# Patient Record
Sex: MALE | Race: WHITE | Employment: OTHER | URBAN - METROPOLITAN AREA
[De-identification: names, ages, dates, MRNs, and addresses within clinical notes are randomized per-mention and may not be internally consistent; named-entity substitution may affect disease eponyms.]

---

## 2019-12-06 ENCOUNTER — APPOINTMENT (OUTPATIENT)
Dept: CT IMAGING | Age: 69
DRG: 659 | End: 2019-12-06
Payer: MEDICARE

## 2019-12-06 ENCOUNTER — ANESTHESIA (OUTPATIENT)
Dept: OPERATING ROOM | Age: 69
DRG: 659 | End: 2019-12-06
Payer: MEDICARE

## 2019-12-06 ENCOUNTER — APPOINTMENT (OUTPATIENT)
Dept: GENERAL RADIOLOGY | Age: 69
DRG: 659 | End: 2019-12-06
Payer: MEDICARE

## 2019-12-06 ENCOUNTER — ANESTHESIA EVENT (OUTPATIENT)
Dept: OPERATING ROOM | Age: 69
DRG: 659 | End: 2019-12-06
Payer: MEDICARE

## 2019-12-06 ENCOUNTER — HOSPITAL ENCOUNTER (INPATIENT)
Age: 69
LOS: 9 days | Discharge: HOME | DRG: 659 | End: 2019-12-15
Attending: EMERGENCY MEDICINE | Admitting: FAMILY MEDICINE
Payer: MEDICARE

## 2019-12-06 VITALS
OXYGEN SATURATION: 100 % | TEMPERATURE: 96.1 F | RESPIRATION RATE: 9 BRPM | DIASTOLIC BLOOD PRESSURE: 54 MMHG | SYSTOLIC BLOOD PRESSURE: 85 MMHG

## 2019-12-06 DIAGNOSIS — N17.9 AKI (ACUTE KIDNEY INJURY) (HCC): Primary | ICD-10-CM

## 2019-12-06 DIAGNOSIS — N20.0 KIDNEY STONE: ICD-10-CM

## 2019-12-06 LAB
-: NORMAL
ABSOLUTE EOS #: <0.03 K/UL (ref 0–0.44)
ABSOLUTE IMMATURE GRANULOCYTE: 0.05 K/UL (ref 0–0.3)
ABSOLUTE LYMPH #: 1.07 K/UL (ref 1.1–3.7)
ABSOLUTE MONO #: 0.52 K/UL (ref 0.1–1.2)
ALBUMIN SERPL-MCNC: 4.4 G/DL (ref 3.5–5.2)
ALBUMIN/GLOBULIN RATIO: NORMAL (ref 1–2.5)
ALP BLD-CCNC: 47 U/L (ref 40–129)
ALT SERPL-CCNC: 9 U/L (ref 5–41)
AMORPHOUS: NORMAL
ANION GAP SERPL CALCULATED.3IONS-SCNC: 16 MMOL/L (ref 9–17)
AST SERPL-CCNC: 16 U/L
BACTERIA: NORMAL
BASOPHILS # BLD: 0 % (ref 0–2)
BASOPHILS ABSOLUTE: 0.05 K/UL (ref 0–0.2)
BILIRUB SERPL-MCNC: 0.31 MG/DL (ref 0.3–1.2)
BILIRUBIN DIRECT: <0.08 MG/DL
BILIRUBIN URINE: NEGATIVE
BILIRUBIN, INDIRECT: NORMAL MG/DL (ref 0–1)
BUN BLDV-MCNC: 39 MG/DL (ref 8–23)
BUN/CREAT BLD: 18 (ref 9–20)
CALCIUM SERPL-MCNC: 9.3 MG/DL (ref 8.6–10.4)
CASTS UA: NORMAL /LPF
CHLORIDE BLD-SCNC: 104 MMOL/L (ref 98–107)
CO2: 19 MMOL/L (ref 20–31)
COLOR: ABNORMAL
COMMENT UA: ABNORMAL
CREAT SERPL-MCNC: 2.16 MG/DL (ref 0.7–1.2)
CRYSTALS, UA: NORMAL /HPF
DIFFERENTIAL TYPE: ABNORMAL
EOSINOPHILS RELATIVE PERCENT: 0 % (ref 1–4)
EPITHELIAL CELLS UA: NORMAL /HPF (ref 0–5)
GFR AFRICAN AMERICAN: 37 ML/MIN
GFR NON-AFRICAN AMERICAN: 31 ML/MIN
GFR SERPL CREATININE-BSD FRML MDRD: ABNORMAL ML/MIN/{1.73_M2}
GFR SERPL CREATININE-BSD FRML MDRD: ABNORMAL ML/MIN/{1.73_M2}
GLOBULIN: NORMAL G/DL (ref 1.5–3.8)
GLUCOSE BLD-MCNC: 152 MG/DL (ref 70–99)
GLUCOSE URINE: NEGATIVE
HCT VFR BLD CALC: 34.5 % (ref 40.7–50.3)
HEMOGLOBIN: 10.9 G/DL (ref 13–17)
IMMATURE GRANULOCYTES: 0 %
KETONES, URINE: NEGATIVE
LEUKOCYTE ESTERASE, URINE: NEGATIVE
LYMPHOCYTES # BLD: 9 % (ref 24–43)
MCH RBC QN AUTO: 28.8 PG (ref 25.2–33.5)
MCHC RBC AUTO-ENTMCNC: 31.6 G/DL (ref 28.4–34.8)
MCV RBC AUTO: 91 FL (ref 82.6–102.9)
MONOCYTES # BLD: 5 % (ref 3–12)
MUCUS: NORMAL
NITRITE, URINE: NEGATIVE
NRBC AUTOMATED: 0 PER 100 WBC
OTHER OBSERVATIONS UA: NORMAL
PDW BLD-RTO: 13.7 % (ref 11.8–14.4)
PH UA: 5.5 (ref 5–8)
PLATELET # BLD: 215 K/UL (ref 138–453)
PLATELET ESTIMATE: ABNORMAL
PMV BLD AUTO: 10.9 FL (ref 8.1–13.5)
POTASSIUM SERPL-SCNC: 4.9 MMOL/L (ref 3.7–5.3)
PROTEIN UA: ABNORMAL
RBC # BLD: 3.79 M/UL (ref 4.21–5.77)
RBC # BLD: ABNORMAL 10*6/UL
RBC UA: NORMAL /HPF (ref 0–2)
RENAL EPITHELIAL, UA: NORMAL /HPF
SEG NEUTROPHILS: 86 % (ref 36–65)
SEGMENTED NEUTROPHILS ABSOLUTE COUNT: 9.73 K/UL (ref 1.5–8.1)
SODIUM BLD-SCNC: 139 MMOL/L (ref 135–144)
SPECIFIC GRAVITY UA: 1.01 (ref 1–1.03)
TOTAL PROTEIN: 7.2 G/DL (ref 6.4–8.3)
TRICHOMONAS: NORMAL
TROPONIN INTERP: ABNORMAL
TROPONIN T: ABNORMAL NG/ML
TROPONIN, HIGH SENSITIVITY: 54 NG/L (ref 0–22)
TURBIDITY: CLEAR
URINE HGB: ABNORMAL
UROBILINOGEN, URINE: NORMAL
WBC # BLD: 11.4 K/UL (ref 3.5–11.3)
WBC # BLD: ABNORMAL 10*3/UL
WBC UA: NORMAL /HPF (ref 0–5)
YEAST: NORMAL

## 2019-12-06 PROCEDURE — 2580000003 HC RX 258: Performed by: NURSE PRACTITIONER

## 2019-12-06 PROCEDURE — 2580000003 HC RX 258: Performed by: NURSE ANESTHETIST, CERTIFIED REGISTERED

## 2019-12-06 PROCEDURE — 82365 CALCULUS SPECTROSCOPY: CPT

## 2019-12-06 PROCEDURE — 80048 BASIC METABOLIC PNL TOTAL CA: CPT

## 2019-12-06 PROCEDURE — 0TC68ZZ EXTIRPATION OF MATTER FROM RIGHT URETER, VIA NATURAL OR ARTIFICIAL OPENING ENDOSCOPIC: ICD-10-PCS | Performed by: UROLOGY

## 2019-12-06 PROCEDURE — 81001 URINALYSIS AUTO W/SCOPE: CPT

## 2019-12-06 PROCEDURE — 36415 COLL VENOUS BLD VENIPUNCTURE: CPT

## 2019-12-06 PROCEDURE — 2580000003 HC RX 258: Performed by: FAMILY MEDICINE

## 2019-12-06 PROCEDURE — 2709999900 HC NON-CHARGEABLE SUPPLY: Performed by: UROLOGY

## 2019-12-06 PROCEDURE — 6360000004 HC RX CONTRAST MEDICATION: Performed by: UROLOGY

## 2019-12-06 PROCEDURE — 0T768DZ DILATION OF RIGHT URETER WITH INTRALUMINAL DEVICE, VIA NATURAL OR ARTIFICIAL OPENING ENDOSCOPIC: ICD-10-PCS | Performed by: UROLOGY

## 2019-12-06 PROCEDURE — 3700000000 HC ANESTHESIA ATTENDED CARE: Performed by: UROLOGY

## 2019-12-06 PROCEDURE — 99285 EMERGENCY DEPT VISIT HI MDM: CPT

## 2019-12-06 PROCEDURE — C2617 STENT, NON-COR, TEM W/O DEL: HCPCS | Performed by: UROLOGY

## 2019-12-06 PROCEDURE — 6370000000 HC RX 637 (ALT 250 FOR IP): Performed by: EMERGENCY MEDICINE

## 2019-12-06 PROCEDURE — 2500000003 HC RX 250 WO HCPCS: Performed by: NURSE ANESTHETIST, CERTIFIED REGISTERED

## 2019-12-06 PROCEDURE — 7100000001 HC PACU RECOVERY - ADDTL 15 MIN: Performed by: UROLOGY

## 2019-12-06 PROCEDURE — 2500000003 HC RX 250 WO HCPCS: Performed by: NURSE PRACTITIONER

## 2019-12-06 PROCEDURE — 96374 THER/PROPH/DIAG INJ IV PUSH: CPT

## 2019-12-06 PROCEDURE — 85025 COMPLETE CBC W/AUTO DIFF WBC: CPT

## 2019-12-06 PROCEDURE — 3700000001 HC ADD 15 MINUTES (ANESTHESIA): Performed by: UROLOGY

## 2019-12-06 PROCEDURE — 74176 CT ABD & PELVIS W/O CONTRAST: CPT

## 2019-12-06 PROCEDURE — 1200000000 HC SEMI PRIVATE

## 2019-12-06 PROCEDURE — 6360000002 HC RX W HCPCS: Performed by: NURSE ANESTHETIST, CERTIFIED REGISTERED

## 2019-12-06 PROCEDURE — 6360000002 HC RX W HCPCS: Performed by: ANESTHESIOLOGY

## 2019-12-06 PROCEDURE — 6360000002 HC RX W HCPCS: Performed by: NURSE PRACTITIONER

## 2019-12-06 PROCEDURE — 80076 HEPATIC FUNCTION PANEL: CPT

## 2019-12-06 PROCEDURE — 7100000000 HC PACU RECOVERY - FIRST 15 MIN: Performed by: UROLOGY

## 2019-12-06 PROCEDURE — 84484 ASSAY OF TROPONIN QUANT: CPT

## 2019-12-06 PROCEDURE — A9579 GAD-BASE MR CONTRAST NOS,1ML: HCPCS | Performed by: UROLOGY

## 2019-12-06 PROCEDURE — C1769 GUIDE WIRE: HCPCS | Performed by: UROLOGY

## 2019-12-06 PROCEDURE — 6360000002 HC RX W HCPCS: Performed by: FAMILY MEDICINE

## 2019-12-06 PROCEDURE — 3600000002 HC SURGERY LEVEL 2 BASE: Performed by: UROLOGY

## 2019-12-06 PROCEDURE — BT141ZZ FLUOROSCOPY OF KIDNEYS, URETERS AND BLADDER USING LOW OSMOLAR CONTRAST: ICD-10-PCS | Performed by: UROLOGY

## 2019-12-06 PROCEDURE — 6360000002 HC RX W HCPCS: Performed by: EMERGENCY MEDICINE

## 2019-12-06 PROCEDURE — 6370000000 HC RX 637 (ALT 250 FOR IP): Performed by: FAMILY MEDICINE

## 2019-12-06 PROCEDURE — 6370000000 HC RX 637 (ALT 250 FOR IP): Performed by: UROLOGY

## 2019-12-06 PROCEDURE — 93005 ELECTROCARDIOGRAM TRACING: CPT | Performed by: FAMILY MEDICINE

## 2019-12-06 PROCEDURE — 3209999900 FLUORO FOR SURGICAL PROCEDURES

## 2019-12-06 PROCEDURE — 2500000003 HC RX 250 WO HCPCS: Performed by: ANESTHESIOLOGY

## 2019-12-06 PROCEDURE — 74420 UROGRAPHY RTRGR +-KUB: CPT

## 2019-12-06 PROCEDURE — 96375 TX/PRO/DX INJ NEW DRUG ADDON: CPT

## 2019-12-06 PROCEDURE — 3600000012 HC SURGERY LEVEL 2 ADDTL 15MIN: Performed by: UROLOGY

## 2019-12-06 PROCEDURE — 6360000002 HC RX W HCPCS: Performed by: UROLOGY

## 2019-12-06 DEVICE — URETERAL STENT
Type: IMPLANTABLE DEVICE | Site: URETER | Status: FUNCTIONAL
Brand: POLARIS™ ULTRA

## 2019-12-06 RX ORDER — SODIUM CHLORIDE 0.9 % (FLUSH) 0.9 %
10 SYRINGE (ML) INJECTION EVERY 12 HOURS SCHEDULED
Status: DISCONTINUED | OUTPATIENT
Start: 2019-12-06 | End: 2019-12-06 | Stop reason: SDUPTHER

## 2019-12-06 RX ORDER — HYDROMORPHONE HYDROCHLORIDE 1 MG/ML
0.5 INJECTION, SOLUTION INTRAMUSCULAR; INTRAVENOUS; SUBCUTANEOUS EVERY 5 MIN PRN
Status: DISCONTINUED | OUTPATIENT
Start: 2019-12-06 | End: 2019-12-06 | Stop reason: HOSPADM

## 2019-12-06 RX ORDER — ACETAMINOPHEN 325 MG/1
650 TABLET ORAL EVERY 4 HOURS PRN
Status: DISCONTINUED | OUTPATIENT
Start: 2019-12-06 | End: 2019-12-06 | Stop reason: SDUPTHER

## 2019-12-06 RX ORDER — PROPOFOL 10 MG/ML
INJECTION, EMULSION INTRAVENOUS PRN
Status: DISCONTINUED | OUTPATIENT
Start: 2019-12-06 | End: 2019-12-06 | Stop reason: SDUPTHER

## 2019-12-06 RX ORDER — ONDANSETRON 2 MG/ML
4 INJECTION INTRAMUSCULAR; INTRAVENOUS ONCE
Status: COMPLETED | OUTPATIENT
Start: 2019-12-06 | End: 2019-12-06

## 2019-12-06 RX ORDER — HYDROMORPHONE HYDROCHLORIDE 1 MG/ML
0.5 INJECTION, SOLUTION INTRAMUSCULAR; INTRAVENOUS; SUBCUTANEOUS ONCE
Status: COMPLETED | OUTPATIENT
Start: 2019-12-06 | End: 2019-12-06

## 2019-12-06 RX ORDER — NICOTINE 21 MG/24HR
1 PATCH, TRANSDERMAL 24 HOURS TRANSDERMAL DAILY PRN
Status: DISCONTINUED | OUTPATIENT
Start: 2019-12-06 | End: 2019-12-15 | Stop reason: HOSPADM

## 2019-12-06 RX ORDER — TAMSULOSIN HYDROCHLORIDE 0.4 MG/1
0.4 CAPSULE ORAL ONCE
Status: COMPLETED | OUTPATIENT
Start: 2019-12-06 | End: 2019-12-06

## 2019-12-06 RX ORDER — MORPHINE SULFATE 4 MG/ML
4 INJECTION, SOLUTION INTRAMUSCULAR; INTRAVENOUS ONCE
Status: COMPLETED | OUTPATIENT
Start: 2019-12-06 | End: 2019-12-06

## 2019-12-06 RX ORDER — SODIUM CHLORIDE 9 MG/ML
INJECTION, SOLUTION INTRAVENOUS CONTINUOUS
Status: DISCONTINUED | OUTPATIENT
Start: 2019-12-06 | End: 2019-12-06

## 2019-12-06 RX ORDER — ONDANSETRON 2 MG/ML
INJECTION INTRAMUSCULAR; INTRAVENOUS PRN
Status: DISCONTINUED | OUTPATIENT
Start: 2019-12-06 | End: 2019-12-06 | Stop reason: SDUPTHER

## 2019-12-06 RX ORDER — SODIUM CHLORIDE 0.9 % (FLUSH) 0.9 %
10 SYRINGE (ML) INJECTION PRN
Status: DISCONTINUED | OUTPATIENT
Start: 2019-12-06 | End: 2019-12-15 | Stop reason: HOSPADM

## 2019-12-06 RX ORDER — GLYCOPYRROLATE 1 MG/5 ML
SYRINGE (ML) INTRAVENOUS PRN
Status: DISCONTINUED | OUTPATIENT
Start: 2019-12-06 | End: 2019-12-06 | Stop reason: SDUPTHER

## 2019-12-06 RX ORDER — ONDANSETRON 2 MG/ML
4 INJECTION INTRAMUSCULAR; INTRAVENOUS EVERY 6 HOURS PRN
Status: DISCONTINUED | OUTPATIENT
Start: 2019-12-06 | End: 2019-12-06

## 2019-12-06 RX ORDER — LIDOCAINE HYDROCHLORIDE 20 MG/ML
INJECTION, SOLUTION EPIDURAL; INFILTRATION; INTRACAUDAL; PERINEURAL PRN
Status: DISCONTINUED | OUTPATIENT
Start: 2019-12-06 | End: 2019-12-06 | Stop reason: SDUPTHER

## 2019-12-06 RX ORDER — MAGNESIUM SULFATE 1 G/100ML
1 INJECTION INTRAVENOUS PRN
Status: DISCONTINUED | OUTPATIENT
Start: 2019-12-06 | End: 2019-12-15 | Stop reason: HOSPADM

## 2019-12-06 RX ORDER — SODIUM CHLORIDE 9 MG/ML
INJECTION, SOLUTION INTRAVENOUS CONTINUOUS
Status: DISCONTINUED | OUTPATIENT
Start: 2019-12-06 | End: 2019-12-08

## 2019-12-06 RX ORDER — POTASSIUM CHLORIDE 7.45 MG/ML
10 INJECTION INTRAVENOUS PRN
Status: DISCONTINUED | OUTPATIENT
Start: 2019-12-06 | End: 2019-12-09

## 2019-12-06 RX ORDER — ONDANSETRON 2 MG/ML
4 INJECTION INTRAMUSCULAR; INTRAVENOUS EVERY 6 HOURS PRN
Status: DISCONTINUED | OUTPATIENT
Start: 2019-12-06 | End: 2019-12-15 | Stop reason: HOSPADM

## 2019-12-06 RX ORDER — FENTANYL CITRATE 50 UG/ML
INJECTION, SOLUTION INTRAMUSCULAR; INTRAVENOUS PRN
Status: DISCONTINUED | OUTPATIENT
Start: 2019-12-06 | End: 2019-12-06 | Stop reason: SDUPTHER

## 2019-12-06 RX ORDER — POTASSIUM CHLORIDE 20 MEQ/1
40 TABLET, EXTENDED RELEASE ORAL PRN
Status: DISCONTINUED | OUTPATIENT
Start: 2019-12-06 | End: 2019-12-09

## 2019-12-06 RX ORDER — PHENYLEPHRINE HCL IN 0.9% NACL 1 MG/10 ML
SYRINGE (ML) INTRAVENOUS PRN
Status: DISCONTINUED | OUTPATIENT
Start: 2019-12-06 | End: 2019-12-06 | Stop reason: SDUPTHER

## 2019-12-06 RX ORDER — CEFAZOLIN SODIUM 1 G/3ML
INJECTION, POWDER, FOR SOLUTION INTRAMUSCULAR; INTRAVENOUS PRN
Status: DISCONTINUED | OUTPATIENT
Start: 2019-12-06 | End: 2019-12-06 | Stop reason: SDUPTHER

## 2019-12-06 RX ORDER — METOPROLOL TARTRATE 5 MG/5ML
5 INJECTION INTRAVENOUS EVERY 6 HOURS
Status: DISCONTINUED | OUTPATIENT
Start: 2019-12-06 | End: 2019-12-07

## 2019-12-06 RX ORDER — MORPHINE SULFATE 15 MG/1
15 TABLET, FILM COATED, EXTENDED RELEASE ORAL 2 TIMES DAILY PRN
Refills: 0 | COMMUNITY
Start: 2019-11-07 | End: 2020-02-17

## 2019-12-06 RX ORDER — SODIUM CHLORIDE, SODIUM LACTATE, POTASSIUM CHLORIDE, CALCIUM CHLORIDE 600; 310; 30; 20 MG/100ML; MG/100ML; MG/100ML; MG/100ML
INJECTION, SOLUTION INTRAVENOUS CONTINUOUS PRN
Status: DISCONTINUED | OUTPATIENT
Start: 2019-12-06 | End: 2019-12-06 | Stop reason: SDUPTHER

## 2019-12-06 RX ORDER — LIDOCAINE HYDROCHLORIDE 20 MG/ML
JELLY TOPICAL PRN
Status: DISCONTINUED | OUTPATIENT
Start: 2019-12-06 | End: 2019-12-06 | Stop reason: ALTCHOICE

## 2019-12-06 RX ORDER — TOBRAMYCIN SULFATE 40 MG/ML
INJECTION, SOLUTION INTRAMUSCULAR; INTRAVENOUS PRN
Status: DISCONTINUED | OUTPATIENT
Start: 2019-12-06 | End: 2019-12-06 | Stop reason: ALTCHOICE

## 2019-12-06 RX ORDER — ACETAMINOPHEN 325 MG/1
650 TABLET ORAL EVERY 4 HOURS PRN
Status: DISCONTINUED | OUTPATIENT
Start: 2019-12-06 | End: 2019-12-15 | Stop reason: HOSPADM

## 2019-12-06 RX ORDER — MORPHINE SULFATE 15 MG/1
15 TABLET ORAL EVERY 4 HOURS PRN
COMMUNITY
End: 2019-12-06 | Stop reason: CLARIF

## 2019-12-06 RX ORDER — SODIUM CHLORIDE 0.9 % (FLUSH) 0.9 %
10 SYRINGE (ML) INJECTION EVERY 12 HOURS SCHEDULED
Status: DISCONTINUED | OUTPATIENT
Start: 2019-12-06 | End: 2019-12-15 | Stop reason: HOSPADM

## 2019-12-06 RX ORDER — MORPHINE SULFATE 15 MG/1
15 TABLET, FILM COATED, EXTENDED RELEASE ORAL 2 TIMES DAILY
Status: DISCONTINUED | OUTPATIENT
Start: 2019-12-06 | End: 2019-12-15 | Stop reason: HOSPADM

## 2019-12-06 RX ORDER — SODIUM CHLORIDE 0.9 % (FLUSH) 0.9 %
10 SYRINGE (ML) INJECTION PRN
Status: DISCONTINUED | OUTPATIENT
Start: 2019-12-06 | End: 2019-12-06 | Stop reason: SDUPTHER

## 2019-12-06 RX ORDER — ONDANSETRON 4 MG/1
4 TABLET, ORALLY DISINTEGRATING ORAL EVERY 6 HOURS PRN
Status: DISCONTINUED | OUTPATIENT
Start: 2019-12-06 | End: 2019-12-15 | Stop reason: HOSPADM

## 2019-12-06 RX ORDER — MORPHINE SULFATE 2 MG/ML
1 INJECTION, SOLUTION INTRAMUSCULAR; INTRAVENOUS EVERY 4 HOURS PRN
Status: DISCONTINUED | OUTPATIENT
Start: 2019-12-06 | End: 2019-12-08

## 2019-12-06 RX ORDER — FAMOTIDINE 20 MG/1
20 TABLET, FILM COATED ORAL 2 TIMES DAILY
Status: DISCONTINUED | OUTPATIENT
Start: 2019-12-06 | End: 2019-12-06

## 2019-12-06 RX ORDER — FENTANYL CITRATE 50 UG/ML
25 INJECTION, SOLUTION INTRAMUSCULAR; INTRAVENOUS EVERY 5 MIN PRN
Status: DISCONTINUED | OUTPATIENT
Start: 2019-12-06 | End: 2019-12-06 | Stop reason: HOSPADM

## 2019-12-06 RX ORDER — ONDANSETRON 2 MG/ML
4 INJECTION INTRAMUSCULAR; INTRAVENOUS
Status: DISCONTINUED | OUTPATIENT
Start: 2019-12-06 | End: 2019-12-06 | Stop reason: HOSPADM

## 2019-12-06 RX ORDER — FAMOTIDINE 20 MG/1
20 TABLET, FILM COATED ORAL NIGHTLY
Status: DISCONTINUED | OUTPATIENT
Start: 2019-12-06 | End: 2019-12-15 | Stop reason: HOSPADM

## 2019-12-06 RX ORDER — FENTANYL CITRATE 50 UG/ML
50 INJECTION, SOLUTION INTRAMUSCULAR; INTRAVENOUS EVERY 5 MIN PRN
Status: DISCONTINUED | OUTPATIENT
Start: 2019-12-06 | End: 2019-12-06 | Stop reason: HOSPADM

## 2019-12-06 RX ORDER — HYDROMORPHONE HYDROCHLORIDE 1 MG/ML
0.25 INJECTION, SOLUTION INTRAMUSCULAR; INTRAVENOUS; SUBCUTANEOUS EVERY 5 MIN PRN
Status: DISCONTINUED | OUTPATIENT
Start: 2019-12-06 | End: 2019-12-06 | Stop reason: HOSPADM

## 2019-12-06 RX ADMIN — HYDROMORPHONE HYDROCHLORIDE 0.5 MG: 1 INJECTION, SOLUTION INTRAMUSCULAR; INTRAVENOUS; SUBCUTANEOUS at 14:35

## 2019-12-06 RX ADMIN — CEFAZOLIN 2000 MG: 1 INJECTION, POWDER, FOR SOLUTION INTRAMUSCULAR; INTRAVENOUS at 18:42

## 2019-12-06 RX ADMIN — PROPOFOL 200 MG: 10 INJECTION, EMULSION INTRAVENOUS at 18:23

## 2019-12-06 RX ADMIN — FENTANYL CITRATE 50 MCG: 50 INJECTION, SOLUTION INTRAMUSCULAR; INTRAVENOUS at 19:49

## 2019-12-06 RX ADMIN — ONDANSETRON 4 MG: 2 INJECTION, SOLUTION INTRAMUSCULAR; INTRAVENOUS at 18:45

## 2019-12-06 RX ADMIN — SODIUM CHLORIDE: 9 INJECTION, SOLUTION INTRAVENOUS at 14:05

## 2019-12-06 RX ADMIN — MORPHINE SULFATE 15 MG: 15 TABLET, FILM COATED, EXTENDED RELEASE ORAL at 22:42

## 2019-12-06 RX ADMIN — Medication 10 ML: at 22:43

## 2019-12-06 RX ADMIN — LIDOCAINE HYDROCHLORIDE 60 MG: 20 INJECTION, SOLUTION EPIDURAL; INFILTRATION; INTRACAUDAL; PERINEURAL at 18:23

## 2019-12-06 RX ADMIN — Medication 100 MCG: at 18:23

## 2019-12-06 RX ADMIN — Medication 200 MCG: at 18:41

## 2019-12-06 RX ADMIN — FAMOTIDINE 20 MG: 20 TABLET ORAL at 22:42

## 2019-12-06 RX ADMIN — FENTANYL CITRATE 50 MCG: 50 INJECTION, SOLUTION INTRAMUSCULAR; INTRAVENOUS at 19:54

## 2019-12-06 RX ADMIN — METOPROLOL TARTRATE 5 MG: 5 INJECTION INTRAVENOUS at 22:42

## 2019-12-06 RX ADMIN — TAMSULOSIN HYDROCHLORIDE 0.4 MG: 0.4 CAPSULE ORAL at 15:24

## 2019-12-06 RX ADMIN — ONDANSETRON 4 MG: 2 INJECTION INTRAMUSCULAR; INTRAVENOUS at 14:06

## 2019-12-06 RX ADMIN — HYDROMORPHONE HYDROCHLORIDE 0.5 MG: 1 INJECTION, SOLUTION INTRAMUSCULAR; INTRAVENOUS; SUBCUTANEOUS at 14:06

## 2019-12-06 RX ADMIN — Medication 0.2 MG: at 18:41

## 2019-12-06 RX ADMIN — MORPHINE SULFATE 1 MG: 2 INJECTION, SOLUTION INTRAMUSCULAR; INTRAVENOUS at 20:52

## 2019-12-06 RX ADMIN — MORPHINE SULFATE 4 MG: 4 INJECTION INTRAVENOUS at 15:32

## 2019-12-06 RX ADMIN — SODIUM CHLORIDE, POTASSIUM CHLORIDE, SODIUM LACTATE AND CALCIUM CHLORIDE: 600; 310; 30; 20 INJECTION, SOLUTION INTRAVENOUS at 18:18

## 2019-12-06 RX ADMIN — Medication 100 MCG: at 18:46

## 2019-12-06 ASSESSMENT — PAIN DESCRIPTION - ONSET: ONSET: PROGRESSIVE

## 2019-12-06 ASSESSMENT — PAIN - FUNCTIONAL ASSESSMENT: PAIN_FUNCTIONAL_ASSESSMENT: ACTIVITIES ARE NOT PREVENTED

## 2019-12-06 ASSESSMENT — PULMONARY FUNCTION TESTS
PIF_VALUE: 0
PIF_VALUE: 14
PIF_VALUE: 0
PIF_VALUE: 3
PIF_VALUE: 17
PIF_VALUE: 13
PIF_VALUE: 6
PIF_VALUE: 16
PIF_VALUE: 13
PIF_VALUE: 14
PIF_VALUE: 15
PIF_VALUE: 1
PIF_VALUE: 14
PIF_VALUE: 13
PIF_VALUE: 6
PIF_VALUE: 12
PIF_VALUE: 14
PIF_VALUE: 15
PIF_VALUE: 1
PIF_VALUE: 0
PIF_VALUE: 13
PIF_VALUE: 14
PIF_VALUE: 15
PIF_VALUE: 14
PIF_VALUE: 1
PIF_VALUE: 12
PIF_VALUE: 14
PIF_VALUE: 12
PIF_VALUE: 14
PIF_VALUE: 13
PIF_VALUE: 12
PIF_VALUE: 15
PIF_VALUE: 13
PIF_VALUE: 14
PIF_VALUE: 6
PIF_VALUE: 14

## 2019-12-06 ASSESSMENT — ENCOUNTER SYMPTOMS
DIARRHEA: 0
SHORTNESS OF BREATH: 0
VOMITING: 1
COUGH: 0
NAUSEA: 1
BACK PAIN: 1
COLOR CHANGE: 0
ABDOMINAL PAIN: 1

## 2019-12-06 ASSESSMENT — PAIN DESCRIPTION - ORIENTATION
ORIENTATION: MID
ORIENTATION: RIGHT

## 2019-12-06 ASSESSMENT — PAIN DESCRIPTION - PAIN TYPE
TYPE: ACUTE PAIN
TYPE: ACUTE PAIN

## 2019-12-06 ASSESSMENT — PAIN DESCRIPTION - DESCRIPTORS
DESCRIPTORS: SHARP
DESCRIPTORS: SHARP;SHOOTING;STABBING
DESCRIPTORS: PRESSURE;SHARP
DESCRIPTORS: SHARP

## 2019-12-06 ASSESSMENT — PAIN DESCRIPTION - LOCATION
LOCATION: STERNUM
LOCATION: PENIS
LOCATION: FLANK

## 2019-12-06 ASSESSMENT — PAIN DESCRIPTION - FREQUENCY
FREQUENCY: INTERMITTENT
FREQUENCY: INTERMITTENT

## 2019-12-06 ASSESSMENT — PAIN SCALES - GENERAL
PAINLEVEL_OUTOF10: 3
PAINLEVEL_OUTOF10: 8
PAINLEVEL_OUTOF10: 10
PAINLEVEL_OUTOF10: 0
PAINLEVEL_OUTOF10: 10
PAINLEVEL_OUTOF10: 10
PAINLEVEL_OUTOF10: 8
PAINLEVEL_OUTOF10: 9
PAINLEVEL_OUTOF10: 7
PAINLEVEL_OUTOF10: 9
PAINLEVEL_OUTOF10: 0

## 2019-12-06 ASSESSMENT — PAIN DESCRIPTION - PROGRESSION: CLINICAL_PROGRESSION: NOT CHANGED

## 2019-12-07 LAB
ABSOLUTE EOS #: <0.03 K/UL (ref 0–0.44)
ABSOLUTE IMMATURE GRANULOCYTE: 0.02 K/UL (ref 0–0.3)
ABSOLUTE LYMPH #: 1.63 K/UL (ref 1.1–3.7)
ABSOLUTE MONO #: 0.81 K/UL (ref 0.1–1.2)
ANION GAP SERPL CALCULATED.3IONS-SCNC: 12 MMOL/L (ref 9–17)
ANION GAP SERPL CALCULATED.3IONS-SCNC: 12 MMOL/L (ref 9–17)
BASOPHILS # BLD: 1 % (ref 0–2)
BASOPHILS ABSOLUTE: 0.04 K/UL (ref 0–0.2)
BUN BLDV-MCNC: 34 MG/DL (ref 8–23)
BUN BLDV-MCNC: 39 MG/DL (ref 8–23)
BUN/CREAT BLD: 16 (ref 9–20)
BUN/CREAT BLD: 18 (ref 9–20)
CALCIUM SERPL-MCNC: 8.5 MG/DL (ref 8.6–10.4)
CALCIUM SERPL-MCNC: 8.9 MG/DL (ref 8.6–10.4)
CHLORIDE BLD-SCNC: 106 MMOL/L (ref 98–107)
CHLORIDE BLD-SCNC: 110 MMOL/L (ref 98–107)
CO2: 19 MMOL/L (ref 20–31)
CO2: 19 MMOL/L (ref 20–31)
CREAT SERPL-MCNC: 2.17 MG/DL (ref 0.7–1.2)
CREAT SERPL-MCNC: 2.22 MG/DL (ref 0.7–1.2)
DIFFERENTIAL TYPE: ABNORMAL
EKG ATRIAL RATE: 99 BPM
EKG P AXIS: 68 DEGREES
EKG P-R INTERVAL: 170 MS
EKG Q-T INTERVAL: 348 MS
EKG QRS DURATION: 84 MS
EKG QTC CALCULATION (BAZETT): 446 MS
EKG R AXIS: 52 DEGREES
EKG T AXIS: 101 DEGREES
EKG VENTRICULAR RATE: 99 BPM
EOSINOPHILS RELATIVE PERCENT: 0 % (ref 1–4)
GFR AFRICAN AMERICAN: 36 ML/MIN
GFR AFRICAN AMERICAN: 37 ML/MIN
GFR NON-AFRICAN AMERICAN: 30 ML/MIN
GFR NON-AFRICAN AMERICAN: 30 ML/MIN
GFR SERPL CREATININE-BSD FRML MDRD: ABNORMAL ML/MIN/{1.73_M2}
GLUCOSE BLD-MCNC: 129 MG/DL (ref 70–99)
GLUCOSE BLD-MCNC: 145 MG/DL (ref 70–99)
HCT VFR BLD CALC: 29.7 % (ref 40.7–50.3)
HCT VFR BLD CALC: 32.1 % (ref 40.7–50.3)
HEMOGLOBIN: 10.1 G/DL (ref 13–17)
HEMOGLOBIN: 9.3 G/DL (ref 13–17)
IMMATURE GRANULOCYTES: 0 %
INR BLD: 1
LYMPHOCYTES # BLD: 19 % (ref 24–43)
MCH RBC QN AUTO: 28.4 PG (ref 25.2–33.5)
MCH RBC QN AUTO: 28.9 PG (ref 25.2–33.5)
MCHC RBC AUTO-ENTMCNC: 31.3 G/DL (ref 28.4–34.8)
MCHC RBC AUTO-ENTMCNC: 31.5 G/DL (ref 28.4–34.8)
MCV RBC AUTO: 90.8 FL (ref 82.6–102.9)
MCV RBC AUTO: 91.7 FL (ref 82.6–102.9)
MONOCYTES # BLD: 9 % (ref 3–12)
NRBC AUTOMATED: 0 PER 100 WBC
NRBC AUTOMATED: 0 PER 100 WBC
PARTIAL THROMBOPLASTIN TIME: 25.5 SEC (ref 23–31)
PARTIAL THROMBOPLASTIN TIME: 33.8 SEC (ref 23–31)
PDW BLD-RTO: 14 % (ref 11.8–14.4)
PDW BLD-RTO: 14 % (ref 11.8–14.4)
PLATELET # BLD: 182 K/UL (ref 138–453)
PLATELET # BLD: 188 K/UL (ref 138–453)
PLATELET ESTIMATE: ABNORMAL
PMV BLD AUTO: 10.2 FL (ref 8.1–13.5)
PMV BLD AUTO: 10.3 FL (ref 8.1–13.5)
POTASSIUM SERPL-SCNC: 5.1 MMOL/L (ref 3.7–5.3)
POTASSIUM SERPL-SCNC: 5.4 MMOL/L (ref 3.7–5.3)
PROTHROMBIN TIME: 10.3 SEC (ref 9.7–11.6)
RBC # BLD: 3.27 M/UL (ref 4.21–5.77)
RBC # BLD: 3.5 M/UL (ref 4.21–5.77)
RBC # BLD: ABNORMAL 10*6/UL
SEG NEUTROPHILS: 71 % (ref 36–65)
SEGMENTED NEUTROPHILS ABSOLUTE COUNT: 6.23 K/UL (ref 1.5–8.1)
SODIUM BLD-SCNC: 137 MMOL/L (ref 135–144)
SODIUM BLD-SCNC: 141 MMOL/L (ref 135–144)
TROPONIN INTERP: ABNORMAL
TROPONIN T: ABNORMAL NG/ML
TROPONIN, HIGH SENSITIVITY: 100 NG/L (ref 0–22)
TROPONIN, HIGH SENSITIVITY: 125 NG/L (ref 0–22)
TROPONIN, HIGH SENSITIVITY: 88 NG/L (ref 0–22)
TROPONIN, HIGH SENSITIVITY: 97 NG/L (ref 0–22)
WBC # BLD: 8.8 K/UL (ref 3.5–11.3)
WBC # BLD: 9 K/UL (ref 3.5–11.3)
WBC # BLD: ABNORMAL 10*3/UL

## 2019-12-07 PROCEDURE — 85610 PROTHROMBIN TIME: CPT

## 2019-12-07 PROCEDURE — 97535 SELF CARE MNGMENT TRAINING: CPT

## 2019-12-07 PROCEDURE — 97530 THERAPEUTIC ACTIVITIES: CPT

## 2019-12-07 PROCEDURE — 6360000002 HC RX W HCPCS: Performed by: INTERNAL MEDICINE

## 2019-12-07 PROCEDURE — 6360000002 HC RX W HCPCS: Performed by: UROLOGY

## 2019-12-07 PROCEDURE — 97116 GAIT TRAINING THERAPY: CPT

## 2019-12-07 PROCEDURE — 6360000002 HC RX W HCPCS: Performed by: FAMILY MEDICINE

## 2019-12-07 PROCEDURE — 2500000003 HC RX 250 WO HCPCS: Performed by: NURSE PRACTITIONER

## 2019-12-07 PROCEDURE — 6370000000 HC RX 637 (ALT 250 FOR IP): Performed by: INTERNAL MEDICINE

## 2019-12-07 PROCEDURE — 6370000000 HC RX 637 (ALT 250 FOR IP): Performed by: FAMILY MEDICINE

## 2019-12-07 PROCEDURE — 85025 COMPLETE CBC W/AUTO DIFF WBC: CPT

## 2019-12-07 PROCEDURE — 2580000003 HC RX 258: Performed by: UROLOGY

## 2019-12-07 PROCEDURE — 6370000000 HC RX 637 (ALT 250 FOR IP): Performed by: NURSE PRACTITIONER

## 2019-12-07 PROCEDURE — 6370000000 HC RX 637 (ALT 250 FOR IP): Performed by: UROLOGY

## 2019-12-07 PROCEDURE — 80048 BASIC METABOLIC PNL TOTAL CA: CPT

## 2019-12-07 PROCEDURE — 85027 COMPLETE CBC AUTOMATED: CPT

## 2019-12-07 PROCEDURE — 93005 ELECTROCARDIOGRAM TRACING: CPT | Performed by: INTERNAL MEDICINE

## 2019-12-07 PROCEDURE — 2060000000 HC ICU INTERMEDIATE R&B

## 2019-12-07 PROCEDURE — 93306 TTE W/DOPPLER COMPLETE: CPT

## 2019-12-07 PROCEDURE — 51798 US URINE CAPACITY MEASURE: CPT

## 2019-12-07 PROCEDURE — 97162 PT EVAL MOD COMPLEX 30 MIN: CPT

## 2019-12-07 PROCEDURE — 84484 ASSAY OF TROPONIN QUANT: CPT

## 2019-12-07 PROCEDURE — 2580000003 HC RX 258: Performed by: FAMILY MEDICINE

## 2019-12-07 PROCEDURE — 6360000002 HC RX W HCPCS: Performed by: NURSE PRACTITIONER

## 2019-12-07 PROCEDURE — 85730 THROMBOPLASTIN TIME PARTIAL: CPT

## 2019-12-07 PROCEDURE — 97166 OT EVAL MOD COMPLEX 45 MIN: CPT

## 2019-12-07 PROCEDURE — 36415 COLL VENOUS BLD VENIPUNCTURE: CPT

## 2019-12-07 RX ORDER — OXYBUTYNIN CHLORIDE 5 MG/1
5 TABLET ORAL 3 TIMES DAILY
Status: DISCONTINUED | OUTPATIENT
Start: 2019-12-07 | End: 2019-12-15 | Stop reason: HOSPADM

## 2019-12-07 RX ORDER — ASPIRIN 81 MG/1
81 TABLET, CHEWABLE ORAL DAILY
Status: DISCONTINUED | OUTPATIENT
Start: 2019-12-08 | End: 2019-12-15 | Stop reason: HOSPADM

## 2019-12-07 RX ORDER — HEPARIN SODIUM 1000 [USP'U]/ML
4000 INJECTION, SOLUTION INTRAVENOUS; SUBCUTANEOUS ONCE
Status: COMPLETED | OUTPATIENT
Start: 2019-12-07 | End: 2019-12-07

## 2019-12-07 RX ORDER — ATORVASTATIN CALCIUM 20 MG/1
20 TABLET, FILM COATED ORAL NIGHTLY
Status: DISCONTINUED | OUTPATIENT
Start: 2019-12-07 | End: 2019-12-15 | Stop reason: HOSPADM

## 2019-12-07 RX ORDER — SODIUM POLYSTYRENE SULFONATE 15 G/60ML
15 SUSPENSION ORAL; RECTAL ONCE
Status: COMPLETED | OUTPATIENT
Start: 2019-12-07 | End: 2019-12-07

## 2019-12-07 RX ORDER — TAMSULOSIN HYDROCHLORIDE 0.4 MG/1
0.4 CAPSULE ORAL DAILY
Status: DISCONTINUED | OUTPATIENT
Start: 2019-12-07 | End: 2019-12-15 | Stop reason: HOSPADM

## 2019-12-07 RX ORDER — HYDRALAZINE HYDROCHLORIDE 20 MG/ML
5 INJECTION INTRAMUSCULAR; INTRAVENOUS EVERY 6 HOURS PRN
Status: DISCONTINUED | OUTPATIENT
Start: 2019-12-07 | End: 2019-12-15 | Stop reason: HOSPADM

## 2019-12-07 RX ORDER — BISACODYL 10 MG
10 SUPPOSITORY, RECTAL RECTAL ONCE
Status: COMPLETED | OUTPATIENT
Start: 2019-12-07 | End: 2019-12-07

## 2019-12-07 RX ORDER — LIDOCAINE HYDROCHLORIDE 20 MG/ML
JELLY TOPICAL PRN
Status: DISCONTINUED | OUTPATIENT
Start: 2019-12-07 | End: 2019-12-15 | Stop reason: HOSPADM

## 2019-12-07 RX ORDER — HEPARIN SODIUM 1000 [USP'U]/ML
4000 INJECTION, SOLUTION INTRAVENOUS; SUBCUTANEOUS PRN
Status: DISCONTINUED | OUTPATIENT
Start: 2019-12-07 | End: 2019-12-09

## 2019-12-07 RX ORDER — HEPARIN SODIUM 10000 [USP'U]/100ML
12 INJECTION, SOLUTION INTRAVENOUS CONTINUOUS
Status: DISCONTINUED | OUTPATIENT
Start: 2019-12-07 | End: 2019-12-09

## 2019-12-07 RX ORDER — HEPARIN SODIUM 1000 [USP'U]/ML
2000 INJECTION, SOLUTION INTRAVENOUS; SUBCUTANEOUS PRN
Status: DISCONTINUED | OUTPATIENT
Start: 2019-12-07 | End: 2019-12-09

## 2019-12-07 RX ADMIN — Medication 10 ML: at 09:12

## 2019-12-07 RX ADMIN — BISACODYL 10 MG: 10 SUPPOSITORY RECTAL at 12:16

## 2019-12-07 RX ADMIN — ONDANSETRON 4 MG: 2 INJECTION INTRAMUSCULAR; INTRAVENOUS at 19:44

## 2019-12-07 RX ADMIN — HEPARIN SODIUM 4000 UNITS: 1000 INJECTION INTRAVENOUS; SUBCUTANEOUS at 16:00

## 2019-12-07 RX ADMIN — CEFAZOLIN 500 MG: 1 INJECTION, POWDER, FOR SOLUTION INTRAMUSCULAR; INTRAVENOUS at 18:52

## 2019-12-07 RX ADMIN — HYDRALAZINE HYDROCHLORIDE 5 MG: 20 INJECTION INTRAMUSCULAR; INTRAVENOUS at 21:22

## 2019-12-07 RX ADMIN — OXYBUTYNIN CHLORIDE 5 MG: 5 TABLET ORAL at 13:24

## 2019-12-07 RX ADMIN — ACETAMINOPHEN 650 MG: 325 TABLET ORAL at 16:53

## 2019-12-07 RX ADMIN — ATORVASTATIN CALCIUM 20 MG: 20 TABLET, FILM COATED ORAL at 19:54

## 2019-12-07 RX ADMIN — OXYBUTYNIN CHLORIDE 5 MG: 5 TABLET ORAL at 19:54

## 2019-12-07 RX ADMIN — HEPARIN SODIUM AND DEXTROSE 14 UNITS/KG/HR: 10000; 5 INJECTION INTRAVENOUS at 22:06

## 2019-12-07 RX ADMIN — MORPHINE SULFATE 1 MG: 2 INJECTION, SOLUTION INTRAMUSCULAR; INTRAVENOUS at 19:43

## 2019-12-07 RX ADMIN — CEFAZOLIN 500 MG: 1 INJECTION, POWDER, FOR SOLUTION INTRAMUSCULAR; INTRAVENOUS at 11:32

## 2019-12-07 RX ADMIN — SODIUM CHLORIDE: 9 INJECTION, SOLUTION INTRAVENOUS at 03:32

## 2019-12-07 RX ADMIN — CEFAZOLIN 500 MG: 1 INJECTION, POWDER, FOR SOLUTION INTRAMUSCULAR; INTRAVENOUS at 03:32

## 2019-12-07 RX ADMIN — METOPROLOL TARTRATE 5 MG: 5 INJECTION INTRAVENOUS at 03:51

## 2019-12-07 RX ADMIN — FAMOTIDINE 20 MG: 20 TABLET ORAL at 19:54

## 2019-12-07 RX ADMIN — SODIUM POLYSTYRENE SULFONATE 15 G: 15 SUSPENSION ORAL; RECTAL at 09:11

## 2019-12-07 RX ADMIN — MORPHINE SULFATE 1 MG: 2 INJECTION, SOLUTION INTRAMUSCULAR; INTRAVENOUS at 05:16

## 2019-12-07 RX ADMIN — ASPIRIN 325 MG: 325 TABLET, DELAYED RELEASE ORAL at 16:00

## 2019-12-07 RX ADMIN — HEPARIN SODIUM 2000 UNITS: 1000 INJECTION INTRAVENOUS; SUBCUTANEOUS at 22:06

## 2019-12-07 RX ADMIN — Medication 10 ML: at 03:51

## 2019-12-07 RX ADMIN — TAMSULOSIN HYDROCHLORIDE 0.4 MG: 0.4 CAPSULE ORAL at 16:00

## 2019-12-07 RX ADMIN — MORPHINE SULFATE 15 MG: 15 TABLET, FILM COATED, EXTENDED RELEASE ORAL at 22:27

## 2019-12-07 RX ADMIN — HEPARIN SODIUM AND DEXTROSE 12 UNITS/KG/HR: 10000; 5 INJECTION INTRAVENOUS at 16:09

## 2019-12-07 RX ADMIN — LIDOCAINE HYDROCHLORIDE: 20 JELLY TOPICAL at 13:33

## 2019-12-07 RX ADMIN — Medication 10 ML: at 05:16

## 2019-12-07 RX ADMIN — METOPROLOL TARTRATE 5 MG: 5 INJECTION INTRAVENOUS at 11:32

## 2019-12-07 RX ADMIN — METOPROLOL TARTRATE 25 MG: 25 TABLET ORAL at 19:54

## 2019-12-07 ASSESSMENT — PAIN DESCRIPTION - PAIN TYPE
TYPE: SURGICAL PAIN;ACUTE PAIN
TYPE: ACUTE PAIN

## 2019-12-07 ASSESSMENT — PAIN DESCRIPTION - ONSET
ONSET: ON-GOING
ONSET: ON-GOING

## 2019-12-07 ASSESSMENT — PAIN SCALES - GENERAL
PAINLEVEL_OUTOF10: 7
PAINLEVEL_OUTOF10: 8
PAINLEVEL_OUTOF10: 2
PAINLEVEL_OUTOF10: 6
PAINLEVEL_OUTOF10: 8
PAINLEVEL_OUTOF10: 3
PAINLEVEL_OUTOF10: 8
PAINLEVEL_OUTOF10: 6
PAINLEVEL_OUTOF10: 8

## 2019-12-07 ASSESSMENT — PAIN DESCRIPTION - PROGRESSION: CLINICAL_PROGRESSION: GRADUALLY WORSENING

## 2019-12-07 ASSESSMENT — PAIN DESCRIPTION - FREQUENCY
FREQUENCY: INTERMITTENT
FREQUENCY: INTERMITTENT

## 2019-12-07 ASSESSMENT — PAIN DESCRIPTION - ORIENTATION
ORIENTATION: LOWER
ORIENTATION: RIGHT

## 2019-12-07 ASSESSMENT — PAIN - FUNCTIONAL ASSESSMENT
PAIN_FUNCTIONAL_ASSESSMENT: ACTIVITIES ARE NOT PREVENTED
PAIN_FUNCTIONAL_ASSESSMENT: PREVENTS OR INTERFERES SOME ACTIVE ACTIVITIES AND ADLS

## 2019-12-07 ASSESSMENT — PAIN DESCRIPTION - LOCATION
LOCATION: ABDOMEN;FLANK
LOCATION: ABDOMEN;FLANK
LOCATION: ABDOMEN
LOCATION: ABDOMEN;FLANK
LOCATION: ABDOMEN;FLANK

## 2019-12-07 ASSESSMENT — PAIN DESCRIPTION - DESCRIPTORS
DESCRIPTORS: ACHING;PRESSURE
DESCRIPTORS: PRESSURE;SHARP

## 2019-12-08 ENCOUNTER — APPOINTMENT (OUTPATIENT)
Dept: CT IMAGING | Age: 69
DRG: 659 | End: 2019-12-08
Payer: MEDICARE

## 2019-12-08 LAB
ABSOLUTE EOS #: <0.03 K/UL (ref 0–0.44)
ABSOLUTE IMMATURE GRANULOCYTE: 0.03 K/UL (ref 0–0.3)
ABSOLUTE LYMPH #: 1.47 K/UL (ref 1.1–3.7)
ABSOLUTE MONO #: 0.71 K/UL (ref 0.1–1.2)
ANION GAP SERPL CALCULATED.3IONS-SCNC: 13 MMOL/L (ref 9–17)
BASOPHILS # BLD: 0 % (ref 0–2)
BASOPHILS ABSOLUTE: 0.03 K/UL (ref 0–0.2)
BUN BLDV-MCNC: 33 MG/DL (ref 8–23)
BUN/CREAT BLD: 15 (ref 9–20)
CALCIUM SERPL-MCNC: 8.6 MG/DL (ref 8.6–10.4)
CHLORIDE BLD-SCNC: 109 MMOL/L (ref 98–107)
CO2: 18 MMOL/L (ref 20–31)
CREAT SERPL-MCNC: 2.19 MG/DL (ref 0.7–1.2)
DIFFERENTIAL TYPE: ABNORMAL
EOSINOPHILS RELATIVE PERCENT: 0 % (ref 1–4)
GFR AFRICAN AMERICAN: 36 ML/MIN
GFR NON-AFRICAN AMERICAN: 30 ML/MIN
GFR SERPL CREATININE-BSD FRML MDRD: ABNORMAL ML/MIN/{1.73_M2}
GFR SERPL CREATININE-BSD FRML MDRD: ABNORMAL ML/MIN/{1.73_M2}
GLUCOSE BLD-MCNC: 152 MG/DL (ref 70–99)
HCT VFR BLD CALC: 30.9 % (ref 40.7–50.3)
HEMOGLOBIN: 9.7 G/DL (ref 13–17)
IMMATURE GRANULOCYTES: 0 %
LYMPHOCYTES # BLD: 15 % (ref 24–43)
MCH RBC QN AUTO: 28.6 PG (ref 25.2–33.5)
MCHC RBC AUTO-ENTMCNC: 31.4 G/DL (ref 28.4–34.8)
MCV RBC AUTO: 91.2 FL (ref 82.6–102.9)
MONOCYTES # BLD: 7 % (ref 3–12)
NRBC AUTOMATED: 0 PER 100 WBC
PARTIAL THROMBOPLASTIN TIME: 25.9 SEC (ref 23–31)
PARTIAL THROMBOPLASTIN TIME: 67.1 SEC (ref 23–31)
PARTIAL THROMBOPLASTIN TIME: 67.6 SEC (ref 23–31)
PARTIAL THROMBOPLASTIN TIME: 74.8 SEC (ref 23–31)
PARTIAL THROMBOPLASTIN TIME: 86.5 SEC (ref 23–31)
PDW BLD-RTO: 13.9 % (ref 11.8–14.4)
PLATELET # BLD: 188 K/UL (ref 138–453)
PLATELET ESTIMATE: ABNORMAL
PMV BLD AUTO: 11.1 FL (ref 8.1–13.5)
POTASSIUM SERPL-SCNC: 4.6 MMOL/L (ref 3.7–5.3)
RBC # BLD: 3.39 M/UL (ref 4.21–5.77)
RBC # BLD: ABNORMAL 10*6/UL
SEG NEUTROPHILS: 78 % (ref 36–65)
SEGMENTED NEUTROPHILS ABSOLUTE COUNT: 7.48 K/UL (ref 1.5–8.1)
SODIUM BLD-SCNC: 140 MMOL/L (ref 135–144)
TROPONIN INTERP: ABNORMAL
TROPONIN INTERP: ABNORMAL
TROPONIN T: ABNORMAL NG/ML
TROPONIN T: ABNORMAL NG/ML
TROPONIN, HIGH SENSITIVITY: 118 NG/L (ref 0–22)
TROPONIN, HIGH SENSITIVITY: 144 NG/L (ref 0–22)
WBC # BLD: 9.7 K/UL (ref 3.5–11.3)
WBC # BLD: ABNORMAL 10*3/UL

## 2019-12-08 PROCEDURE — 6360000002 HC RX W HCPCS: Performed by: FAMILY MEDICINE

## 2019-12-08 PROCEDURE — 6370000000 HC RX 637 (ALT 250 FOR IP): Performed by: NURSE PRACTITIONER

## 2019-12-08 PROCEDURE — 6360000002 HC RX W HCPCS: Performed by: INTERNAL MEDICINE

## 2019-12-08 PROCEDURE — 6370000000 HC RX 637 (ALT 250 FOR IP): Performed by: FAMILY MEDICINE

## 2019-12-08 PROCEDURE — 6370000000 HC RX 637 (ALT 250 FOR IP): Performed by: UROLOGY

## 2019-12-08 PROCEDURE — 74176 CT ABD & PELVIS W/O CONTRAST: CPT

## 2019-12-08 PROCEDURE — 2060000000 HC ICU INTERMEDIATE R&B

## 2019-12-08 PROCEDURE — 85730 THROMBOPLASTIN TIME PARTIAL: CPT

## 2019-12-08 PROCEDURE — 6360000002 HC RX W HCPCS: Performed by: UROLOGY

## 2019-12-08 PROCEDURE — 85025 COMPLETE CBC W/AUTO DIFF WBC: CPT

## 2019-12-08 PROCEDURE — 84484 ASSAY OF TROPONIN QUANT: CPT

## 2019-12-08 PROCEDURE — 2580000003 HC RX 258: Performed by: UROLOGY

## 2019-12-08 PROCEDURE — 6370000000 HC RX 637 (ALT 250 FOR IP): Performed by: INTERNAL MEDICINE

## 2019-12-08 PROCEDURE — 80048 BASIC METABOLIC PNL TOTAL CA: CPT

## 2019-12-08 PROCEDURE — 36415 COLL VENOUS BLD VENIPUNCTURE: CPT

## 2019-12-08 RX ORDER — HYDROMORPHONE HYDROCHLORIDE 1 MG/ML
1 INJECTION, SOLUTION INTRAMUSCULAR; INTRAVENOUS; SUBCUTANEOUS EVERY 4 HOURS PRN
Status: DISCONTINUED | OUTPATIENT
Start: 2019-12-08 | End: 2019-12-15 | Stop reason: HOSPADM

## 2019-12-08 RX ORDER — SODIUM BICARBONATE 650 MG/1
650 TABLET ORAL 2 TIMES DAILY
Status: DISCONTINUED | OUTPATIENT
Start: 2019-12-08 | End: 2019-12-15 | Stop reason: HOSPADM

## 2019-12-08 RX ORDER — AMLODIPINE BESYLATE 10 MG/1
10 TABLET ORAL DAILY
Status: DISCONTINUED | OUTPATIENT
Start: 2019-12-08 | End: 2019-12-15 | Stop reason: HOSPADM

## 2019-12-08 RX ORDER — METOPROLOL TARTRATE 50 MG/1
50 TABLET, FILM COATED ORAL 2 TIMES DAILY
Status: DISCONTINUED | OUTPATIENT
Start: 2019-12-08 | End: 2019-12-15 | Stop reason: HOSPADM

## 2019-12-08 RX ADMIN — OXYBUTYNIN CHLORIDE 5 MG: 5 TABLET ORAL at 21:42

## 2019-12-08 RX ADMIN — HEPARIN SODIUM 4000 UNITS: 1000 INJECTION INTRAVENOUS; SUBCUTANEOUS at 05:46

## 2019-12-08 RX ADMIN — FAMOTIDINE 20 MG: 20 TABLET ORAL at 21:41

## 2019-12-08 RX ADMIN — ONDANSETRON 4 MG: 2 INJECTION INTRAMUSCULAR; INTRAVENOUS at 07:19

## 2019-12-08 RX ADMIN — ATORVASTATIN CALCIUM 20 MG: 20 TABLET, FILM COATED ORAL at 21:42

## 2019-12-08 RX ADMIN — OXYBUTYNIN CHLORIDE 5 MG: 5 TABLET ORAL at 09:29

## 2019-12-08 RX ADMIN — HYDROMORPHONE HYDROCHLORIDE 1 MG: 1 INJECTION, SOLUTION INTRAMUSCULAR; INTRAVENOUS; SUBCUTANEOUS at 11:29

## 2019-12-08 RX ADMIN — ASPIRIN 81 MG 81 MG: 81 TABLET ORAL at 09:29

## 2019-12-08 RX ADMIN — OXYBUTYNIN CHLORIDE 5 MG: 5 TABLET ORAL at 15:06

## 2019-12-08 RX ADMIN — CEFAZOLIN 500 MG: 1 INJECTION, POWDER, FOR SOLUTION INTRAMUSCULAR; INTRAVENOUS at 21:44

## 2019-12-08 RX ADMIN — MORPHINE SULFATE 15 MG: 15 TABLET, FILM COATED, EXTENDED RELEASE ORAL at 21:42

## 2019-12-08 RX ADMIN — HEPARIN SODIUM AND DEXTROSE 18 UNITS/KG/HR: 10000; 5 INJECTION INTRAVENOUS at 05:46

## 2019-12-08 RX ADMIN — METOPROLOL TARTRATE 50 MG: 50 TABLET, FILM COATED ORAL at 21:42

## 2019-12-08 RX ADMIN — MORPHINE SULFATE 1 MG: 2 INJECTION, SOLUTION INTRAMUSCULAR; INTRAVENOUS at 02:18

## 2019-12-08 RX ADMIN — CEFAZOLIN 500 MG: 1 INJECTION, POWDER, FOR SOLUTION INTRAMUSCULAR; INTRAVENOUS at 09:29

## 2019-12-08 RX ADMIN — METOPROLOL TARTRATE 25 MG: 25 TABLET ORAL at 09:29

## 2019-12-08 RX ADMIN — SODIUM BICARBONATE 650 MG: 650 TABLET ORAL at 21:41

## 2019-12-08 RX ADMIN — MORPHINE SULFATE 1 MG: 2 INJECTION, SOLUTION INTRAMUSCULAR; INTRAVENOUS at 07:16

## 2019-12-08 RX ADMIN — CEFAZOLIN 500 MG: 1 INJECTION, POWDER, FOR SOLUTION INTRAMUSCULAR; INTRAVENOUS at 02:18

## 2019-12-08 RX ADMIN — MORPHINE SULFATE 15 MG: 15 TABLET, FILM COATED, EXTENDED RELEASE ORAL at 09:29

## 2019-12-08 RX ADMIN — AMLODIPINE BESYLATE 10 MG: 10 TABLET ORAL at 12:21

## 2019-12-08 RX ADMIN — TAMSULOSIN HYDROCHLORIDE 0.4 MG: 0.4 CAPSULE ORAL at 09:28

## 2019-12-08 ASSESSMENT — PAIN SCALES - GENERAL
PAINLEVEL_OUTOF10: 10
PAINLEVEL_OUTOF10: 6
PAINLEVEL_OUTOF10: 0
PAINLEVEL_OUTOF10: 10
PAINLEVEL_OUTOF10: 10
PAINLEVEL_OUTOF10: 0
PAINLEVEL_OUTOF10: 3
PAINLEVEL_OUTOF10: 0
PAINLEVEL_OUTOF10: 0
PAINLEVEL_OUTOF10: 10
PAINLEVEL_OUTOF10: 9
PAINLEVEL_OUTOF10: 10
PAINLEVEL_OUTOF10: 4
PAINLEVEL_OUTOF10: 4

## 2019-12-08 ASSESSMENT — PAIN DESCRIPTION - PAIN TYPE
TYPE: ACUTE PAIN;SURGICAL PAIN
TYPE: SURGICAL PAIN
TYPE: ACUTE PAIN;SURGICAL PAIN

## 2019-12-08 ASSESSMENT — PAIN DESCRIPTION - LOCATION
LOCATION: BACK
LOCATION: FLANK

## 2019-12-08 ASSESSMENT — PAIN DESCRIPTION - ORIENTATION
ORIENTATION: LOWER;RIGHT
ORIENTATION: LOWER;RIGHT

## 2019-12-09 LAB
ABSOLUTE EOS #: <0.03 K/UL (ref 0–0.44)
ABSOLUTE IMMATURE GRANULOCYTE: 0.05 K/UL (ref 0–0.3)
ABSOLUTE LYMPH #: 1.48 K/UL (ref 1.1–3.7)
ABSOLUTE MONO #: 1.09 K/UL (ref 0.1–1.2)
ANION GAP SERPL CALCULATED.3IONS-SCNC: 12 MMOL/L (ref 9–17)
BASOPHILS # BLD: 0 % (ref 0–2)
BASOPHILS ABSOLUTE: 0.03 K/UL (ref 0–0.2)
BUN BLDV-MCNC: 36 MG/DL (ref 8–23)
BUN/CREAT BLD: 16 (ref 9–20)
CALCIUM SERPL-MCNC: 8.8 MG/DL (ref 8.6–10.4)
CHLORIDE BLD-SCNC: 110 MMOL/L (ref 98–107)
CO2: 18 MMOL/L (ref 20–31)
CREAT SERPL-MCNC: 2.32 MG/DL (ref 0.7–1.2)
DIFFERENTIAL TYPE: ABNORMAL
EKG ATRIAL RATE: 90 BPM
EKG P-R INTERVAL: 166 MS
EKG Q-T INTERVAL: 360 MS
EKG QRS DURATION: 98 MS
EKG QTC CALCULATION (BAZETT): 440 MS
EKG R AXIS: 138 DEGREES
EKG T AXIS: 155 DEGREES
EKG VENTRICULAR RATE: 90 BPM
EOSINOPHILS RELATIVE PERCENT: 0 % (ref 1–4)
GFR AFRICAN AMERICAN: 34 ML/MIN
GFR NON-AFRICAN AMERICAN: 28 ML/MIN
GFR SERPL CREATININE-BSD FRML MDRD: ABNORMAL ML/MIN/{1.73_M2}
GFR SERPL CREATININE-BSD FRML MDRD: ABNORMAL ML/MIN/{1.73_M2}
GLUCOSE BLD-MCNC: 149 MG/DL (ref 70–99)
HCT VFR BLD CALC: 28.8 % (ref 40.7–50.3)
HEMOGLOBIN: 9.1 G/DL (ref 13–17)
IMMATURE GRANULOCYTES: 1 %
LYMPHOCYTES # BLD: 15 % (ref 24–43)
MCH RBC QN AUTO: 28.8 PG (ref 25.2–33.5)
MCHC RBC AUTO-ENTMCNC: 31.6 G/DL (ref 28.4–34.8)
MCV RBC AUTO: 91.1 FL (ref 82.6–102.9)
MONOCYTES # BLD: 11 % (ref 3–12)
NRBC AUTOMATED: 0 PER 100 WBC
PDW BLD-RTO: 13.8 % (ref 11.8–14.4)
PLATELET # BLD: 154 K/UL (ref 138–453)
PLATELET ESTIMATE: ABNORMAL
PMV BLD AUTO: 11.4 FL (ref 8.1–13.5)
POTASSIUM SERPL-SCNC: 4.4 MMOL/L (ref 3.7–5.3)
RBC # BLD: 3.16 M/UL (ref 4.21–5.77)
RBC # BLD: ABNORMAL 10*6/UL
SEG NEUTROPHILS: 73 % (ref 36–65)
SEGMENTED NEUTROPHILS ABSOLUTE COUNT: 7.44 K/UL (ref 1.5–8.1)
SODIUM BLD-SCNC: 140 MMOL/L (ref 135–144)
TROPONIN INTERP: ABNORMAL
TROPONIN INTERP: ABNORMAL
TROPONIN T: ABNORMAL NG/ML
TROPONIN T: ABNORMAL NG/ML
TROPONIN, HIGH SENSITIVITY: 135 NG/L (ref 0–22)
TROPONIN, HIGH SENSITIVITY: 139 NG/L (ref 0–22)
WBC # BLD: 10.1 K/UL (ref 3.5–11.3)
WBC # BLD: ABNORMAL 10*3/UL

## 2019-12-09 PROCEDURE — 2060000000 HC ICU INTERMEDIATE R&B

## 2019-12-09 PROCEDURE — 81001 URINALYSIS AUTO W/SCOPE: CPT

## 2019-12-09 PROCEDURE — 84300 ASSAY OF URINE SODIUM: CPT

## 2019-12-09 PROCEDURE — 2580000003 HC RX 258: Performed by: UROLOGY

## 2019-12-09 PROCEDURE — 84484 ASSAY OF TROPONIN QUANT: CPT

## 2019-12-09 PROCEDURE — 82570 ASSAY OF URINE CREATININE: CPT

## 2019-12-09 PROCEDURE — 87205 SMEAR GRAM STAIN: CPT

## 2019-12-09 PROCEDURE — 93010 ELECTROCARDIOGRAM REPORT: CPT | Performed by: INTERNAL MEDICINE

## 2019-12-09 PROCEDURE — 6370000000 HC RX 637 (ALT 250 FOR IP): Performed by: NURSE PRACTITIONER

## 2019-12-09 PROCEDURE — 6370000000 HC RX 637 (ALT 250 FOR IP): Performed by: INTERNAL MEDICINE

## 2019-12-09 PROCEDURE — 6360000002 HC RX W HCPCS: Performed by: INTERNAL MEDICINE

## 2019-12-09 PROCEDURE — 82043 UR ALBUMIN QUANTITATIVE: CPT

## 2019-12-09 PROCEDURE — 80048 BASIC METABOLIC PNL TOTAL CA: CPT

## 2019-12-09 PROCEDURE — 97535 SELF CARE MNGMENT TRAINING: CPT

## 2019-12-09 PROCEDURE — 6360000002 HC RX W HCPCS: Performed by: UROLOGY

## 2019-12-09 PROCEDURE — 36415 COLL VENOUS BLD VENIPUNCTURE: CPT

## 2019-12-09 PROCEDURE — 6370000000 HC RX 637 (ALT 250 FOR IP): Performed by: UROLOGY

## 2019-12-09 PROCEDURE — 6370000000 HC RX 637 (ALT 250 FOR IP): Performed by: FAMILY MEDICINE

## 2019-12-09 PROCEDURE — 2580000003 HC RX 258: Performed by: FAMILY MEDICINE

## 2019-12-09 PROCEDURE — 85025 COMPLETE CBC W/AUTO DIFF WBC: CPT

## 2019-12-09 RX ORDER — HEPARIN SODIUM 5000 [USP'U]/ML
5000 INJECTION, SOLUTION INTRAVENOUS; SUBCUTANEOUS EVERY 8 HOURS SCHEDULED
Status: DISCONTINUED | OUTPATIENT
Start: 2019-12-09 | End: 2019-12-15 | Stop reason: HOSPADM

## 2019-12-09 RX ADMIN — Medication 10 ML: at 19:41

## 2019-12-09 RX ADMIN — CEFAZOLIN 500 MG: 1 INJECTION, POWDER, FOR SOLUTION INTRAMUSCULAR; INTRAVENOUS at 04:05

## 2019-12-09 RX ADMIN — FAMOTIDINE 20 MG: 20 TABLET ORAL at 21:07

## 2019-12-09 RX ADMIN — ATORVASTATIN CALCIUM 20 MG: 20 TABLET, FILM COATED ORAL at 21:07

## 2019-12-09 RX ADMIN — OXYBUTYNIN CHLORIDE 5 MG: 5 TABLET ORAL at 21:07

## 2019-12-09 RX ADMIN — SODIUM BICARBONATE 650 MG: 650 TABLET ORAL at 08:05

## 2019-12-09 RX ADMIN — ASPIRIN 81 MG 81 MG: 81 TABLET ORAL at 08:05

## 2019-12-09 RX ADMIN — ACETAMINOPHEN 650 MG: 325 TABLET ORAL at 04:05

## 2019-12-09 RX ADMIN — AMLODIPINE BESYLATE 10 MG: 10 TABLET ORAL at 08:05

## 2019-12-09 RX ADMIN — METOPROLOL TARTRATE 50 MG: 50 TABLET, FILM COATED ORAL at 21:07

## 2019-12-09 RX ADMIN — METOPROLOL TARTRATE 50 MG: 50 TABLET, FILM COATED ORAL at 08:05

## 2019-12-09 RX ADMIN — HEPARIN SODIUM 5000 UNITS: 5000 INJECTION INTRAVENOUS; SUBCUTANEOUS at 21:07

## 2019-12-09 RX ADMIN — MORPHINE SULFATE 15 MG: 15 TABLET, FILM COATED, EXTENDED RELEASE ORAL at 08:05

## 2019-12-09 RX ADMIN — TAMSULOSIN HYDROCHLORIDE 0.4 MG: 0.4 CAPSULE ORAL at 08:05

## 2019-12-09 RX ADMIN — HEPARIN SODIUM 5000 UNITS: 5000 INJECTION INTRAVENOUS; SUBCUTANEOUS at 14:39

## 2019-12-09 RX ADMIN — OXYBUTYNIN CHLORIDE 5 MG: 5 TABLET ORAL at 14:39

## 2019-12-09 RX ADMIN — SODIUM BICARBONATE 650 MG: 650 TABLET ORAL at 21:07

## 2019-12-09 RX ADMIN — CEFAZOLIN 500 MG: 1 INJECTION, POWDER, FOR SOLUTION INTRAMUSCULAR; INTRAVENOUS at 10:10

## 2019-12-09 RX ADMIN — OXYBUTYNIN CHLORIDE 5 MG: 5 TABLET ORAL at 08:05

## 2019-12-09 RX ADMIN — CEFAZOLIN 500 MG: 1 INJECTION, POWDER, FOR SOLUTION INTRAMUSCULAR; INTRAVENOUS at 19:41

## 2019-12-09 ASSESSMENT — PAIN DESCRIPTION - ONSET: ONSET: ON-GOING

## 2019-12-09 ASSESSMENT — PAIN SCALES - GENERAL
PAINLEVEL_OUTOF10: 0
PAINLEVEL_OUTOF10: 0
PAINLEVEL_OUTOF10: 3
PAINLEVEL_OUTOF10: 2
PAINLEVEL_OUTOF10: 0
PAINLEVEL_OUTOF10: 3
PAINLEVEL_OUTOF10: 5

## 2019-12-09 ASSESSMENT — PAIN DESCRIPTION - FREQUENCY: FREQUENCY: INTERMITTENT

## 2019-12-09 ASSESSMENT — PAIN DESCRIPTION - PAIN TYPE: TYPE: ACUTE PAIN

## 2019-12-09 ASSESSMENT — PAIN DESCRIPTION - LOCATION: LOCATION: HEAD

## 2019-12-09 ASSESSMENT — PAIN - FUNCTIONAL ASSESSMENT: PAIN_FUNCTIONAL_ASSESSMENT: ACTIVITIES ARE NOT PREVENTED

## 2019-12-09 ASSESSMENT — PAIN DESCRIPTION - PROGRESSION: CLINICAL_PROGRESSION: GRADUALLY WORSENING

## 2019-12-09 ASSESSMENT — PAIN DESCRIPTION - DESCRIPTORS: DESCRIPTORS: HEADACHE

## 2019-12-10 LAB
-: ABNORMAL
ABSOLUTE EOS #: 0.03 K/UL (ref 0–0.44)
ABSOLUTE IMMATURE GRANULOCYTE: 0.03 K/UL (ref 0–0.3)
ABSOLUTE LYMPH #: 1.68 K/UL (ref 1.1–3.7)
ABSOLUTE MONO #: 0.96 K/UL (ref 0.1–1.2)
AMORPHOUS: ABNORMAL
ANION GAP SERPL CALCULATED.3IONS-SCNC: 12 MMOL/L (ref 9–17)
BACTERIA: ABNORMAL
BASOPHILS # BLD: 1 % (ref 0–2)
BASOPHILS ABSOLUTE: 0.05 K/UL (ref 0–0.2)
BILIRUBIN URINE: NEGATIVE
BUN BLDV-MCNC: 38 MG/DL (ref 8–23)
BUN/CREAT BLD: 17 (ref 9–20)
CALCIUM SERPL-MCNC: 9 MG/DL (ref 8.6–10.4)
CASTS UA: ABNORMAL /LPF
CASTS UA: ABNORMAL /LPF
CHLORIDE BLD-SCNC: 107 MMOL/L (ref 98–107)
CO2: 20 MMOL/L (ref 20–31)
COLOR: YELLOW
COMMENT UA: ABNORMAL
CREAT SERPL-MCNC: 2.24 MG/DL (ref 0.7–1.2)
CREATININE URINE: 152.6 MG/DL (ref 39–259)
CRYSTALS, UA: ABNORMAL /HPF
DIFFERENTIAL TYPE: ABNORMAL
EOSINOPHIL,URINE: NORMAL
EOSINOPHILS RELATIVE PERCENT: 0 % (ref 1–4)
EPITHELIAL CELLS UA: ABNORMAL /HPF (ref 0–5)
GFR AFRICAN AMERICAN: 36 ML/MIN
GFR NON-AFRICAN AMERICAN: 29 ML/MIN
GFR SERPL CREATININE-BSD FRML MDRD: ABNORMAL ML/MIN/{1.73_M2}
GFR SERPL CREATININE-BSD FRML MDRD: ABNORMAL ML/MIN/{1.73_M2}
GLUCOSE BLD-MCNC: 119 MG/DL (ref 70–99)
GLUCOSE URINE: NEGATIVE
HCT VFR BLD CALC: 28.9 % (ref 40.7–50.3)
HEMOGLOBIN: 8.9 G/DL (ref 13–17)
IMMATURE GRANULOCYTES: 0 %
KETONES, URINE: NEGATIVE
LEUKOCYTE ESTERASE, URINE: NEGATIVE
LYMPHOCYTES # BLD: 19 % (ref 24–43)
MAGNESIUM: 1.7 MG/DL (ref 1.6–2.6)
MCH RBC QN AUTO: 28.3 PG (ref 25.2–33.5)
MCHC RBC AUTO-ENTMCNC: 30.8 G/DL (ref 28.4–34.8)
MCV RBC AUTO: 92 FL (ref 82.6–102.9)
MICROALBUMIN/CREAT 24H UR: 1361 MG/L
MICROALBUMIN/CREAT UR-RTO: 892 MCG/MG CREAT
MONOCYTES # BLD: 11 % (ref 3–12)
MUCUS: ABNORMAL
NITRITE, URINE: NEGATIVE
NRBC AUTOMATED: 0 PER 100 WBC
OTHER OBSERVATIONS UA: ABNORMAL
PDW BLD-RTO: 13.6 % (ref 11.8–14.4)
PH UA: 5 (ref 5–8)
PHOSPHORUS: 3.4 MG/DL (ref 2.5–4.5)
PLATELET # BLD: 155 K/UL (ref 138–453)
PLATELET ESTIMATE: ABNORMAL
PMV BLD AUTO: 11.7 FL (ref 8.1–13.5)
POTASSIUM SERPL-SCNC: 4.2 MMOL/L (ref 3.7–5.3)
PROTEIN UA: ABNORMAL
RBC # BLD: 3.14 M/UL (ref 4.21–5.77)
RBC # BLD: ABNORMAL 10*6/UL
RBC UA: ABNORMAL /HPF (ref 0–2)
RENAL EPITHELIAL, UA: ABNORMAL /HPF
SEG NEUTROPHILS: 69 % (ref 36–65)
SEGMENTED NEUTROPHILS ABSOLUTE COUNT: 6.16 K/UL (ref 1.5–8.1)
SODIUM BLD-SCNC: 139 MMOL/L (ref 135–144)
SODIUM,UR: 49 MMOL/L
SPECIFIC GRAVITY UA: 1.02 (ref 1–1.03)
TRICHOMONAS: ABNORMAL
TROPONIN INTERP: ABNORMAL
TROPONIN INTERP: ABNORMAL
TROPONIN T: ABNORMAL NG/ML
TROPONIN T: ABNORMAL NG/ML
TROPONIN, HIGH SENSITIVITY: 128 NG/L (ref 0–22)
TROPONIN, HIGH SENSITIVITY: 142 NG/L (ref 0–22)
TURBIDITY: ABNORMAL
URINE HGB: ABNORMAL
UROBILINOGEN, URINE: NORMAL
WBC # BLD: 8.9 K/UL (ref 3.5–11.3)
WBC # BLD: ABNORMAL 10*3/UL
WBC UA: ABNORMAL /HPF (ref 0–5)
YEAST: ABNORMAL

## 2019-12-10 PROCEDURE — 6370000000 HC RX 637 (ALT 250 FOR IP): Performed by: FAMILY MEDICINE

## 2019-12-10 PROCEDURE — 6360000002 HC RX W HCPCS: Performed by: INTERNAL MEDICINE

## 2019-12-10 PROCEDURE — 2580000003 HC RX 258: Performed by: FAMILY MEDICINE

## 2019-12-10 PROCEDURE — 6370000000 HC RX 637 (ALT 250 FOR IP): Performed by: NURSE PRACTITIONER

## 2019-12-10 PROCEDURE — 2580000003 HC RX 258: Performed by: UROLOGY

## 2019-12-10 PROCEDURE — 6370000000 HC RX 637 (ALT 250 FOR IP): Performed by: UROLOGY

## 2019-12-10 PROCEDURE — 6370000000 HC RX 637 (ALT 250 FOR IP): Performed by: INTERNAL MEDICINE

## 2019-12-10 PROCEDURE — 6360000002 HC RX W HCPCS: Performed by: UROLOGY

## 2019-12-10 PROCEDURE — 84100 ASSAY OF PHOSPHORUS: CPT

## 2019-12-10 PROCEDURE — 2060000000 HC ICU INTERMEDIATE R&B

## 2019-12-10 PROCEDURE — 80048 BASIC METABOLIC PNL TOTAL CA: CPT

## 2019-12-10 PROCEDURE — 84484 ASSAY OF TROPONIN QUANT: CPT

## 2019-12-10 PROCEDURE — 85025 COMPLETE CBC W/AUTO DIFF WBC: CPT

## 2019-12-10 PROCEDURE — 36415 COLL VENOUS BLD VENIPUNCTURE: CPT

## 2019-12-10 PROCEDURE — 83735 ASSAY OF MAGNESIUM: CPT

## 2019-12-10 RX ADMIN — CEFAZOLIN 500 MG: 1 INJECTION, POWDER, FOR SOLUTION INTRAMUSCULAR; INTRAVENOUS at 11:20

## 2019-12-10 RX ADMIN — ATORVASTATIN CALCIUM 20 MG: 20 TABLET, FILM COATED ORAL at 21:26

## 2019-12-10 RX ADMIN — FAMOTIDINE 20 MG: 20 TABLET ORAL at 21:26

## 2019-12-10 RX ADMIN — Medication 10 ML: at 11:20

## 2019-12-10 RX ADMIN — CEFAZOLIN SODIUM 500 MG: 500 INJECTION, POWDER, FOR SOLUTION INTRAMUSCULAR; INTRAVENOUS at 21:26

## 2019-12-10 RX ADMIN — HEPARIN SODIUM 5000 UNITS: 5000 INJECTION INTRAVENOUS; SUBCUTANEOUS at 15:04

## 2019-12-10 RX ADMIN — HEPARIN SODIUM 5000 UNITS: 5000 INJECTION INTRAVENOUS; SUBCUTANEOUS at 06:32

## 2019-12-10 RX ADMIN — METOPROLOL TARTRATE 50 MG: 50 TABLET, FILM COATED ORAL at 09:05

## 2019-12-10 RX ADMIN — CEFAZOLIN 500 MG: 1 INJECTION, POWDER, FOR SOLUTION INTRAMUSCULAR; INTRAVENOUS at 02:38

## 2019-12-10 RX ADMIN — SODIUM BICARBONATE 650 MG: 650 TABLET ORAL at 21:26

## 2019-12-10 RX ADMIN — METOPROLOL TARTRATE 50 MG: 50 TABLET, FILM COATED ORAL at 21:26

## 2019-12-10 RX ADMIN — OXYBUTYNIN CHLORIDE 5 MG: 5 TABLET ORAL at 15:04

## 2019-12-10 RX ADMIN — SODIUM BICARBONATE 650 MG: 650 TABLET ORAL at 09:05

## 2019-12-10 RX ADMIN — AMLODIPINE BESYLATE 10 MG: 10 TABLET ORAL at 09:05

## 2019-12-10 RX ADMIN — ACETAMINOPHEN 650 MG: 325 TABLET ORAL at 02:38

## 2019-12-10 RX ADMIN — OXYBUTYNIN CHLORIDE 5 MG: 5 TABLET ORAL at 21:26

## 2019-12-10 RX ADMIN — MORPHINE SULFATE 15 MG: 15 TABLET, FILM COATED, EXTENDED RELEASE ORAL at 09:05

## 2019-12-10 RX ADMIN — TAMSULOSIN HYDROCHLORIDE 0.4 MG: 0.4 CAPSULE ORAL at 09:05

## 2019-12-10 RX ADMIN — HEPARIN SODIUM 5000 UNITS: 5000 INJECTION INTRAVENOUS; SUBCUTANEOUS at 21:26

## 2019-12-10 RX ADMIN — ASPIRIN 81 MG 81 MG: 81 TABLET ORAL at 09:05

## 2019-12-10 RX ADMIN — OXYBUTYNIN CHLORIDE 5 MG: 5 TABLET ORAL at 09:05

## 2019-12-10 ASSESSMENT — PAIN DESCRIPTION - LOCATION: LOCATION: HEAD

## 2019-12-10 ASSESSMENT — PAIN DESCRIPTION - FREQUENCY: FREQUENCY: INTERMITTENT

## 2019-12-10 ASSESSMENT — PAIN SCALES - GENERAL
PAINLEVEL_OUTOF10: 5
PAINLEVEL_OUTOF10: 0
PAINLEVEL_OUTOF10: 3
PAINLEVEL_OUTOF10: 3
PAINLEVEL_OUTOF10: 4

## 2019-12-10 ASSESSMENT — PAIN DESCRIPTION - ONSET: ONSET: ON-GOING

## 2019-12-10 ASSESSMENT — PAIN DESCRIPTION - DESCRIPTORS: DESCRIPTORS: HEADACHE

## 2019-12-10 ASSESSMENT — PAIN DESCRIPTION - PROGRESSION: CLINICAL_PROGRESSION: NOT CHANGED

## 2019-12-10 ASSESSMENT — PAIN - FUNCTIONAL ASSESSMENT: PAIN_FUNCTIONAL_ASSESSMENT: ACTIVITIES ARE NOT PREVENTED

## 2019-12-10 ASSESSMENT — PAIN DESCRIPTION - PAIN TYPE: TYPE: ACUTE PAIN

## 2019-12-11 LAB
ABSOLUTE EOS #: 0.17 K/UL (ref 0–0.44)
ABSOLUTE IMMATURE GRANULOCYTE: 0.03 K/UL (ref 0–0.3)
ABSOLUTE LYMPH #: 1.37 K/UL (ref 1.1–3.7)
ABSOLUTE MONO #: 0.86 K/UL (ref 0.1–1.2)
ANION GAP SERPL CALCULATED.3IONS-SCNC: 12 MMOL/L (ref 9–17)
BASOPHILS # BLD: 1 % (ref 0–2)
BASOPHILS ABSOLUTE: 0.04 K/UL (ref 0–0.2)
BUN BLDV-MCNC: 41 MG/DL (ref 8–23)
BUN/CREAT BLD: 19 (ref 9–20)
CALCIUM SERPL-MCNC: 9.1 MG/DL (ref 8.6–10.4)
CHLORIDE BLD-SCNC: 107 MMOL/L (ref 98–107)
CO2: 21 MMOL/L (ref 20–31)
CREAT SERPL-MCNC: 2.2 MG/DL (ref 0.7–1.2)
DIFFERENTIAL TYPE: ABNORMAL
EOSINOPHILS RELATIVE PERCENT: 2 % (ref 1–4)
GFR AFRICAN AMERICAN: 36 ML/MIN
GFR NON-AFRICAN AMERICAN: 30 ML/MIN
GFR SERPL CREATININE-BSD FRML MDRD: ABNORMAL ML/MIN/{1.73_M2}
GFR SERPL CREATININE-BSD FRML MDRD: ABNORMAL ML/MIN/{1.73_M2}
GLUCOSE BLD-MCNC: 128 MG/DL (ref 70–99)
HCT VFR BLD CALC: 29.7 % (ref 40.7–50.3)
HEMOGLOBIN: 9.5 G/DL (ref 13–17)
IMMATURE GRANULOCYTES: 0 %
LYMPHOCYTES # BLD: 17 % (ref 24–43)
MAGNESIUM: 1.8 MG/DL (ref 1.6–2.6)
MCH RBC QN AUTO: 29.1 PG (ref 25.2–33.5)
MCHC RBC AUTO-ENTMCNC: 32 G/DL (ref 28.4–34.8)
MCV RBC AUTO: 91.1 FL (ref 82.6–102.9)
MONOCYTES # BLD: 11 % (ref 3–12)
NRBC AUTOMATED: 0 PER 100 WBC
PDW BLD-RTO: 13.5 % (ref 11.8–14.4)
PHOSPHORUS: 3.3 MG/DL (ref 2.5–4.5)
PLATELET # BLD: 161 K/UL (ref 138–453)
PLATELET ESTIMATE: ABNORMAL
PMV BLD AUTO: 11.1 FL (ref 8.1–13.5)
POTASSIUM SERPL-SCNC: 4.1 MMOL/L (ref 3.7–5.3)
RBC # BLD: 3.26 M/UL (ref 4.21–5.77)
RBC # BLD: ABNORMAL 10*6/UL
SEG NEUTROPHILS: 69 % (ref 36–65)
SEGMENTED NEUTROPHILS ABSOLUTE COUNT: 5.41 K/UL (ref 1.5–8.1)
SODIUM BLD-SCNC: 140 MMOL/L (ref 135–144)
STONE COMPOSITION: NORMAL
STONE DESCRIPTION: NORMAL
STONE MASS: 13 MG
STONE NUMBER: 1
STONE SIZE: NORMAL MM
TROPONIN INTERP: ABNORMAL
TROPONIN INTERP: ABNORMAL
TROPONIN T: ABNORMAL NG/ML
TROPONIN T: ABNORMAL NG/ML
TROPONIN, HIGH SENSITIVITY: 119 NG/L (ref 0–22)
TROPONIN, HIGH SENSITIVITY: 124 NG/L (ref 0–22)
WBC # BLD: 7.9 K/UL (ref 3.5–11.3)
WBC # BLD: ABNORMAL 10*3/UL

## 2019-12-11 PROCEDURE — 2060000000 HC ICU INTERMEDIATE R&B

## 2019-12-11 PROCEDURE — 6360000002 HC RX W HCPCS: Performed by: INTERNAL MEDICINE

## 2019-12-11 PROCEDURE — 6370000000 HC RX 637 (ALT 250 FOR IP): Performed by: FAMILY MEDICINE

## 2019-12-11 PROCEDURE — 6360000002 HC RX W HCPCS: Performed by: FAMILY MEDICINE

## 2019-12-11 PROCEDURE — 36415 COLL VENOUS BLD VENIPUNCTURE: CPT

## 2019-12-11 PROCEDURE — 2580000003 HC RX 258: Performed by: FAMILY MEDICINE

## 2019-12-11 PROCEDURE — 6370000000 HC RX 637 (ALT 250 FOR IP): Performed by: INTERNAL MEDICINE

## 2019-12-11 PROCEDURE — 80048 BASIC METABOLIC PNL TOTAL CA: CPT

## 2019-12-11 PROCEDURE — 2580000003 HC RX 258: Performed by: UROLOGY

## 2019-12-11 PROCEDURE — 94640 AIRWAY INHALATION TREATMENT: CPT

## 2019-12-11 PROCEDURE — 85025 COMPLETE CBC W/AUTO DIFF WBC: CPT

## 2019-12-11 PROCEDURE — 84484 ASSAY OF TROPONIN QUANT: CPT

## 2019-12-11 PROCEDURE — 6360000002 HC RX W HCPCS: Performed by: UROLOGY

## 2019-12-11 PROCEDURE — 6370000000 HC RX 637 (ALT 250 FOR IP): Performed by: UROLOGY

## 2019-12-11 PROCEDURE — 83735 ASSAY OF MAGNESIUM: CPT

## 2019-12-11 PROCEDURE — 84100 ASSAY OF PHOSPHORUS: CPT

## 2019-12-11 RX ORDER — METHYLPREDNISOLONE SODIUM SUCCINATE 125 MG/2ML
80 INJECTION, POWDER, LYOPHILIZED, FOR SOLUTION INTRAMUSCULAR; INTRAVENOUS EVERY 8 HOURS
Status: DISCONTINUED | OUTPATIENT
Start: 2019-12-11 | End: 2019-12-14

## 2019-12-11 RX ORDER — IPRATROPIUM BROMIDE AND ALBUTEROL SULFATE 2.5; .5 MG/3ML; MG/3ML
1 SOLUTION RESPIRATORY (INHALATION) EVERY 4 HOURS PRN
Status: DISCONTINUED | OUTPATIENT
Start: 2019-12-11 | End: 2019-12-15 | Stop reason: HOSPADM

## 2019-12-11 RX ORDER — BENZONATATE 100 MG/1
100 CAPSULE ORAL 3 TIMES DAILY PRN
Status: DISCONTINUED | OUTPATIENT
Start: 2019-12-11 | End: 2019-12-15 | Stop reason: HOSPADM

## 2019-12-11 RX ORDER — IPRATROPIUM BROMIDE AND ALBUTEROL SULFATE 2.5; .5 MG/3ML; MG/3ML
1 SOLUTION RESPIRATORY (INHALATION)
Status: DISCONTINUED | OUTPATIENT
Start: 2019-12-11 | End: 2019-12-15 | Stop reason: HOSPADM

## 2019-12-11 RX ADMIN — CEFAZOLIN SODIUM 500 MG: 500 INJECTION, POWDER, FOR SOLUTION INTRAMUSCULAR; INTRAVENOUS at 03:38

## 2019-12-11 RX ADMIN — Medication 10 ML: at 09:20

## 2019-12-11 RX ADMIN — ASPIRIN 81 MG 81 MG: 81 TABLET ORAL at 09:20

## 2019-12-11 RX ADMIN — TAMSULOSIN HYDROCHLORIDE 0.4 MG: 0.4 CAPSULE ORAL at 09:30

## 2019-12-11 RX ADMIN — CEFTRIAXONE SODIUM 1 G: 1 INJECTION, POWDER, FOR SOLUTION INTRAMUSCULAR; INTRAVENOUS at 21:20

## 2019-12-11 RX ADMIN — HEPARIN SODIUM 5000 UNITS: 5000 INJECTION INTRAVENOUS; SUBCUTANEOUS at 21:21

## 2019-12-11 RX ADMIN — BENZONATATE 100 MG: 100 CAPSULE ORAL at 14:16

## 2019-12-11 RX ADMIN — Medication 10 ML: at 21:21

## 2019-12-11 RX ADMIN — CEFAZOLIN SODIUM 500 MG: 500 INJECTION, POWDER, FOR SOLUTION INTRAMUSCULAR; INTRAVENOUS at 18:18

## 2019-12-11 RX ADMIN — SODIUM BICARBONATE 650 MG: 650 TABLET ORAL at 21:20

## 2019-12-11 RX ADMIN — BENZONATATE 100 MG: 100 CAPSULE ORAL at 22:23

## 2019-12-11 RX ADMIN — CEFAZOLIN SODIUM 500 MG: 500 INJECTION, POWDER, FOR SOLUTION INTRAMUSCULAR; INTRAVENOUS at 09:23

## 2019-12-11 RX ADMIN — METOPROLOL TARTRATE 50 MG: 50 TABLET, FILM COATED ORAL at 21:20

## 2019-12-11 RX ADMIN — MORPHINE SULFATE 15 MG: 15 TABLET, FILM COATED, EXTENDED RELEASE ORAL at 09:20

## 2019-12-11 RX ADMIN — IPRATROPIUM BROMIDE AND ALBUTEROL SULFATE 1 AMPULE: .5; 3 SOLUTION RESPIRATORY (INHALATION) at 22:03

## 2019-12-11 RX ADMIN — SODIUM BICARBONATE 650 MG: 650 TABLET ORAL at 09:20

## 2019-12-11 RX ADMIN — METHYLPREDNISOLONE SODIUM SUCCINATE 80 MG: 125 INJECTION, POWDER, FOR SOLUTION INTRAMUSCULAR; INTRAVENOUS at 21:20

## 2019-12-11 ASSESSMENT — PAIN SCALES - GENERAL
PAINLEVEL_OUTOF10: 3
PAINLEVEL_OUTOF10: 0

## 2019-12-12 ENCOUNTER — APPOINTMENT (OUTPATIENT)
Dept: GENERAL RADIOLOGY | Age: 69
DRG: 659 | End: 2019-12-12
Payer: MEDICARE

## 2019-12-12 LAB
ABSOLUTE EOS #: 0 K/UL (ref 0–0.44)
ABSOLUTE IMMATURE GRANULOCYTE: 0.06 K/UL (ref 0–0.3)
ABSOLUTE LYMPH #: 0.38 K/UL (ref 1.1–3.7)
ABSOLUTE MONO #: 0.06 K/UL (ref 0.1–1.2)
ANION GAP SERPL CALCULATED.3IONS-SCNC: 12 MMOL/L (ref 9–17)
BASOPHILS # BLD: 0 % (ref 0–2)
BASOPHILS ABSOLUTE: 0 K/UL (ref 0–0.2)
BUN BLDV-MCNC: 41 MG/DL (ref 8–23)
BUN/CREAT BLD: 20 (ref 9–20)
CALCIUM SERPL-MCNC: 9 MG/DL (ref 8.6–10.4)
CHLORIDE BLD-SCNC: 105 MMOL/L (ref 98–107)
CO2: 21 MMOL/L (ref 20–31)
CREAT SERPL-MCNC: 2.02 MG/DL (ref 0.7–1.2)
DIFFERENTIAL TYPE: ABNORMAL
EOSINOPHILS RELATIVE PERCENT: 0 % (ref 1–4)
GFR AFRICAN AMERICAN: 40 ML/MIN
GFR NON-AFRICAN AMERICAN: 33 ML/MIN
GFR SERPL CREATININE-BSD FRML MDRD: ABNORMAL ML/MIN/{1.73_M2}
GFR SERPL CREATININE-BSD FRML MDRD: ABNORMAL ML/MIN/{1.73_M2}
GLUCOSE BLD-MCNC: 267 MG/DL (ref 70–99)
HCT VFR BLD CALC: 31.8 % (ref 40.7–50.3)
HEMOGLOBIN: 10 G/DL (ref 13–17)
IMMATURE GRANULOCYTES: 1 %
LYMPHOCYTES # BLD: 6 % (ref 24–43)
MAGNESIUM: 1.9 MG/DL (ref 1.6–2.6)
MCH RBC QN AUTO: 28.6 PG (ref 25.2–33.5)
MCHC RBC AUTO-ENTMCNC: 31.4 G/DL (ref 28.4–34.8)
MCV RBC AUTO: 90.9 FL (ref 82.6–102.9)
MONOCYTES # BLD: 1 % (ref 3–12)
NRBC AUTOMATED: 0 PER 100 WBC
PDW BLD-RTO: 13.6 % (ref 11.8–14.4)
PHOSPHORUS: 3.6 MG/DL (ref 2.5–4.5)
PLATELET # BLD: 194 K/UL (ref 138–453)
PLATELET ESTIMATE: ABNORMAL
PMV BLD AUTO: 11.7 FL (ref 8.1–13.5)
POTASSIUM SERPL-SCNC: 4.5 MMOL/L (ref 3.7–5.3)
RBC # BLD: 3.5 M/UL (ref 4.21–5.77)
RBC # BLD: ABNORMAL 10*6/UL
SEG NEUTROPHILS: 92 % (ref 36–65)
SEGMENTED NEUTROPHILS ABSOLUTE COUNT: 5.8 K/UL (ref 1.5–8.1)
SODIUM BLD-SCNC: 138 MMOL/L (ref 135–144)
TROPONIN INTERP: ABNORMAL
TROPONIN INTERP: ABNORMAL
TROPONIN T: ABNORMAL NG/ML
TROPONIN T: ABNORMAL NG/ML
TROPONIN, HIGH SENSITIVITY: 111 NG/L (ref 0–22)
TROPONIN, HIGH SENSITIVITY: 99 NG/L (ref 0–22)
WBC # BLD: 6.3 K/UL (ref 3.5–11.3)
WBC # BLD: ABNORMAL 10*3/UL

## 2019-12-12 PROCEDURE — 2580000003 HC RX 258: Performed by: FAMILY MEDICINE

## 2019-12-12 PROCEDURE — 6360000002 HC RX W HCPCS: Performed by: FAMILY MEDICINE

## 2019-12-12 PROCEDURE — 84100 ASSAY OF PHOSPHORUS: CPT

## 2019-12-12 PROCEDURE — 6370000000 HC RX 637 (ALT 250 FOR IP): Performed by: INTERNAL MEDICINE

## 2019-12-12 PROCEDURE — 6370000000 HC RX 637 (ALT 250 FOR IP): Performed by: FAMILY MEDICINE

## 2019-12-12 PROCEDURE — 6360000002 HC RX W HCPCS: Performed by: INTERNAL MEDICINE

## 2019-12-12 PROCEDURE — 94760 N-INVAS EAR/PLS OXIMETRY 1: CPT

## 2019-12-12 PROCEDURE — 6370000000 HC RX 637 (ALT 250 FOR IP): Performed by: NURSE PRACTITIONER

## 2019-12-12 PROCEDURE — 80048 BASIC METABOLIC PNL TOTAL CA: CPT

## 2019-12-12 PROCEDURE — 97530 THERAPEUTIC ACTIVITIES: CPT

## 2019-12-12 PROCEDURE — 71046 X-RAY EXAM CHEST 2 VIEWS: CPT

## 2019-12-12 PROCEDURE — 6370000000 HC RX 637 (ALT 250 FOR IP): Performed by: UROLOGY

## 2019-12-12 PROCEDURE — 83735 ASSAY OF MAGNESIUM: CPT

## 2019-12-12 PROCEDURE — 84484 ASSAY OF TROPONIN QUANT: CPT

## 2019-12-12 PROCEDURE — 36415 COLL VENOUS BLD VENIPUNCTURE: CPT

## 2019-12-12 PROCEDURE — 2060000000 HC ICU INTERMEDIATE R&B

## 2019-12-12 PROCEDURE — 85025 COMPLETE CBC W/AUTO DIFF WBC: CPT

## 2019-12-12 PROCEDURE — 94640 AIRWAY INHALATION TREATMENT: CPT

## 2019-12-12 RX ORDER — GUAIFENESIN/DEXTROMETHORPHAN 100-10MG/5
10 SYRUP ORAL EVERY 6 HOURS PRN
Status: DISCONTINUED | OUTPATIENT
Start: 2019-12-12 | End: 2019-12-15 | Stop reason: HOSPADM

## 2019-12-12 RX ORDER — FUROSEMIDE 10 MG/ML
20 INJECTION INTRAMUSCULAR; INTRAVENOUS ONCE
Status: DISCONTINUED | OUTPATIENT
Start: 2019-12-12 | End: 2019-12-15 | Stop reason: HOSPADM

## 2019-12-12 RX ADMIN — OXYBUTYNIN CHLORIDE 5 MG: 5 TABLET ORAL at 09:29

## 2019-12-12 RX ADMIN — IPRATROPIUM BROMIDE AND ALBUTEROL SULFATE 1 AMPULE: .5; 3 SOLUTION RESPIRATORY (INHALATION) at 15:05

## 2019-12-12 RX ADMIN — BENZONATATE 100 MG: 100 CAPSULE ORAL at 20:36

## 2019-12-12 RX ADMIN — METHYLPREDNISOLONE SODIUM SUCCINATE 80 MG: 125 INJECTION, POWDER, FOR SOLUTION INTRAMUSCULAR; INTRAVENOUS at 20:38

## 2019-12-12 RX ADMIN — ASPIRIN 81 MG 81 MG: 81 TABLET ORAL at 09:29

## 2019-12-12 RX ADMIN — METOPROLOL TARTRATE 50 MG: 50 TABLET, FILM COATED ORAL at 09:29

## 2019-12-12 RX ADMIN — Medication 10 ML: at 09:30

## 2019-12-12 RX ADMIN — Medication 10 ML: at 20:38

## 2019-12-12 RX ADMIN — TAMSULOSIN HYDROCHLORIDE 0.4 MG: 0.4 CAPSULE ORAL at 09:29

## 2019-12-12 RX ADMIN — IPRATROPIUM BROMIDE AND ALBUTEROL SULFATE 1 AMPULE: .5; 3 SOLUTION RESPIRATORY (INHALATION) at 11:26

## 2019-12-12 RX ADMIN — BENZONATATE 100 MG: 100 CAPSULE ORAL at 12:16

## 2019-12-12 RX ADMIN — HEPARIN SODIUM 5000 UNITS: 5000 INJECTION INTRAVENOUS; SUBCUTANEOUS at 20:39

## 2019-12-12 RX ADMIN — CEFTRIAXONE SODIUM 1 G: 1 INJECTION, POWDER, FOR SOLUTION INTRAMUSCULAR; INTRAVENOUS at 20:38

## 2019-12-12 RX ADMIN — OXYBUTYNIN CHLORIDE 5 MG: 5 TABLET ORAL at 20:37

## 2019-12-12 RX ADMIN — ACETAMINOPHEN 650 MG: 325 TABLET ORAL at 06:50

## 2019-12-12 RX ADMIN — MORPHINE SULFATE 15 MG: 15 TABLET, FILM COATED, EXTENDED RELEASE ORAL at 09:29

## 2019-12-12 RX ADMIN — ACETAMINOPHEN 650 MG: 325 TABLET ORAL at 20:36

## 2019-12-12 RX ADMIN — GUAIFENESIN AND DEXTROMETHORPHAN 10 ML: 100; 10 SYRUP ORAL at 16:59

## 2019-12-12 RX ADMIN — METHYLPREDNISOLONE SODIUM SUCCINATE 80 MG: 125 INJECTION, POWDER, FOR SOLUTION INTRAMUSCULAR; INTRAVENOUS at 12:16

## 2019-12-12 RX ADMIN — METOPROLOL TARTRATE 50 MG: 50 TABLET, FILM COATED ORAL at 20:37

## 2019-12-12 RX ADMIN — ATORVASTATIN CALCIUM 20 MG: 20 TABLET, FILM COATED ORAL at 20:37

## 2019-12-12 RX ADMIN — METHYLPREDNISOLONE SODIUM SUCCINATE 80 MG: 125 INJECTION, POWDER, FOR SOLUTION INTRAMUSCULAR; INTRAVENOUS at 04:51

## 2019-12-12 RX ADMIN — SODIUM BICARBONATE 650 MG: 650 TABLET ORAL at 09:29

## 2019-12-12 RX ADMIN — HYDROMORPHONE HYDROCHLORIDE 1 MG: 1 INJECTION, SOLUTION INTRAMUSCULAR; INTRAVENOUS; SUBCUTANEOUS at 04:52

## 2019-12-12 RX ADMIN — SODIUM BICARBONATE 650 MG: 650 TABLET ORAL at 20:37

## 2019-12-12 RX ADMIN — AMLODIPINE BESYLATE 10 MG: 10 TABLET ORAL at 09:29

## 2019-12-12 RX ADMIN — IPRATROPIUM BROMIDE AND ALBUTEROL SULFATE 1 AMPULE: .5; 3 SOLUTION RESPIRATORY (INHALATION) at 09:17

## 2019-12-12 RX ADMIN — FAMOTIDINE 20 MG: 20 TABLET ORAL at 20:37

## 2019-12-12 ASSESSMENT — PAIN SCALES - GENERAL
PAINLEVEL_OUTOF10: 0
PAINLEVEL_OUTOF10: 7
PAINLEVEL_OUTOF10: 0
PAINLEVEL_OUTOF10: 3
PAINLEVEL_OUTOF10: 6

## 2019-12-13 LAB
ABSOLUTE EOS #: 0 K/UL (ref 0–0.4)
ABSOLUTE IMMATURE GRANULOCYTE: 0.14 K/UL (ref 0–0.3)
ABSOLUTE LYMPH #: 0.41 K/UL (ref 1–4.8)
ABSOLUTE MONO #: 0.28 K/UL (ref 0.2–0.8)
ANION GAP SERPL CALCULATED.3IONS-SCNC: 11 MMOL/L (ref 9–17)
BASOPHILS # BLD: 0 %
BASOPHILS ABSOLUTE: 0 K/UL (ref 0–0.2)
BUN BLDV-MCNC: 51 MG/DL (ref 8–23)
BUN/CREAT BLD: 24 (ref 9–20)
CALCIUM SERPL-MCNC: 9.4 MG/DL (ref 8.6–10.4)
CHLORIDE BLD-SCNC: 106 MMOL/L (ref 98–107)
CO2: 23 MMOL/L (ref 20–31)
CREAT SERPL-MCNC: 2.16 MG/DL (ref 0.7–1.2)
DIFFERENTIAL TYPE: ABNORMAL
EOSINOPHILS RELATIVE PERCENT: 0 % (ref 1–4)
GFR AFRICAN AMERICAN: 37 ML/MIN
GFR NON-AFRICAN AMERICAN: 31 ML/MIN
GFR SERPL CREATININE-BSD FRML MDRD: ABNORMAL ML/MIN/{1.73_M2}
GFR SERPL CREATININE-BSD FRML MDRD: ABNORMAL ML/MIN/{1.73_M2}
GLUCOSE BLD-MCNC: 234 MG/DL (ref 70–99)
HCT VFR BLD CALC: 29.4 % (ref 40.7–50.3)
HEMOGLOBIN: 9.3 G/DL (ref 13–17)
IMMATURE GRANULOCYTES: 1 %
LYMPHOCYTES # BLD: 3 % (ref 24–44)
MAGNESIUM: 1.9 MG/DL (ref 1.6–2.6)
MCH RBC QN AUTO: 28.4 PG (ref 25.2–33.5)
MCHC RBC AUTO-ENTMCNC: 31.6 G/DL (ref 28.4–34.8)
MCV RBC AUTO: 89.9 FL (ref 82.6–102.9)
MONOCYTES # BLD: 2 % (ref 1–7)
NRBC AUTOMATED: 0 PER 100 WBC
PDW BLD-RTO: 13.7 % (ref 11.8–14.4)
PHOSPHORUS: 3.6 MG/DL (ref 2.5–4.5)
PLATELET # BLD: 205 K/UL (ref 138–453)
PLATELET ESTIMATE: ABNORMAL
PMV BLD AUTO: 11.3 FL (ref 8.1–13.5)
POTASSIUM SERPL-SCNC: 4.5 MMOL/L (ref 3.7–5.3)
RBC # BLD: 3.27 M/UL (ref 4.21–5.77)
RBC # BLD: ABNORMAL 10*6/UL
SEG NEUTROPHILS: 94 % (ref 36–66)
SEGMENTED NEUTROPHILS ABSOLUTE COUNT: 12.97 K/UL (ref 1.8–7.7)
SODIUM BLD-SCNC: 140 MMOL/L (ref 135–144)
TROPONIN INTERP: ABNORMAL
TROPONIN T: ABNORMAL NG/ML
TROPONIN, HIGH SENSITIVITY: 194 NG/L (ref 0–22)
TROPONIN, HIGH SENSITIVITY: 210 NG/L (ref 0–22)
TROPONIN, HIGH SENSITIVITY: 215 NG/L (ref 0–22)
WBC # BLD: 13.8 K/UL (ref 3.5–11.3)
WBC # BLD: ABNORMAL 10*3/UL

## 2019-12-13 PROCEDURE — 6370000000 HC RX 637 (ALT 250 FOR IP): Performed by: INTERNAL MEDICINE

## 2019-12-13 PROCEDURE — 83735 ASSAY OF MAGNESIUM: CPT

## 2019-12-13 PROCEDURE — 93005 ELECTROCARDIOGRAM TRACING: CPT | Performed by: NURSE PRACTITIONER

## 2019-12-13 PROCEDURE — 85025 COMPLETE CBC W/AUTO DIFF WBC: CPT

## 2019-12-13 PROCEDURE — 2060000000 HC ICU INTERMEDIATE R&B

## 2019-12-13 PROCEDURE — 6370000000 HC RX 637 (ALT 250 FOR IP): Performed by: UROLOGY

## 2019-12-13 PROCEDURE — 6360000002 HC RX W HCPCS: Performed by: FAMILY MEDICINE

## 2019-12-13 PROCEDURE — 36415 COLL VENOUS BLD VENIPUNCTURE: CPT

## 2019-12-13 PROCEDURE — 2580000003 HC RX 258: Performed by: FAMILY MEDICINE

## 2019-12-13 PROCEDURE — 6370000000 HC RX 637 (ALT 250 FOR IP): Performed by: FAMILY MEDICINE

## 2019-12-13 PROCEDURE — 80048 BASIC METABOLIC PNL TOTAL CA: CPT

## 2019-12-13 PROCEDURE — 84100 ASSAY OF PHOSPHORUS: CPT

## 2019-12-13 PROCEDURE — 84484 ASSAY OF TROPONIN QUANT: CPT

## 2019-12-13 PROCEDURE — 6370000000 HC RX 637 (ALT 250 FOR IP): Performed by: NURSE PRACTITIONER

## 2019-12-13 PROCEDURE — 6360000002 HC RX W HCPCS: Performed by: INTERNAL MEDICINE

## 2019-12-13 RX ORDER — ATORVASTATIN CALCIUM 20 MG/1
20 TABLET, FILM COATED ORAL NIGHTLY
Qty: 30 TABLET | Refills: 0 | Status: SHIPPED | OUTPATIENT
Start: 2019-12-13 | End: 2020-02-17

## 2019-12-13 RX ORDER — TAMSULOSIN HYDROCHLORIDE 0.4 MG/1
0.4 CAPSULE ORAL DAILY
Qty: 30 CAPSULE | Refills: 0 | Status: SHIPPED | OUTPATIENT
Start: 2019-12-14

## 2019-12-13 RX ORDER — GUAIFENESIN/DEXTROMETHORPHAN 100-10MG/5
10 SYRUP ORAL EVERY 6 HOURS PRN
Qty: 120 ML | Refills: 0 | Status: SHIPPED | OUTPATIENT
Start: 2019-12-13 | End: 2019-12-23

## 2019-12-13 RX ORDER — CEFUROXIME AXETIL 250 MG/1
250 TABLET ORAL 2 TIMES DAILY
Qty: 14 TABLET | Refills: 0 | Status: SHIPPED | OUTPATIENT
Start: 2019-12-13 | End: 2019-12-20

## 2019-12-13 RX ORDER — METOPROLOL TARTRATE 50 MG/1
50 TABLET, FILM COATED ORAL 2 TIMES DAILY
Qty: 60 TABLET | Refills: 0 | Status: ON HOLD | OUTPATIENT
Start: 2019-12-13 | End: 2020-01-30 | Stop reason: HOSPADM

## 2019-12-13 RX ORDER — AMLODIPINE BESYLATE 10 MG/1
10 TABLET ORAL DAILY
Qty: 30 TABLET | Refills: 0 | Status: ON HOLD | OUTPATIENT
Start: 2019-12-14 | End: 2020-01-30 | Stop reason: HOSPADM

## 2019-12-13 RX ADMIN — BENZONATATE 100 MG: 100 CAPSULE ORAL at 10:38

## 2019-12-13 RX ADMIN — HEPARIN SODIUM 5000 UNITS: 5000 INJECTION INTRAVENOUS; SUBCUTANEOUS at 12:48

## 2019-12-13 RX ADMIN — METHYLPREDNISOLONE SODIUM SUCCINATE 80 MG: 125 INJECTION, POWDER, FOR SOLUTION INTRAMUSCULAR; INTRAVENOUS at 12:48

## 2019-12-13 RX ADMIN — TAMSULOSIN HYDROCHLORIDE 0.4 MG: 0.4 CAPSULE ORAL at 08:14

## 2019-12-13 RX ADMIN — ASPIRIN 81 MG 81 MG: 81 TABLET ORAL at 08:14

## 2019-12-13 RX ADMIN — METHYLPREDNISOLONE SODIUM SUCCINATE 80 MG: 125 INJECTION, POWDER, FOR SOLUTION INTRAMUSCULAR; INTRAVENOUS at 04:34

## 2019-12-13 RX ADMIN — GUAIFENESIN AND DEXTROMETHORPHAN 10 ML: 100; 10 SYRUP ORAL at 02:36

## 2019-12-13 RX ADMIN — OXYBUTYNIN CHLORIDE 5 MG: 5 TABLET ORAL at 08:14

## 2019-12-13 RX ADMIN — GUAIFENESIN AND DEXTROMETHORPHAN 10 ML: 100; 10 SYRUP ORAL at 08:17

## 2019-12-13 RX ADMIN — MORPHINE SULFATE 15 MG: 15 TABLET, FILM COATED, EXTENDED RELEASE ORAL at 08:14

## 2019-12-13 RX ADMIN — BENZONATATE 100 MG: 100 CAPSULE ORAL at 02:36

## 2019-12-13 RX ADMIN — METOPROLOL TARTRATE 50 MG: 50 TABLET, FILM COATED ORAL at 08:14

## 2019-12-13 RX ADMIN — OXYBUTYNIN CHLORIDE 5 MG: 5 TABLET ORAL at 12:48

## 2019-12-13 RX ADMIN — GUAIFENESIN AND DEXTROMETHORPHAN 10 ML: 100; 10 SYRUP ORAL at 14:59

## 2019-12-13 RX ADMIN — SODIUM BICARBONATE 650 MG: 650 TABLET ORAL at 08:14

## 2019-12-13 RX ADMIN — Medication 10 ML: at 08:14

## 2019-12-13 RX ADMIN — AMLODIPINE BESYLATE 10 MG: 10 TABLET ORAL at 08:14

## 2019-12-13 ASSESSMENT — PAIN SCALES - GENERAL
PAINLEVEL_OUTOF10: 0
PAINLEVEL_OUTOF10: 0

## 2019-12-14 ENCOUNTER — APPOINTMENT (OUTPATIENT)
Dept: NUCLEAR MEDICINE | Age: 69
DRG: 659 | End: 2019-12-14
Payer: MEDICARE

## 2019-12-14 LAB
ABSOLUTE EOS #: 0 K/UL (ref 0–0.44)
ABSOLUTE IMMATURE GRANULOCYTE: 0.15 K/UL (ref 0–0.3)
ABSOLUTE LYMPH #: 0.62 K/UL (ref 1.1–3.7)
ABSOLUTE MONO #: 0.92 K/UL (ref 0.1–1.2)
ANION GAP SERPL CALCULATED.3IONS-SCNC: 11 MMOL/L (ref 9–17)
BASOPHILS # BLD: 0 % (ref 0–2)
BASOPHILS ABSOLUTE: 0 K/UL (ref 0–0.2)
BUN BLDV-MCNC: 63 MG/DL (ref 8–23)
BUN/CREAT BLD: 26 (ref 9–20)
CALCIUM SERPL-MCNC: 9.2 MG/DL (ref 8.6–10.4)
CHLORIDE BLD-SCNC: 108 MMOL/L (ref 98–107)
CO2: 23 MMOL/L (ref 20–31)
CREAT SERPL-MCNC: 2.39 MG/DL (ref 0.7–1.2)
DIFFERENTIAL TYPE: ABNORMAL
EKG ATRIAL RATE: 78 BPM
EKG P AXIS: 60 DEGREES
EKG P-R INTERVAL: 160 MS
EKG Q-T INTERVAL: 394 MS
EKG QRS DURATION: 92 MS
EKG QTC CALCULATION (BAZETT): 449 MS
EKG R AXIS: 68 DEGREES
EKG T AXIS: -34 DEGREES
EKG VENTRICULAR RATE: 78 BPM
EOSINOPHILS RELATIVE PERCENT: 0 % (ref 1–4)
GFR AFRICAN AMERICAN: 33 ML/MIN
GFR NON-AFRICAN AMERICAN: 27 ML/MIN
GFR SERPL CREATININE-BSD FRML MDRD: ABNORMAL ML/MIN/{1.73_M2}
GFR SERPL CREATININE-BSD FRML MDRD: ABNORMAL ML/MIN/{1.73_M2}
GLUCOSE BLD-MCNC: 216 MG/DL (ref 70–99)
HCT VFR BLD CALC: 29.8 % (ref 40.7–50.3)
HEMOGLOBIN: 9.4 G/DL (ref 13–17)
IMMATURE GRANULOCYTES: 1 %
LV EF: 36 %
LVEF MODALITY: NORMAL
LYMPHOCYTES # BLD: 4 % (ref 24–43)
MAGNESIUM: 2.1 MG/DL (ref 1.6–2.6)
MCH RBC QN AUTO: 28.9 PG (ref 25.2–33.5)
MCHC RBC AUTO-ENTMCNC: 31.5 G/DL (ref 28.4–34.8)
MCV RBC AUTO: 91.7 FL (ref 82.6–102.9)
MONOCYTES # BLD: 6 % (ref 3–12)
NRBC AUTOMATED: 0 PER 100 WBC
PDW BLD-RTO: 13.9 % (ref 11.8–14.4)
PHOSPHORUS: 4.1 MG/DL (ref 2.5–4.5)
PLATELET # BLD: 224 K/UL (ref 138–453)
PLATELET ESTIMATE: ABNORMAL
PMV BLD AUTO: 11.7 FL (ref 8.1–13.5)
POTASSIUM SERPL-SCNC: 4.2 MMOL/L (ref 3.7–5.3)
RBC # BLD: 3.25 M/UL (ref 4.21–5.77)
RBC # BLD: ABNORMAL 10*6/UL
SEG NEUTROPHILS: 89 % (ref 36–65)
SEGMENTED NEUTROPHILS ABSOLUTE COUNT: 13.71 K/UL (ref 1.5–8.1)
SODIUM BLD-SCNC: 142 MMOL/L (ref 135–144)
TROPONIN INTERP: ABNORMAL
TROPONIN INTERP: ABNORMAL
TROPONIN T: ABNORMAL NG/ML
TROPONIN T: ABNORMAL NG/ML
TROPONIN, HIGH SENSITIVITY: 181 NG/L (ref 0–22)
TROPONIN, HIGH SENSITIVITY: 204 NG/L (ref 0–22)
WBC # BLD: 15.4 K/UL (ref 3.5–11.3)
WBC # BLD: ABNORMAL 10*3/UL

## 2019-12-14 PROCEDURE — 6370000000 HC RX 637 (ALT 250 FOR IP): Performed by: INTERNAL MEDICINE

## 2019-12-14 PROCEDURE — 36415 COLL VENOUS BLD VENIPUNCTURE: CPT

## 2019-12-14 PROCEDURE — 94760 N-INVAS EAR/PLS OXIMETRY 1: CPT

## 2019-12-14 PROCEDURE — 6360000002 HC RX W HCPCS: Performed by: INTERNAL MEDICINE

## 2019-12-14 PROCEDURE — 6360000002 HC RX W HCPCS: Performed by: FAMILY MEDICINE

## 2019-12-14 PROCEDURE — 2580000003 HC RX 258: Performed by: INTERNAL MEDICINE

## 2019-12-14 PROCEDURE — 93010 ELECTROCARDIOGRAM REPORT: CPT | Performed by: INTERNAL MEDICINE

## 2019-12-14 PROCEDURE — 80048 BASIC METABOLIC PNL TOTAL CA: CPT

## 2019-12-14 PROCEDURE — 6370000000 HC RX 637 (ALT 250 FOR IP): Performed by: FAMILY MEDICINE

## 2019-12-14 PROCEDURE — 83735 ASSAY OF MAGNESIUM: CPT

## 2019-12-14 PROCEDURE — 2700000000 HC OXYGEN THERAPY PER DAY

## 2019-12-14 PROCEDURE — 84484 ASSAY OF TROPONIN QUANT: CPT

## 2019-12-14 PROCEDURE — A9500 TC99M SESTAMIBI: HCPCS | Performed by: INTERNAL MEDICINE

## 2019-12-14 PROCEDURE — 93017 CV STRESS TEST TRACING ONLY: CPT

## 2019-12-14 PROCEDURE — 78452 HT MUSCLE IMAGE SPECT MULT: CPT

## 2019-12-14 PROCEDURE — 3430000000 HC RX DIAGNOSTIC RADIOPHARMACEUTICAL: Performed by: INTERNAL MEDICINE

## 2019-12-14 PROCEDURE — 85025 COMPLETE CBC W/AUTO DIFF WBC: CPT

## 2019-12-14 PROCEDURE — 2060000000 HC ICU INTERMEDIATE R&B

## 2019-12-14 PROCEDURE — 2580000003 HC RX 258: Performed by: FAMILY MEDICINE

## 2019-12-14 PROCEDURE — 84100 ASSAY OF PHOSPHORUS: CPT

## 2019-12-14 RX ORDER — SODIUM CHLORIDE 9 MG/ML
500 INJECTION, SOLUTION INTRAVENOUS CONTINUOUS PRN
Status: ACTIVE | OUTPATIENT
Start: 2019-12-14 | End: 2019-12-14

## 2019-12-14 RX ORDER — NITROGLYCERIN 0.4 MG/1
0.4 TABLET SUBLINGUAL EVERY 5 MIN PRN
Status: ACTIVE | OUTPATIENT
Start: 2019-12-14 | End: 2019-12-14

## 2019-12-14 RX ORDER — ATROPINE SULFATE 0.1 MG/ML
0.5 INJECTION INTRAVENOUS EVERY 5 MIN PRN
Status: ACTIVE | OUTPATIENT
Start: 2019-12-14 | End: 2019-12-14

## 2019-12-14 RX ORDER — AMINOPHYLLINE DIHYDRATE 25 MG/ML
50 INJECTION, SOLUTION INTRAVENOUS PRN
Status: ACTIVE | OUTPATIENT
Start: 2019-12-14 | End: 2019-12-14

## 2019-12-14 RX ORDER — SODIUM CHLORIDE 0.9 % (FLUSH) 0.9 %
10 SYRINGE (ML) INJECTION PRN
Status: ACTIVE | OUTPATIENT
Start: 2019-12-14 | End: 2019-12-14

## 2019-12-14 RX ORDER — ALBUTEROL SULFATE 90 UG/1
2 AEROSOL, METERED RESPIRATORY (INHALATION) PRN
Status: ACTIVE | OUTPATIENT
Start: 2019-12-14 | End: 2019-12-14

## 2019-12-14 RX ORDER — METHYLPREDNISOLONE SODIUM SUCCINATE 40 MG/ML
40 INJECTION, POWDER, LYOPHILIZED, FOR SOLUTION INTRAMUSCULAR; INTRAVENOUS EVERY 8 HOURS
Status: DISCONTINUED | OUTPATIENT
Start: 2019-12-14 | End: 2019-12-15 | Stop reason: HOSPADM

## 2019-12-14 RX ORDER — SODIUM CHLORIDE 9 MG/ML
INJECTION, SOLUTION INTRAVENOUS CONTINUOUS
Status: DISCONTINUED | OUTPATIENT
Start: 2019-12-14 | End: 2019-12-15 | Stop reason: HOSPADM

## 2019-12-14 RX ORDER — METOPROLOL TARTRATE 5 MG/5ML
5 INJECTION INTRAVENOUS EVERY 5 MIN PRN
Status: ACTIVE | OUTPATIENT
Start: 2019-12-14 | End: 2019-12-14

## 2019-12-14 RX ADMIN — CEFTRIAXONE SODIUM 1 G: 1 INJECTION, POWDER, FOR SOLUTION INTRAMUSCULAR; INTRAVENOUS at 21:22

## 2019-12-14 RX ADMIN — HEPARIN SODIUM 5000 UNITS: 5000 INJECTION INTRAVENOUS; SUBCUTANEOUS at 21:22

## 2019-12-14 RX ADMIN — REGADENOSON 0.4 MG: 0.08 INJECTION, SOLUTION INTRAVENOUS at 08:28

## 2019-12-14 RX ADMIN — TETRAKIS(2-METHOXYISOBUTYLISOCYANIDE)COPPER(I) TETRAFLUOROBORATE 18.3 MILLICURIE: 1 INJECTION, POWDER, LYOPHILIZED, FOR SOLUTION INTRAVENOUS at 08:30

## 2019-12-14 RX ADMIN — ACETAMINOPHEN 650 MG: 325 TABLET ORAL at 09:49

## 2019-12-14 RX ADMIN — SODIUM BICARBONATE 650 MG: 650 TABLET ORAL at 21:21

## 2019-12-14 RX ADMIN — FAMOTIDINE 20 MG: 20 TABLET ORAL at 21:21

## 2019-12-14 RX ADMIN — ACETAMINOPHEN 650 MG: 325 TABLET ORAL at 05:14

## 2019-12-14 RX ADMIN — SODIUM BICARBONATE 650 MG: 650 TABLET ORAL at 09:49

## 2019-12-14 RX ADMIN — ONDANSETRON 4 MG: 2 INJECTION INTRAMUSCULAR; INTRAVENOUS at 13:24

## 2019-12-14 RX ADMIN — ASPIRIN 81 MG 81 MG: 81 TABLET ORAL at 09:49

## 2019-12-14 RX ADMIN — GUAIFENESIN AND DEXTROMETHORPHAN 10 ML: 100; 10 SYRUP ORAL at 05:13

## 2019-12-14 RX ADMIN — Medication 10 ML: at 08:28

## 2019-12-14 RX ADMIN — SODIUM CHLORIDE: 9 INJECTION, SOLUTION INTRAVENOUS at 14:21

## 2019-12-14 RX ADMIN — TETRAKIS(2-METHOXYISOBUTYLISOCYANIDE)COPPER(I) TETRAFLUOROBORATE 38.5 MILLICURIE: 1 INJECTION, POWDER, LYOPHILIZED, FOR SOLUTION INTRAVENOUS at 11:55

## 2019-12-14 RX ADMIN — METHYLPREDNISOLONE SODIUM SUCCINATE 40 MG: 40 INJECTION, POWDER, FOR SOLUTION INTRAMUSCULAR; INTRAVENOUS at 21:21

## 2019-12-14 ASSESSMENT — PAIN SCALES - GENERAL
PAINLEVEL_OUTOF10: 9
PAINLEVEL_OUTOF10: 5

## 2019-12-15 VITALS
HEART RATE: 92 BPM | RESPIRATION RATE: 18 BRPM | DIASTOLIC BLOOD PRESSURE: 93 MMHG | SYSTOLIC BLOOD PRESSURE: 161 MMHG | TEMPERATURE: 98.1 F | WEIGHT: 179 LBS | HEIGHT: 66 IN | OXYGEN SATURATION: 92 % | BODY MASS INDEX: 28.77 KG/M2

## 2019-12-15 LAB
ABSOLUTE EOS #: 0 K/UL (ref 0–0.4)
ABSOLUTE IMMATURE GRANULOCYTE: 0 K/UL (ref 0–0.3)
ABSOLUTE LYMPH #: 0.4 K/UL (ref 1–4.8)
ABSOLUTE MONO #: 0.2 K/UL (ref 0.2–0.8)
ANION GAP SERPL CALCULATED.3IONS-SCNC: 11 MMOL/L (ref 9–17)
BASOPHILS # BLD: 1 %
BASOPHILS ABSOLUTE: 0.1 K/UL (ref 0–0.2)
BUN BLDV-MCNC: 60 MG/DL (ref 8–23)
BUN/CREAT BLD: 30 (ref 9–20)
CALCIUM SERPL-MCNC: 9.2 MG/DL (ref 8.6–10.4)
CHLORIDE BLD-SCNC: 107 MMOL/L (ref 98–107)
CO2: 22 MMOL/L (ref 20–31)
CREAT SERPL-MCNC: 2 MG/DL (ref 0.7–1.2)
DIFFERENTIAL TYPE: ABNORMAL
EOSINOPHILS RELATIVE PERCENT: 0 % (ref 1–4)
GFR AFRICAN AMERICAN: 40 ML/MIN
GFR NON-AFRICAN AMERICAN: 33 ML/MIN
GFR SERPL CREATININE-BSD FRML MDRD: ABNORMAL ML/MIN/{1.73_M2}
GFR SERPL CREATININE-BSD FRML MDRD: ABNORMAL ML/MIN/{1.73_M2}
GLUCOSE BLD-MCNC: 310 MG/DL (ref 70–99)
HCT VFR BLD CALC: 32.8 % (ref 41–53)
HEMOGLOBIN: 10.3 G/DL (ref 13.5–17.5)
IMMATURE GRANULOCYTES: 0 %
LYMPHOCYTES # BLD: 4 % (ref 24–44)
MAGNESIUM: 2.1 MG/DL (ref 1.6–2.6)
MCH RBC QN AUTO: 28.5 PG (ref 26–34)
MCHC RBC AUTO-ENTMCNC: 31.4 G/DL (ref 31–37)
MCV RBC AUTO: 90.6 FL (ref 80–100)
MONOCYTES # BLD: 2 % (ref 1–7)
NRBC AUTOMATED: ABNORMAL PER 100 WBC
PDW BLD-RTO: 13.6 % (ref 11.5–14.5)
PHOSPHORUS: 3.6 MG/DL (ref 2.5–4.5)
PLATELET # BLD: 233 K/UL (ref 130–400)
PLATELET ESTIMATE: ABNORMAL
PMV BLD AUTO: ABNORMAL FL (ref 6–12)
POTASSIUM SERPL-SCNC: 5.1 MMOL/L (ref 3.7–5.3)
RBC # BLD: 3.62 M/UL (ref 4.5–5.9)
RBC # BLD: ABNORMAL 10*6/UL
SEG NEUTROPHILS: 93 % (ref 36–66)
SEGMENTED NEUTROPHILS ABSOLUTE COUNT: 9.3 K/UL (ref 1.8–7.7)
SODIUM BLD-SCNC: 140 MMOL/L (ref 135–144)
TROPONIN INTERP: ABNORMAL
TROPONIN T: ABNORMAL NG/ML
TROPONIN, HIGH SENSITIVITY: 232 NG/L (ref 0–22)
WBC # BLD: 10 K/UL (ref 3.5–11)
WBC # BLD: ABNORMAL 10*3/UL

## 2019-12-15 PROCEDURE — 84100 ASSAY OF PHOSPHORUS: CPT

## 2019-12-15 PROCEDURE — 85025 COMPLETE CBC W/AUTO DIFF WBC: CPT

## 2019-12-15 PROCEDURE — 6370000000 HC RX 637 (ALT 250 FOR IP): Performed by: INTERNAL MEDICINE

## 2019-12-15 PROCEDURE — 83735 ASSAY OF MAGNESIUM: CPT

## 2019-12-15 PROCEDURE — 6360000002 HC RX W HCPCS: Performed by: FAMILY MEDICINE

## 2019-12-15 PROCEDURE — 6370000000 HC RX 637 (ALT 250 FOR IP): Performed by: UROLOGY

## 2019-12-15 PROCEDURE — 6360000002 HC RX W HCPCS: Performed by: INTERNAL MEDICINE

## 2019-12-15 PROCEDURE — 6370000000 HC RX 637 (ALT 250 FOR IP): Performed by: FAMILY MEDICINE

## 2019-12-15 PROCEDURE — 36415 COLL VENOUS BLD VENIPUNCTURE: CPT

## 2019-12-15 PROCEDURE — 6370000000 HC RX 637 (ALT 250 FOR IP): Performed by: NURSE PRACTITIONER

## 2019-12-15 PROCEDURE — 80048 BASIC METABOLIC PNL TOTAL CA: CPT

## 2019-12-15 PROCEDURE — 84484 ASSAY OF TROPONIN QUANT: CPT

## 2019-12-15 RX ORDER — SODIUM BICARBONATE 650 MG/1
650 TABLET ORAL 2 TIMES DAILY
Qty: 60 TABLET | Refills: 1 | Status: SHIPPED | OUTPATIENT
Start: 2019-12-15

## 2019-12-15 RX ORDER — PSEUDOEPHEDRINE HCL 30 MG
100 TABLET ORAL 2 TIMES DAILY
Qty: 60 CAPSULE | Refills: 0 | Status: SHIPPED | OUTPATIENT
Start: 2019-12-15 | End: 2020-01-22 | Stop reason: ALTCHOICE

## 2019-12-15 RX ORDER — ASPIRIN 81 MG/1
81 TABLET, CHEWABLE ORAL DAILY
Qty: 30 TABLET | Refills: 0 | Status: ON HOLD | OUTPATIENT
Start: 2019-12-16 | End: 2020-01-30 | Stop reason: HOSPADM

## 2019-12-15 RX ORDER — SENNA PLUS 8.6 MG/1
1 TABLET ORAL 2 TIMES DAILY PRN
Qty: 60 TABLET | Refills: 0 | Status: SHIPPED | OUTPATIENT
Start: 2019-12-15 | End: 2020-01-14

## 2019-12-15 RX ORDER — OXYBUTYNIN CHLORIDE 5 MG/1
5 TABLET ORAL 3 TIMES DAILY
Qty: 90 TABLET | Refills: 0 | Status: SHIPPED | OUTPATIENT
Start: 2019-12-15

## 2019-12-15 RX ORDER — SENNA PLUS 8.6 MG/1
1 TABLET ORAL 2 TIMES DAILY PRN
Status: DISCONTINUED | OUTPATIENT
Start: 2019-12-15 | End: 2019-12-15 | Stop reason: HOSPADM

## 2019-12-15 RX ORDER — DOCUSATE SODIUM 100 MG/1
100 CAPSULE, LIQUID FILLED ORAL 2 TIMES DAILY
Status: DISCONTINUED | OUTPATIENT
Start: 2019-12-15 | End: 2019-12-15 | Stop reason: HOSPADM

## 2019-12-15 RX ADMIN — ACETAMINOPHEN 650 MG: 325 TABLET ORAL at 08:33

## 2019-12-15 RX ADMIN — SENNA 8.6 MG: 8.6 TABLET, COATED ORAL at 13:55

## 2019-12-15 RX ADMIN — ASPIRIN 81 MG 81 MG: 81 TABLET ORAL at 08:23

## 2019-12-15 RX ADMIN — METHYLPREDNISOLONE SODIUM SUCCINATE 40 MG: 40 INJECTION, POWDER, FOR SOLUTION INTRAMUSCULAR; INTRAVENOUS at 13:55

## 2019-12-15 RX ADMIN — OXYBUTYNIN CHLORIDE 5 MG: 5 TABLET ORAL at 13:55

## 2019-12-15 RX ADMIN — OXYBUTYNIN CHLORIDE 5 MG: 5 TABLET ORAL at 08:23

## 2019-12-15 RX ADMIN — METOPROLOL TARTRATE 50 MG: 50 TABLET, FILM COATED ORAL at 08:23

## 2019-12-15 RX ADMIN — TAMSULOSIN HYDROCHLORIDE 0.4 MG: 0.4 CAPSULE ORAL at 08:23

## 2019-12-15 RX ADMIN — SODIUM BICARBONATE 650 MG: 650 TABLET ORAL at 08:23

## 2019-12-15 RX ADMIN — DOCUSATE SODIUM 100 MG: 100 CAPSULE, LIQUID FILLED ORAL at 13:55

## 2019-12-15 RX ADMIN — AMLODIPINE BESYLATE 10 MG: 10 TABLET ORAL at 08:23

## 2019-12-15 RX ADMIN — HEPARIN SODIUM 5000 UNITS: 5000 INJECTION INTRAVENOUS; SUBCUTANEOUS at 06:07

## 2019-12-15 RX ADMIN — HYDROMORPHONE HYDROCHLORIDE 1 MG: 1 INJECTION, SOLUTION INTRAMUSCULAR; INTRAVENOUS; SUBCUTANEOUS at 17:24

## 2019-12-15 RX ADMIN — METHYLPREDNISOLONE SODIUM SUCCINATE 40 MG: 40 INJECTION, POWDER, FOR SOLUTION INTRAMUSCULAR; INTRAVENOUS at 06:07

## 2019-12-15 RX ADMIN — HEPARIN SODIUM 5000 UNITS: 5000 INJECTION INTRAVENOUS; SUBCUTANEOUS at 13:55

## 2019-12-15 ASSESSMENT — PAIN DESCRIPTION - LOCATION
LOCATION: HEAD
LOCATION: HEAD
LOCATION: ABDOMEN

## 2019-12-15 ASSESSMENT — PAIN DESCRIPTION - FREQUENCY
FREQUENCY: INTERMITTENT
FREQUENCY: INTERMITTENT

## 2019-12-15 ASSESSMENT — PAIN DESCRIPTION - DESCRIPTORS
DESCRIPTORS: ACHING
DESCRIPTORS: HEADACHE

## 2019-12-15 ASSESSMENT — PAIN DESCRIPTION - PROGRESSION
CLINICAL_PROGRESSION: NOT CHANGED
CLINICAL_PROGRESSION: NOT CHANGED

## 2019-12-15 ASSESSMENT — PAIN SCALES - GENERAL
PAINLEVEL_OUTOF10: 8
PAINLEVEL_OUTOF10: 8
PAINLEVEL_OUTOF10: 0
PAINLEVEL_OUTOF10: 0
PAINLEVEL_OUTOF10: 9

## 2019-12-15 ASSESSMENT — PAIN - FUNCTIONAL ASSESSMENT
PAIN_FUNCTIONAL_ASSESSMENT: PREVENTS OR INTERFERES SOME ACTIVE ACTIVITIES AND ADLS
PAIN_FUNCTIONAL_ASSESSMENT: PREVENTS OR INTERFERES SOME ACTIVE ACTIVITIES AND ADLS

## 2019-12-15 ASSESSMENT — PAIN DESCRIPTION - PAIN TYPE
TYPE: ACUTE PAIN

## 2019-12-15 ASSESSMENT — PAIN DESCRIPTION - ONSET
ONSET: GRADUAL
ONSET: ON-GOING

## 2020-01-04 PROBLEM — K59.00 CONSTIPATION: Status: ACTIVE | Noted: 2020-01-04

## 2020-01-04 PROBLEM — R94.39 ABNORMAL CARDIOVASCULAR STRESS TEST: Status: ACTIVE | Noted: 2020-01-04

## 2020-01-04 PROBLEM — N18.30 STAGE 3 CHRONIC KIDNEY DISEASE (HCC): Status: ACTIVE | Noted: 2020-01-04

## 2020-01-04 PROBLEM — J44.1 COPD WITH ACUTE EXACERBATION (HCC): Status: ACTIVE | Noted: 2020-01-04

## 2020-01-22 ENCOUNTER — APPOINTMENT (OUTPATIENT)
Dept: GENERAL RADIOLOGY | Age: 70
DRG: 234 | End: 2020-01-22
Payer: MEDICARE

## 2020-01-22 ENCOUNTER — HOSPITAL ENCOUNTER (INPATIENT)
Age: 70
LOS: 6 days | Discharge: NURSING, REHAB OR LONG-TERM CARE | DRG: 234 | End: 2020-01-30
Attending: EMERGENCY MEDICINE | Admitting: FAMILY MEDICINE
Payer: MEDICARE

## 2020-01-22 PROBLEM — R07.9 CHEST PAIN: Status: ACTIVE | Noted: 2020-01-22

## 2020-01-22 LAB
ABSOLUTE EOS #: 0.05 K/UL (ref 0–0.44)
ABSOLUTE IMMATURE GRANULOCYTE: 0.02 K/UL (ref 0–0.3)
ABSOLUTE LYMPH #: 1.58 K/UL (ref 1.1–3.7)
ABSOLUTE MONO #: 0.62 K/UL (ref 0.1–1.2)
ANION GAP SERPL CALCULATED.3IONS-SCNC: 11 MMOL/L (ref 9–17)
BASOPHILS # BLD: 1 % (ref 0–2)
BASOPHILS ABSOLUTE: 0.04 K/UL (ref 0–0.2)
BNP INTERPRETATION: ABNORMAL
BUN BLDV-MCNC: 31 MG/DL (ref 8–23)
BUN/CREAT BLD: 14 (ref 9–20)
CALCIUM SERPL-MCNC: 9.2 MG/DL (ref 8.6–10.4)
CHLORIDE BLD-SCNC: 104 MMOL/L (ref 98–107)
CO2: 23 MMOL/L (ref 20–31)
CREAT SERPL-MCNC: 2.25 MG/DL (ref 0.7–1.2)
DIFFERENTIAL TYPE: ABNORMAL
EOSINOPHILS RELATIVE PERCENT: 1 % (ref 1–4)
GFR AFRICAN AMERICAN: 35 ML/MIN
GFR NON-AFRICAN AMERICAN: 29 ML/MIN
GFR SERPL CREATININE-BSD FRML MDRD: ABNORMAL ML/MIN/{1.73_M2}
GFR SERPL CREATININE-BSD FRML MDRD: ABNORMAL ML/MIN/{1.73_M2}
GLUCOSE BLD-MCNC: 212 MG/DL (ref 70–99)
HCT VFR BLD CALC: 33 % (ref 40.7–50.3)
HEMOGLOBIN: 10.2 G/DL (ref 13–17)
IMMATURE GRANULOCYTES: 0 %
LYMPHOCYTES # BLD: 21 % (ref 24–43)
MCH RBC QN AUTO: 28.7 PG (ref 25.2–33.5)
MCHC RBC AUTO-ENTMCNC: 30.9 G/DL (ref 28–38)
MCV RBC AUTO: 93 FL (ref 82.6–102.9)
MONOCYTES # BLD: 8 % (ref 3–12)
MYOGLOBIN: 128 NG/ML (ref 28–72)
NRBC AUTOMATED: ABNORMAL PER 100 WBC
PDW BLD-RTO: 13.9 % (ref 11.8–14.4)
PLATELET # BLD: 236 K/UL (ref 138–453)
PLATELET ESTIMATE: ABNORMAL
PMV BLD AUTO: 10.2 FL (ref 8.1–13.5)
POTASSIUM SERPL-SCNC: 4.5 MMOL/L (ref 3.7–5.3)
PRO-BNP: ABNORMAL PG/ML
RBC # BLD: 3.55 M/UL (ref 4.21–5.77)
RBC # BLD: ABNORMAL 10*6/UL
SEG NEUTROPHILS: 69 % (ref 36–65)
SEGMENTED NEUTROPHILS ABSOLUTE COUNT: 5.11 K/UL (ref 1.5–8.1)
SODIUM BLD-SCNC: 138 MMOL/L (ref 135–144)
TROPONIN INTERP: ABNORMAL
TROPONIN T: ABNORMAL NG/ML
TROPONIN, HIGH SENSITIVITY: 101 NG/L (ref 0–22)
TROPONIN, HIGH SENSITIVITY: 104 NG/L (ref 0–22)
TROPONIN, HIGH SENSITIVITY: 108 NG/L (ref 0–22)
TROPONIN, HIGH SENSITIVITY: 112 NG/L (ref 0–22)
WBC # BLD: 7.4 K/UL (ref 3.5–11.3)
WBC # BLD: ABNORMAL 10*3/UL

## 2020-01-22 PROCEDURE — G0378 HOSPITAL OBSERVATION PER HR: HCPCS

## 2020-01-22 PROCEDURE — 99285 EMERGENCY DEPT VISIT HI MDM: CPT

## 2020-01-22 PROCEDURE — 83874 ASSAY OF MYOGLOBIN: CPT

## 2020-01-22 PROCEDURE — 6370000000 HC RX 637 (ALT 250 FOR IP): Performed by: NURSE PRACTITIONER

## 2020-01-22 PROCEDURE — 93005 ELECTROCARDIOGRAM TRACING: CPT | Performed by: EMERGENCY MEDICINE

## 2020-01-22 PROCEDURE — 96374 THER/PROPH/DIAG INJ IV PUSH: CPT

## 2020-01-22 PROCEDURE — 71045 X-RAY EXAM CHEST 1 VIEW: CPT

## 2020-01-22 PROCEDURE — 80048 BASIC METABOLIC PNL TOTAL CA: CPT

## 2020-01-22 PROCEDURE — 85025 COMPLETE CBC W/AUTO DIFF WBC: CPT

## 2020-01-22 PROCEDURE — 84484 ASSAY OF TROPONIN QUANT: CPT

## 2020-01-22 PROCEDURE — 6360000002 HC RX W HCPCS: Performed by: NURSE PRACTITIONER

## 2020-01-22 PROCEDURE — 2580000003 HC RX 258: Performed by: FAMILY MEDICINE

## 2020-01-22 PROCEDURE — 36415 COLL VENOUS BLD VENIPUNCTURE: CPT

## 2020-01-22 PROCEDURE — 6370000000 HC RX 637 (ALT 250 FOR IP): Performed by: FAMILY MEDICINE

## 2020-01-22 PROCEDURE — 83880 ASSAY OF NATRIURETIC PEPTIDE: CPT

## 2020-01-22 RX ORDER — MORPHINE SULFATE 15 MG/1
30 TABLET, FILM COATED, EXTENDED RELEASE ORAL DAILY
Status: DISCONTINUED | OUTPATIENT
Start: 2020-01-22 | End: 2020-01-26

## 2020-01-22 RX ORDER — NITROGLYCERIN 0.4 MG/1
0.4 TABLET SUBLINGUAL EVERY 5 MIN PRN
Status: DISCONTINUED | OUTPATIENT
Start: 2020-01-22 | End: 2020-01-25

## 2020-01-22 RX ORDER — METOPROLOL TARTRATE 50 MG/1
50 TABLET, FILM COATED ORAL 2 TIMES DAILY
Status: DISCONTINUED | OUTPATIENT
Start: 2020-01-22 | End: 2020-01-25

## 2020-01-22 RX ORDER — SODIUM CHLORIDE 0.9 % (FLUSH) 0.9 %
10 SYRINGE (ML) INJECTION PRN
Status: DISCONTINUED | OUTPATIENT
Start: 2020-01-22 | End: 2020-01-22 | Stop reason: SDUPTHER

## 2020-01-22 RX ORDER — FUROSEMIDE 10 MG/ML
40 INJECTION INTRAMUSCULAR; INTRAVENOUS ONCE
Status: COMPLETED | OUTPATIENT
Start: 2020-01-22 | End: 2020-01-22

## 2020-01-22 RX ORDER — ASPIRIN 81 MG/1
324 TABLET, CHEWABLE ORAL ONCE
Status: COMPLETED | OUTPATIENT
Start: 2020-01-22 | End: 2020-01-22

## 2020-01-22 RX ORDER — OXYBUTYNIN CHLORIDE 5 MG/1
5 TABLET ORAL 3 TIMES DAILY
Status: DISCONTINUED | OUTPATIENT
Start: 2020-01-22 | End: 2020-01-30 | Stop reason: HOSPADM

## 2020-01-22 RX ORDER — ATORVASTATIN CALCIUM 20 MG/1
20 TABLET, FILM COATED ORAL NIGHTLY
Status: DISCONTINUED | OUTPATIENT
Start: 2020-01-22 | End: 2020-01-25

## 2020-01-22 RX ORDER — MORPHINE SULFATE 15 MG/1
15 TABLET, FILM COATED, EXTENDED RELEASE ORAL EVERY 12 HOURS SCHEDULED
Status: DISCONTINUED | OUTPATIENT
Start: 2020-01-22 | End: 2020-01-22

## 2020-01-22 RX ORDER — ONDANSETRON 2 MG/ML
4 INJECTION INTRAMUSCULAR; INTRAVENOUS EVERY 6 HOURS PRN
Status: DISCONTINUED | OUTPATIENT
Start: 2020-01-22 | End: 2020-01-25 | Stop reason: SDUPTHER

## 2020-01-22 RX ORDER — AMLODIPINE BESYLATE 10 MG/1
10 TABLET ORAL DAILY
Status: DISCONTINUED | OUTPATIENT
Start: 2020-01-23 | End: 2020-01-25

## 2020-01-22 RX ORDER — ONDANSETRON 4 MG/1
4 TABLET, ORALLY DISINTEGRATING ORAL EVERY 6 HOURS PRN
Status: DISCONTINUED | OUTPATIENT
Start: 2020-01-22 | End: 2020-01-25 | Stop reason: SDUPTHER

## 2020-01-22 RX ORDER — SODIUM BICARBONATE 650 MG/1
650 TABLET ORAL 2 TIMES DAILY
Status: DISCONTINUED | OUTPATIENT
Start: 2020-01-22 | End: 2020-01-26

## 2020-01-22 RX ORDER — SODIUM CHLORIDE 0.9 % (FLUSH) 0.9 %
10 SYRINGE (ML) INJECTION EVERY 12 HOURS SCHEDULED
Status: DISCONTINUED | OUTPATIENT
Start: 2020-01-22 | End: 2020-01-23 | Stop reason: SDUPTHER

## 2020-01-22 RX ORDER — TAMSULOSIN HYDROCHLORIDE 0.4 MG/1
0.4 CAPSULE ORAL DAILY
Status: DISCONTINUED | OUTPATIENT
Start: 2020-01-23 | End: 2020-01-30 | Stop reason: HOSPADM

## 2020-01-22 RX ORDER — SODIUM CHLORIDE 0.9 % (FLUSH) 0.9 %
10 SYRINGE (ML) INJECTION PRN
Status: DISCONTINUED | OUTPATIENT
Start: 2020-01-22 | End: 2020-01-23 | Stop reason: SDUPTHER

## 2020-01-22 RX ORDER — ONDANSETRON 2 MG/ML
4 INJECTION INTRAMUSCULAR; INTRAVENOUS EVERY 6 HOURS PRN
Status: DISCONTINUED | OUTPATIENT
Start: 2020-01-22 | End: 2020-01-22 | Stop reason: SDUPTHER

## 2020-01-22 RX ORDER — IPRATROPIUM BROMIDE AND ALBUTEROL SULFATE 2.5; .5 MG/3ML; MG/3ML
1 SOLUTION RESPIRATORY (INHALATION) EVERY 4 HOURS PRN
Status: DISCONTINUED | OUTPATIENT
Start: 2020-01-22 | End: 2020-01-30 | Stop reason: HOSPADM

## 2020-01-22 RX ORDER — ACETAMINOPHEN 325 MG/1
650 TABLET ORAL EVERY 4 HOURS PRN
Status: DISCONTINUED | OUTPATIENT
Start: 2020-01-22 | End: 2020-01-23 | Stop reason: SDUPTHER

## 2020-01-22 RX ORDER — HEPARIN SODIUM 5000 [USP'U]/ML
5000 INJECTION, SOLUTION INTRAVENOUS; SUBCUTANEOUS EVERY 8 HOURS SCHEDULED
Status: DISCONTINUED | OUTPATIENT
Start: 2020-01-22 | End: 2020-01-25

## 2020-01-22 RX ORDER — SODIUM CHLORIDE 0.9 % (FLUSH) 0.9 %
10 SYRINGE (ML) INJECTION EVERY 12 HOURS SCHEDULED
Status: DISCONTINUED | OUTPATIENT
Start: 2020-01-22 | End: 2020-01-22 | Stop reason: SDUPTHER

## 2020-01-22 RX ORDER — MORPHINE SULFATE 15 MG/1
30 TABLET, FILM COATED, EXTENDED RELEASE ORAL DAILY PRN
Status: DISCONTINUED | OUTPATIENT
Start: 2020-01-22 | End: 2020-01-22

## 2020-01-22 RX ADMIN — MORPHINE SULFATE 30 MG: 15 TABLET, FILM COATED, EXTENDED RELEASE ORAL at 22:32

## 2020-01-22 RX ADMIN — SODIUM CHLORIDE, PRESERVATIVE FREE 10 ML: 5 INJECTION INTRAVENOUS at 21:30

## 2020-01-22 RX ADMIN — ASPIRIN 81 MG 324 MG: 81 TABLET ORAL at 15:59

## 2020-01-22 RX ADMIN — FUROSEMIDE 40 MG: 10 INJECTION, SOLUTION INTRAMUSCULAR; INTRAVENOUS at 18:13

## 2020-01-22 ASSESSMENT — ENCOUNTER SYMPTOMS
NAUSEA: 0
ABDOMINAL PAIN: 0
COLOR CHANGE: 0
SORE THROAT: 0
VOMITING: 0
SHORTNESS OF BREATH: 0
BACK PAIN: 0
DIARRHEA: 0

## 2020-01-22 ASSESSMENT — PAIN SCALES - GENERAL
PAINLEVEL_OUTOF10: 10
PAINLEVEL_OUTOF10: 6
PAINLEVEL_OUTOF10: 10

## 2020-01-22 ASSESSMENT — PAIN DESCRIPTION - PAIN TYPE: TYPE: ACUTE PAIN

## 2020-01-22 NOTE — ED PROVIDER NOTES
[address] [place] [address] ED  EMERGENCY DEPARTMENT ENCOUNTER   ATTENDING ATTESTATION     Pt Name: Eyal Gallegos  MRN: [id number]  [place] 1950  Date of evaluation: 1/22/20       Eyal Gallegos is a 77 y.o. male who presents with Chest Pain      MDM:     Patient's EKG shows sinus rhythm rate 99, AK, QRS and unremarkable, QTC prolonged. Patient has normal axis, no ST elevations, he has abnormalities in the T wave in V2 and V3. Vitals:   Vitals:    01/22/20 1500 01/22/20 1502   BP: 122/64    Pulse: 98    Resp: 14    Temp: 98.2 °F (36.8 °C)    SpO2: 96%    Weight:  166 lb 3 oz (75.4 kg)   Height:  5' 7\" (1.702 m)         I personally evaluated and examined the patient in conjunction with the Midlevel provider and agree with the assessment, treatment plan, and disposition of the patient as recorded by the resident. I performed a history and physical examination of the patient and discussed management with the midlevel. I reviewed the midlevels note and agree with the documented findings and plan of care. Any areas of disagreement are noted on the chart. I was personally present for the key portions of any procedures. I have documented in the chart those procedures where I was not present during the key portions. I have personally reviewed all images and agree with the midlevel's interpretation. I have reviewed the emergency nurses triage note. I agree with the chief complaint, past medical history, past surgical history, allergies, medications, social and family history as documented unless otherwise noted.     Ruchi Durham MD  Attending Emergency  Physician                  Felipe Avila MD  01/22/20 9473

## 2020-01-22 NOTE — DISCHARGE INSTR - COC
None    Nursing Mobility/ADLs:  Walking   Assisted  Transfer  Independent  Bathing  Independent  Dressing  Assisted  Toileting  Independent  Feeding  Independent  Med Admin  Assisted  Med Delivery   whole    Wound Care Documentation and Therapy:        Elimination:  Continence:   · Bowel: Yes  · Bladder: Yes  Urinary Catheter: None   Colostomy/Ileostomy/Ileal Conduit: No       Date of Last BM: 1/30/2020  No intake or output data in the 24 hours ending 01/22/20 1748  No intake/output data recorded. Safety Concerns: At Risk for Falls    Impairments/Disabilities:      None    Nutrition Therapy:  Current Nutrition Therapy:   - Oral Diet:  Renal    Routes of Feeding: Oral  Liquids: No Restrictions  Daily Fluid Restriction: no  Last Modified Barium Swallow with Video (Video Swallowing Test): not done    Treatments at the Time of Hospital Discharge:   Respiratory Treatments: see MAR  Oxygen Therapy:  is not on home oxygen therapy.   Ventilator:    - No ventilator support    Rehab Therapies: Physical Therapy  Weight Bearing Status/Restrictions: No weight bearing restirctions  Other Medical Equipment (for information only, NOT a DME order):  walker  Other Treatments:     Patient's personal belongings (please select all that are sent with patient):  None    RN SIGNATURE:  Electronically signed by Nella Montero RN on 1/30/20 at 10:12 AM    CASE MANAGEMENT/SOCIAL WORK SECTION    Inpatient Status Date: ***    Readmission Risk Assessment Score:  Readmission Risk              Risk of Unplanned Readmission:        0           Discharging to Facility/ Agency   · Name: [facility]  · Address:  · Phone:[phone]  · Fax:[phone]    Dialysis Facility (if applicable)   · Name:  · Address:  · Dialysis Schedule:  · Phone:  · Fax:    / signature: Electronically signed by MERLYN Leigh on 1/28/20 at 3:28 PM    PHYSICIAN SECTION    Prognosis: Good    Condition at Discharge: Stable    Rehab Potential (if transferring to Rehab): Good    Recommended Labs or Other Treatments After Discharge:     Physician Certification: I certify the above information and transfer of Aleck Gaucher  is necessary for the continuing treatment of the diagnosis listed and that he requires [facility] for less 30 days. Diagnosis:    Skilled Nursing per hospital recommendation ( Monitor Vitals,pain, Mental status, daily weight Blood sugar monitoring TIDAC.) Call MD if Blood sugar<60 or > 350,Fall precaution, wound care martinez catheter removal as per Nursing home attending)  Skilled OT  Physical Therapy  Daily Labs: cbc,bmp q weekly  Monitor INR Daily if pt on coumadin   Coumadin management per SNF admitting physician unless otherwise specified. Oxygen 2L/minute   Blood sugar accucheck TIDAC  Daily Pulse oxymetry  Continue CPAP/BIPAP if pt wearing at home. Catheter/drain care per surgery  If pt on Tube feeding- please continue per hospital orders.     Update Admission H&P: No change in H&P    PHYSICIAN SIGNATURE:  Electronically signed by Ravindra Keith MD on 1/30/2020 @ 8:00 AM

## 2020-01-22 NOTE — ED NOTES
Pt presents to er with c/o chest pain. Pt states he has had pain intermittently since leaving the hospital in December. Pt was admitted to hospital for obstructing kidney stone in December and was found to have elevated troponin and abnormal stress test. Pt was released and was to follow up with cardiology out-pt. Pt states he was at [facility] today scheduling all of his follow up appointments when chest pain started. Pt states he was told per [facility] to come to ed for evaluation. Pt states he has been not feeling well since leaving hospital. Pt states he does get sob intermittently with chest pain. Pt states he feels cold all the time. Pt denies recent fever or chills. Pt a&ox3. Skin pale, warm, and dry. resp non-labored.       Josse Macdonald RN  01/22/20 6546

## 2020-01-22 NOTE — ED PROVIDER NOTES
[address] ED  eMERGENCY dEPARTMENT eNCOUnter      Pt Name: Jez Chadwick  MRN: [id number]  [place] 1950  Date of evaluation: 1/22/2020  Provider: KEVIN Shrestha CNP    CHIEF COMPLAINT       Chief Complaint   Patient presents with    Chest Pain         HISTORY OF PRESENT ILLNESS  (Location/Symptom, Timing/Onset, Context/Setting, Quality, Duration, Modifying Factors, Severity.)   Jez Chadwick is a 77 y.o. male who presents to the emergency department via private auto for left to mid chest pain. Intermittent for over a month. He was admitted for a kidney stone at the beginning of December, 2019. On 12-9-2019 he had a stress test per Dr. Florina Burgess which was abnormal. The pain occurs daily. It lasts for 15 minutes to an hour. Denies fever, chills, SOB. He has a cough. Rates his pain 6/10 at this time. He has seen Dr. Jill Sarmiento in the past.       Nursing Notes were reviewed. ALLERGIES     Iodinated diagnostic agents    CURRENT MEDICATIONS       Previous Medications    ALBUTEROL-IPRATROPIUM (COMBIVENT RESPIMAT)  MCG/ACT AERS INHALER    Inhale 1 puff into the lungs every 6 hours    AMLODIPINE (NORVASC) 10 MG TABLET    Take 1 tablet by mouth daily    ASPIRIN 81 MG CHEWABLE TABLET    Take 1 tablet by mouth daily    ATORVASTATIN (LIPITOR) 20 MG TABLET    Take 1 tablet by mouth nightly    METOPROLOL TARTRATE (LOPRESSOR) 50 MG TABLET    Take 1 tablet by mouth 2 times daily    MORPHINE (MS CONTIN) 15 MG EXTENDED RELEASE TABLET    Take 15 mg by mouth 2 times daily as needed for Pain.      OXYBUTYNIN (DITROPAN) 5 MG TABLET    Take 1 tablet by mouth 3 times daily    PSYLLIUM (KONSYL) 28.3 % PACK    Take 1 packet by mouth daily    SODIUM BICARBONATE 650 MG TABLET    Take 1 tablet by mouth 2 times daily    TAMSULOSIN (FLOMAX) 0.4 MG CAPSULE    Take 1 capsule by mouth daily       PAST MEDICAL HISTORY         Diagnosis Date    Back pain     on 12/6/19 pt states is presently in pain mgmnt    today. He had an abnormal stress test 12-9-2020. He did not follow up outpatient. He will be admitted for further evaluation and treatment. I spoke with Dr. Avril Rosas. CONSULTS:  IP CONSULT TO INTERNAL MEDICINE        FINAL IMPRESSION      1. Chest pain, unspecified type            Problem List  Patient Active Problem List   Diagnosis Code    YOSHI (acute kidney injury) ([facility] N17.9    COPD with acute exacerbation ([facility] J44.1    Abnormal cardiovascular stress test R94.39    Constipation K59.00    Stage 3 chronic kidney disease ([facility] N18.3         DISPOSITION/PLAN   DISPOSITION  ADMIT      PATIENT REFERRED TO:   No follow-up provider specified.     DISCHARGE MEDICATIONS:     New Prescriptions    No medications on file           (Please note that portions of this note were completed with a voice recognition program.  Efforts were made to edit the dictations but occasionally words are mis-transcribed.)    KEVIN Shrestha - Nam 23, KEVIN - NIXON  01/22/20 8055

## 2020-01-23 ENCOUNTER — APPOINTMENT (OUTPATIENT)
Dept: CARDIAC CATH/INVASIVE PROCEDURES | Age: 70
DRG: 234 | End: 2020-01-23
Payer: MEDICARE

## 2020-01-23 LAB
ALBUMIN SERPL-MCNC: 3.3 G/DL (ref 3.5–5.2)
ALBUMIN/GLOBULIN RATIO: ABNORMAL (ref 1–2.5)
ALP BLD-CCNC: 49 U/L (ref 40–129)
ALT SERPL-CCNC: 7 U/L (ref 5–41)
ANION GAP SERPL CALCULATED.3IONS-SCNC: 9 MMOL/L (ref 9–17)
AST SERPL-CCNC: 13 U/L
BILIRUB SERPL-MCNC: 0.31 MG/DL (ref 0.3–1.2)
BUN BLDV-MCNC: 33 MG/DL (ref 8–23)
BUN/CREAT BLD: 14 (ref 9–20)
CALCIUM SERPL-MCNC: 9.2 MG/DL (ref 8.6–10.4)
CHLORIDE BLD-SCNC: 107 MMOL/L (ref 98–107)
CHOLESTEROL/HDL RATIO: 3.5
CHOLESTEROL: 177 MG/DL
CO2: 25 MMOL/L (ref 20–31)
CREAT SERPL-MCNC: 2.33 MG/DL (ref 0.7–1.2)
EKG ATRIAL RATE: 89 BPM
EKG ATRIAL RATE: 99 BPM
EKG P AXIS: 60 DEGREES
EKG P AXIS: 63 DEGREES
EKG P-R INTERVAL: 152 MS
EKG P-R INTERVAL: 170 MS
EKG Q-T INTERVAL: 398 MS
EKG Q-T INTERVAL: 422 MS
EKG QRS DURATION: 100 MS
EKG QRS DURATION: 92 MS
EKG QTC CALCULATION (BAZETT): 484 MS
EKG QTC CALCULATION (BAZETT): 541 MS
EKG R AXIS: 58 DEGREES
EKG R AXIS: 70 DEGREES
EKG T AXIS: -143 DEGREES
EKG T AXIS: -169 DEGREES
EKG VENTRICULAR RATE: 89 BPM
EKG VENTRICULAR RATE: 99 BPM
GFR AFRICAN AMERICAN: 34 ML/MIN
GFR NON-AFRICAN AMERICAN: 28 ML/MIN
GFR SERPL CREATININE-BSD FRML MDRD: ABNORMAL ML/MIN/{1.73_M2}
GFR SERPL CREATININE-BSD FRML MDRD: ABNORMAL ML/MIN/{1.73_M2}
GLUCOSE BLD-MCNC: 106 MG/DL (ref 70–99)
HCT VFR BLD CALC: 32.2 % (ref 40.7–50.3)
HDLC SERPL-MCNC: 51 MG/DL
HEMOGLOBIN: 10.1 G/DL (ref 13–17)
LDL CHOLESTEROL: 108 MG/DL (ref 0–130)
MAGNESIUM: 1.7 MG/DL (ref 1.6–2.6)
MCH RBC QN AUTO: 28.9 PG (ref 25.2–33.5)
MCHC RBC AUTO-ENTMCNC: 31.4 G/DL (ref 28.4–34.8)
MCV RBC AUTO: 92 FL (ref 82.6–102.9)
NRBC AUTOMATED: 0 PER 100 WBC
PDW BLD-RTO: 13.9 % (ref 11.8–14.4)
PLATELET # BLD: 207 K/UL (ref 138–453)
PMV BLD AUTO: 10 FL (ref 8.1–13.5)
POTASSIUM SERPL-SCNC: 4.7 MMOL/L (ref 3.7–5.3)
RBC # BLD: 3.5 M/UL (ref 4.21–5.77)
SODIUM BLD-SCNC: 141 MMOL/L (ref 135–144)
TOTAL PROTEIN: 6.3 G/DL (ref 6.4–8.3)
TRIGL SERPL-MCNC: 88 MG/DL
TROPONIN INTERP: ABNORMAL
TROPONIN T: ABNORMAL NG/ML
TROPONIN, HIGH SENSITIVITY: 101 NG/L (ref 0–22)
VLDLC SERPL CALC-MCNC: NORMAL MG/DL (ref 1–30)
WBC # BLD: 7 K/UL (ref 3.5–11.3)

## 2020-01-23 PROCEDURE — G0378 HOSPITAL OBSERVATION PER HR: HCPCS

## 2020-01-23 PROCEDURE — 80061 LIPID PANEL: CPT

## 2020-01-23 PROCEDURE — 4A023N7 MEASUREMENT OF CARDIAC SAMPLING AND PRESSURE, LEFT HEART, PERCUTANEOUS APPROACH: ICD-10-PCS | Performed by: INTERNAL MEDICINE

## 2020-01-23 PROCEDURE — 2580000003 HC RX 258: Performed by: FAMILY MEDICINE

## 2020-01-23 PROCEDURE — 36415 COLL VENOUS BLD VENIPUNCTURE: CPT

## 2020-01-23 PROCEDURE — 93005 ELECTROCARDIOGRAM TRACING: CPT | Performed by: FAMILY MEDICINE

## 2020-01-23 PROCEDURE — 85027 COMPLETE CBC AUTOMATED: CPT

## 2020-01-23 PROCEDURE — 6370000000 HC RX 637 (ALT 250 FOR IP): Performed by: FAMILY MEDICINE

## 2020-01-23 PROCEDURE — 96375 TX/PRO/DX INJ NEW DRUG ADDON: CPT

## 2020-01-23 PROCEDURE — 93970 EXTREMITY STUDY: CPT

## 2020-01-23 PROCEDURE — 93458 L HRT ARTERY/VENTRICLE ANGIO: CPT | Performed by: INTERNAL MEDICINE

## 2020-01-23 PROCEDURE — 6360000004 HC RX CONTRAST MEDICATION

## 2020-01-23 PROCEDURE — 96372 THER/PROPH/DIAG INJ SC/IM: CPT

## 2020-01-23 PROCEDURE — 83735 ASSAY OF MAGNESIUM: CPT

## 2020-01-23 PROCEDURE — 6360000002 HC RX W HCPCS: Performed by: INTERNAL MEDICINE

## 2020-01-23 PROCEDURE — 6360000002 HC RX W HCPCS

## 2020-01-23 PROCEDURE — 84484 ASSAY OF TROPONIN QUANT: CPT

## 2020-01-23 PROCEDURE — 2500000003 HC RX 250 WO HCPCS

## 2020-01-23 PROCEDURE — 80053 COMPREHEN METABOLIC PANEL: CPT

## 2020-01-23 PROCEDURE — B2111ZZ FLUOROSCOPY OF MULTIPLE CORONARY ARTERIES USING LOW OSMOLAR CONTRAST: ICD-10-PCS | Performed by: INTERNAL MEDICINE

## 2020-01-23 PROCEDURE — 93880 EXTRACRANIAL BILAT STUDY: CPT

## 2020-01-23 PROCEDURE — 2580000003 HC RX 258: Performed by: INTERNAL MEDICINE

## 2020-01-23 PROCEDURE — 6370000000 HC RX 637 (ALT 250 FOR IP): Performed by: INTERNAL MEDICINE

## 2020-01-23 RX ORDER — ACETAMINOPHEN 325 MG/1
650 TABLET ORAL EVERY 4 HOURS PRN
Status: DISCONTINUED | OUTPATIENT
Start: 2020-01-23 | End: 2020-01-30 | Stop reason: HOSPADM

## 2020-01-23 RX ORDER — FENTANYL CITRATE 50 UG/ML
25 INJECTION, SOLUTION INTRAMUSCULAR; INTRAVENOUS
Status: ACTIVE | OUTPATIENT
Start: 2020-01-23 | End: 2020-01-23

## 2020-01-23 RX ORDER — SENNA PLUS 8.6 MG/1
1 TABLET ORAL 2 TIMES DAILY
Status: DISCONTINUED | OUTPATIENT
Start: 2020-01-23 | End: 2020-01-30 | Stop reason: HOSPADM

## 2020-01-23 RX ORDER — SODIUM CHLORIDE 0.9 % (FLUSH) 0.9 %
10 SYRINGE (ML) INJECTION PRN
Status: DISCONTINUED | OUTPATIENT
Start: 2020-01-23 | End: 2020-01-25 | Stop reason: SDUPTHER

## 2020-01-23 RX ORDER — SODIUM CHLORIDE 0.9 % (FLUSH) 0.9 %
10 SYRINGE (ML) INJECTION EVERY 12 HOURS SCHEDULED
Status: DISCONTINUED | OUTPATIENT
Start: 2020-01-23 | End: 2020-01-25 | Stop reason: SDUPTHER

## 2020-01-23 RX ORDER — DIPHENHYDRAMINE HYDROCHLORIDE 50 MG/ML
50 INJECTION INTRAMUSCULAR; INTRAVENOUS ONCE
Status: COMPLETED | OUTPATIENT
Start: 2020-01-23 | End: 2020-01-23

## 2020-01-23 RX ORDER — METHYLPREDNISOLONE SODIUM SUCCINATE 125 MG/2ML
125 INJECTION, POWDER, LYOPHILIZED, FOR SOLUTION INTRAMUSCULAR; INTRAVENOUS ONCE
Status: COMPLETED | OUTPATIENT
Start: 2020-01-23 | End: 2020-01-23

## 2020-01-23 RX ORDER — DIPHENHYDRAMINE HYDROCHLORIDE 50 MG/ML
INJECTION INTRAMUSCULAR; INTRAVENOUS
Status: DISPENSED
Start: 2020-01-23 | End: 2020-01-23

## 2020-01-23 RX ORDER — SODIUM CHLORIDE 9 MG/ML
INJECTION, SOLUTION INTRAVENOUS CONTINUOUS
Status: DISCONTINUED | OUTPATIENT
Start: 2020-01-23 | End: 2020-01-23

## 2020-01-23 RX ORDER — ONDANSETRON 2 MG/ML
4 INJECTION INTRAMUSCULAR; INTRAVENOUS EVERY 6 HOURS PRN
Status: DISCONTINUED | OUTPATIENT
Start: 2020-01-23 | End: 2020-01-23 | Stop reason: SDUPTHER

## 2020-01-23 RX ORDER — METHYLPREDNISOLONE SODIUM SUCCINATE 125 MG/2ML
INJECTION, POWDER, LYOPHILIZED, FOR SOLUTION INTRAMUSCULAR; INTRAVENOUS
Status: DISPENSED
Start: 2020-01-23 | End: 2020-01-23

## 2020-01-23 RX ORDER — DOCUSATE SODIUM 100 MG/1
100 CAPSULE, LIQUID FILLED ORAL 2 TIMES DAILY
Status: DISCONTINUED | OUTPATIENT
Start: 2020-01-23 | End: 2020-01-25 | Stop reason: SDUPTHER

## 2020-01-23 RX ADMIN — METHYLPREDNISOLONE SODIUM SUCCINATE 125 MG: 125 INJECTION, POWDER, FOR SOLUTION INTRAMUSCULAR; INTRAVENOUS at 09:55

## 2020-01-23 RX ADMIN — SODIUM CHLORIDE, PRESERVATIVE FREE 10 ML: 5 INJECTION INTRAVENOUS at 09:55

## 2020-01-23 RX ADMIN — HEPARIN SODIUM 5000 UNITS: 5000 INJECTION INTRAVENOUS; SUBCUTANEOUS at 20:19

## 2020-01-23 RX ADMIN — SENNOSIDES 8.6 MG: 8.6 TABLET, FILM COATED ORAL at 20:42

## 2020-01-23 RX ADMIN — DIPHENHYDRAMINE HYDROCHLORIDE 50 MG: 50 INJECTION, SOLUTION INTRAMUSCULAR; INTRAVENOUS at 09:55

## 2020-01-23 RX ADMIN — METOPROLOL TARTRATE 50 MG: 50 TABLET, FILM COATED ORAL at 20:17

## 2020-01-23 RX ADMIN — DOCUSATE SODIUM 100 MG: 100 CAPSULE, LIQUID FILLED ORAL at 20:42

## 2020-01-23 RX ADMIN — SODIUM BICARBONATE 650 MG: 650 TABLET ORAL at 20:17

## 2020-01-23 RX ADMIN — SODIUM CHLORIDE, PRESERVATIVE FREE 10 ML: 5 INJECTION INTRAVENOUS at 20:43

## 2020-01-23 RX ADMIN — ATORVASTATIN CALCIUM 20 MG: 20 TABLET, FILM COATED ORAL at 20:17

## 2020-01-23 ASSESSMENT — PAIN SCALES - GENERAL
PAINLEVEL_OUTOF10: 0
PAINLEVEL_OUTOF10: 8
PAINLEVEL_OUTOF10: 0
PAINLEVEL_OUTOF10: 8

## 2020-01-23 NOTE — PROGRESS NOTES
Nutrition Assessment    Type and Reason for Visit: Initial, Positive Nutrition Screen(weight loss, poor intake)    Nutrition Recommendations: Continue cardiac diet. Monitor intake to assess need for supplement. Nutrition Assessment: Pt was NPO this morning for cardiac cath. Now transferred to CVICU and advanced to cardiac diet. Malnutrition Assessment:  · Malnutrition Status: At risk for malnutrition  · Context: Acute illness or injury  · Findings of the 6 clinical characteristics of malnutrition (Minimum of 2 out of 6 clinical characteristics is required to make the diagnosis of moderate or severe Protein Calorie Malnutrition based on AND/ASPEN Guidelines):  1. Energy Intake-Unable to assess, Unable to assess    2. Weight Loss-5% loss or greater, in 1 month per EMR  3. Fat Loss-Unable to assess,    4. Muscle Loss-Unable to assess,    5. Fluid Accumulation-No significant fluid accumulation,    6.  Strength-Not measured    Nutrition Risk Level:  Moderate    Nutrient Needs:  · Estimated Daily Total Kcal: 25 - 28 kcal/kg = 1890 - 2125 kcal/day  · Estimated Daily Protein (g): 1.2 - 1.4 g/kg = 90 - 105 g protein/day  · Estimated Daily Total Fluid (ml/day): per physician    Nutrition Diagnosis:   · Problem: Inadequate oral intake  · Etiology: related to Insufficient energy/nutrient consumption     Signs and symptoms:  as evidenced by Patient report of, Weight loss greater than or equal to 5% in 1 month    Objective Information:  · Nutrition-Focused Physical Findings:    · Wound Type: None  · Current Nutrition Therapies:  · Oral Diet Orders: Cardiac   · Oral Diet intake: Unable to assess(diet just advanced)  · Oral Nutrition Supplement (ONS) Orders: None  · ONS intake: Unable to assess  · Anthropometric Measures:  · Ht: 5' 7\" (170.2 cm)   · Current Body Wt: 167 lb (75.8 kg)  · Admission Body Wt: 167 lb (75.8 kg)  · Usual Body Wt: 179 lb (81.2 kg)([facility] 12/6/19)  · % Weight Change:  ,  Possible 6% weight loss x ~1 month per EMR  · Ideal Body Wt: 148 lb (67.1 kg), % Ideal Body 113%  · BMI Classification: BMI 25.0 - 29.9 Overweight    Nutrition Interventions:   Start oral diet  Education Not Indicated, Continued Inpatient Monitoring    Nutrition Evaluation:   · Evaluation: Goals set   · Goals: Meet % estimated nutrition needs    · Monitoring: Meal Intake, Weight, Pertinent Labs      Electronically signed by Ty Naylor RD, TAMELA, CNSC on 1/23/20 at 12:43 PM

## 2020-01-23 NOTE — CARE COORDINATION
Case Management Initial Discharge Plan  Jodie Guy,         Readmission Risk              Risk of Unplanned Readmission:        16             Met with:patient to discuss discharge plans. Information verified: address, contacts, phone number, , insurance Yes  PCP: Justa Barajas MD  Date of last visit: yesterday     Insurance Provider: [employer]     Discharge Planning  Current Residence:  Private home   Living Arrangements:  Alone       Home has 1 stories/4 stairs to climb to enter the home   Support Systems:  Alone       Current Services PTA:  None   Agency: none         Patient able to perform ADL's:Independent  DME in home:  None    DME used to aid ambulation prior to admission:   none  DME used during admission:  None     Potential Assistance Needed:  N/A    Pharmacy: [facility]    Potential Assistance Purchasing Medications:  No  Does patient want to participate in local refill/ meds to beds program?  No    Patient agreeable to home care: No  [place] of choice provided:  n/a      Type of Home Care Services:  None  Patient expects to be discharged to:  home    Prior SNF/Rehab Placement and Facility: none   Agreeable to SNF/Rehab: No  [place] of choice provided: n/a   Evaluation: n/a    Expected Discharge date:     Follow Up Appointment: Best Day/ Time:  del     Transportation provider: per family   Transportation arrangements needed for discharge: No    Discharge Plan:   Patient lives at home alone. He was here -12/15 for obstructing right ureteric calculus and had cysto, pyelogram. Had an elevate troponin and had stress test done and was instructed follow cardiology for an outpatient cardiac cath. Patient is now admitted with chest pain and will need to monitor for any further work up and possible cardiac rehab. Did discuss home care and he is refusing. He states his house is \"hoarder like\". He states it got this way due to his poor health.      He is an active

## 2020-01-23 NOTE — CONSULTS
CYSTOSCOPY  12/06/2019    stone removal, stent placement right    CYSTOSCOPY N/A 12/6/2019    CYSTOSCOPY, Bilateral Pyelogram, URETERAL STENT INSERTION on the Right, and Stone removal on the right performed by Mindi Ramírez MD at [place] [address]         Prior to Admission medications    Medication Sig Start Date End Date Taking? Authorizing Provider   psyllium (KONSYL) 28.3 % PACK Take 1 packet by mouth daily   Yes Historical Provider, MD   aspirin 81 MG chewable tablet Take 1 tablet by mouth daily 12/16/19  Yes Artem June MD   albuterol-ipratropium (COMBIVENT RESPIMAT)  MCG/ACT AERS inhaler Inhale 1 puff into the lungs every 6 hours 12/13/19  Yes Artem June MD   morphine (MS CONTIN) 15 MG extended release tablet Take 15 mg by mouth 2 times daily as needed for Pain.   11/7/19  Yes Miguelina L Ice   sodium bicarbonate 650 MG tablet Take 1 tablet by mouth 2 times daily 12/15/19   Artem June MD   oxybutynin (DITROPAN) 5 MG tablet Take 1 tablet by mouth 3 times daily 12/15/19   Artem June MD   atorvastatin (LIPITOR) 20 MG tablet Take 1 tablet by mouth nightly 12/13/19   Artem June MD   tamsulosin (FLOMAX) 0.4 MG capsule Take 1 capsule by mouth daily 12/14/19   Artem June MD   metoprolol tartrate (LOPRESSOR) 50 MG tablet Take 1 tablet by mouth 2 times daily 12/13/19   Artem June MD   amLODIPine (NORVASC) 10 MG tablet Take 1 tablet by mouth daily 12/14/19   Artem June MD       Scheduled Meds:   sodium chloride flush  10 mL Intravenous 2 times per day    methylPREDNISolone sodium        diphenhydrAMINE        amLODIPine  10 mg Oral Daily    atorvastatin  20 mg Oral Nightly    metoprolol tartrate  50 mg Oral BID    oxybutynin  5 mg Oral TID    sodium bicarbonate  650 mg Oral BID    tamsulosin  0.4 mg Oral Daily    aspirin  325 mg Oral Daily    heparin (porcine)  5,000 Units Subcutaneous 3 times per day    morphine  30 mg Oral Daily     Continuous Infusions:   sodium chloride 100 mL/hr at 01/23/20 1145     PRN Meds:sodium chloride flush, acetaminophen, fentanNYL, magnesium hydroxide, ipratropium-albuterol, nitroGLYCERIN, ondansetron **OR** ondansetron    Allergies   Allergen Reactions    Iodinated Diagnostic Agents Anaphylaxis     FACE SWELLS UP       Social History     Socioeconomic History    Marital status: Single     Spouse name: Not on file    Number of children: Not on file    Years of education: Not on file    Highest education level: Not on file   Occupational History    Not on file   Social Needs    Financial resource strain: Not on file    Food insecurity:     Worry: Not on file     Inability: Not on file    Transportation needs:     Medical: Not on file     Non-medical: Not on file   Tobacco Use    Smoking status: Never Smoker    Smokeless tobacco: Never Used   Substance and Sexual Activity    Alcohol use: No    Drug use: No    Sexual activity: Not on file   Lifestyle    Physical activity:     Days per week: Not on file     Minutes per session: Not on file    Stress: Not on file   Relationships    Social connections:     Talks on phone: Not on file     Gets together: Not on file     Attends Yarsanism service: Not on file     Active member of club or organization: Not on file     Attends meetings of clubs or organizations: Not on file     Relationship status: Not on file    Intimate partner violence:     Fear of current or ex partner: Not on file     Emotionally abused: Not on file     Physically abused: Not on file     Forced sexual activity: Not on file   Other Topics Concern    Not on file   Social History Narrative    Not on file       History reviewed. No pertinent family history.       Physical Exam:  Vitals:    01/23/20 1330 01/23/20 1345 01/23/20 1400 01/23/20 1415   BP: (!) 152/105 (!) 153/101 (!) 152/105 (!) 154/100   Pulse: 90 89 86 90   Resp:       Temp:       TempSrc:       SpO2: 98% 96% 97% 98%   Weight:

## 2020-01-23 NOTE — PROGRESS NOTES
Nutrition Problem: Inadequate oral intake  Intervention: Food and/or Nutrient Delivery: Start oral diet  Nutritional Goals: Meet % estimated nutrition needs yes yes yes yes yes

## 2020-01-23 NOTE — CONSULTS
Reason for Consult: Chest pain  Requesting Physician: Haylie Gallegos MD    CHIEF COMPLAINT: Chest pain    History Obtained From:  patient, electronic medical record    HISTORY OF PRESENT ILLNESS:      The patient is a 77 y.o. male with significant past medical history of aortic stenosis, carotid artery disease, diabetes, and recent hospitalization in December for right hydronephrosis and kidney stone and had an elevated troponin at that time with abnormal stress test that was treated conservatively who presents with chest pain. Reports for the past couple weeks he has had worsening chest pain with exertion and became concerned and saw Dr. Marcelina Portillo yesterday and was sent to the hospital.  He denies any chest pain at rest.  Denies shortness of breath, palpitations, syncope, near syncope. Past Medical History:    Past Medical History:   Diagnosis Date    Back pain     on 12/6/19 pt states is presently in pain mgmnt    Diabetes mellitus ([address]     Kidney stone      Past Surgical History:    Past Surgical History:   Procedure Laterality Date    BACK SURGERY      lumbar    CYSTOSCOPY  12/06/2019    stone removal, stent placement right    CYSTOSCOPY N/A 12/6/2019    CYSTOSCOPY, Bilateral Pyelogram, URETERAL STENT INSERTION on the Right, and Stone removal on the right performed by Ynes Wyman MD at [facility] Medications:  Prior to Admission medications    Medication Sig Start Date End Date Taking? Authorizing Provider   psyllium (KONSYL) 28.3 % PACK Take 1 packet by mouth daily   Yes Historical Provider, MD   aspirin 81 MG chewable tablet Take 1 tablet by mouth daily 12/16/19  Yes Haylie Gallegos MD   albuterol-ipratropium (COMBIVENT RESPIMAT)  MCG/ACT AERS inhaler Inhale 1 puff into the lungs every 6 hours 12/13/19  Yes Haylie Gallegos MD   morphine (MS CONTIN) 15 MG extended release tablet Take 15 mg by mouth 2 times daily as needed for Pain.   11/7/19  Yes Miguelina Wilcox   sodium bicarbonate 650 MG tablet Take 1 tablet by mouth 2 times daily 12/15/19   Balbir Johnson MD   oxybutynin (DITROPAN) 5 MG tablet Take 1 tablet by mouth 3 times daily 12/15/19   Char Ramirez MD   atorvastatin (LIPITOR) 20 MG tablet Take 1 tablet by mouth nightly 12/13/19   Char Ramirez MD   tamsulosin (FLOMAX) 0.4 MG capsule Take 1 capsule by mouth daily 12/14/19   Char Ramirez MD   metoprolol tartrate (LOPRESSOR) 50 MG tablet Take 1 tablet by mouth 2 times daily 12/13/19   Char Ramirez MD   amLODIPine (NORVASC) 10 MG tablet Take 1 tablet by mouth daily 12/14/19   Balbir Johnson MD     Current Medications:    Current Facility-Administered Medications   Medication Dose Route Frequency Provider Last Rate Last Dose    acetaminophen (TYLENOL) tablet 650 mg  650 mg Oral Q4H PRN Balbir Johnson MD        ipratropium-albuterol (DUONEB) nebulizer solution 1 ampule  1 ampule Inhalation Q4H PRN Balbir Johnson MD        amLODIPine (NORVASC) tablet 10 mg  10 mg Oral Daily Balbir oJhnson MD        atorvastatin (LIPITOR) tablet 20 mg  20 mg Oral Nightly Balbir Johnson MD        metoprolol tartrate (LOPRESSOR) tablet 50 mg  50 mg Oral BID Balbir Johnson MD        oxybutynin (DITROPAN) tablet 5 mg  5 mg Oral TID Balbir Johnson MD        sodium bicarbonate tablet 650 mg  650 mg Oral BID Balbir Johnson MD        tamsulosin (FLOMAX) capsule 0.4 mg  0.4 mg Oral Daily Balbir Johnson MD        sodium chloride flush 0.9 % injection 10 mL  10 mL Intravenous 2 times per day Char Ramirez MD   10 mL at 01/22/20 2130    sodium chloride flush 0.9 % injection 10 mL  10 mL Intravenous PRN Balbir Johnson MD        magnesium hydroxide (MILK OF MAGNESIA) 400 MG/5ML suspension 30 mL  30 mL Oral Daily PRN Balbir Johnson MD        aspirin EC tablet 325 mg  325 mg Oral Daily Balbir Johnson MD        nitroGLYCERIN (NITROSTAT) SL tablet 0.4 mg  0.4 mg Sublingual Q5 Min PRN Char Ramirez MD        heparin (porcine) injection 5,000 Units  5,000 Units Subcutaneous 3 times per day Balbir Johnson MD        ondansetron (ZOFRAN-ODT) disintegrating tablet 4 mg  4 mg Oral Q6H PRN Balbir Johnson MD        Or    ondansetron (ZOFRAN) injection 4 mg  4 mg Intravenous Q6H PRN Balbir Johnson MD        morphine (MS CONTIN) extended release tablet 30 mg  30 mg Oral Daily Rachel Chahal MD   30 mg at 01/22/20 2232     Allergies:  Iodinated diagnostic agents    Social History:    Social History     Socioeconomic History    Marital status: Single     Spouse name: Not on file    Number of children: Not on file    Years of education: Not on file    Highest education level: Not on file   Occupational History    Not on file   Social Needs    Financial resource strain: Not on file    Food insecurity:     Worry: Not on file     Inability: Not on file    Transportation needs:     Medical: Not on file     Non-medical: Not on file   Tobacco Use    Smoking status: Never Smoker    Smokeless tobacco: Never Used   Substance and Sexual Activity    Alcohol use: No    Drug use: No    Sexual activity: Not on file   Lifestyle    Physical activity:     Days per week: Not on file     Minutes per session: Not on file    Stress: Not on file   Relationships    Social connections:     Talks on phone: Not on file     Gets together: Not on file     Attends [employer] service: Not on file     Active member of club or organization: Not on file     Attends meetings of clubs or organizations: Not on file     Relationship status: Not on file    Intimate partner violence:     Fear of current or ex partner: Not on file     Emotionally abused: Not on file     Physically abused: Not on file     Forced sexual activity: Not on file   Other Topics Concern    Not on file   Social History Narrative    Not on file     Family History:   History reviewed. No pertinent family history.     · REVIEW OF SYSTEMS   CONSTITUTIONAL: negative except for consider ischemia          ECHO: Date:12/7/2019:  CONCLUSIONS     Summary  Mild left ventricular hypertrophy Global left ventricular systolic function  is mildly reduced Estimated ejection fraction is 45-50 %  Grade III (severe) left ventricular diastolic dysfunction. The inferolateral wall is akinetic. Basal inferior wall is akinetic. Aortic leaflet calcification appears moderate with mild stenosis. Peak instantaneous gradient 20 mmHg and mean gradient 11 mmHg. Mild to moderate mitral regurgitation. No significant pericardial effusion is seen.     Signature  ----------------------------------------------------------------------------   Electronically signed by Tanya Rushing(Sonographer) on 12/07/2019 10:42 AM  ----------------------------------------------------------------------------     ----------------------------------------------------------------------------   Electronically signed by Michelle Newby(Interpreting physician) on   12/07/2019 11:35 AM  ----------------------------------------------------------------------------    Stress Test:  12/2019:  Large severe fixed perfusion defect involving the entire inferior wall and   apex of the left ventricle.  Left lateral wall large severe partially   reversible perfusion defect.       There is left ventricular chamber dilatation.  Gated images show   inferolateral wall hypokinesis.  Combination of findings would be most   compatible with inferior and apical wall infarct along with lateral wall   infarct and nancy-infarct ischemia. Cardiology Labs:  Recent Labs     01/22/20  1520  01/22/20  1920 01/22/20  2150 01/23/20  0140   MYOGLOBIN 128*  --   --   --   --    TROPHS 112*   < > 108* 104* 101*   TROPONINT NOT REPORTED   < > NOT REPORTED NOT REPORTED NOT REPORTED    < > = values in this interval not displayed.      Warfarin PT/INR:  Lab Results   Component Value Date    PROTIME 10.3 12/07/2019    INR 1.0 12/07/2019     CBC:  Lab Results   Component Value options of treatment for his non-STEMI/unstable angina of conservative versus invasive treatment and he is agreeable for a left heart catheterization possible PCI  Patient n.p.o. for possible left heart catheterization possible PCI today. Management plan was discussed with patient, RN, and Dr. Mehreen Paredes. Thank you for the consultation.     Electronically signed by KEVIN Bryant CNP on 1/23/2020 at 9:28 AM     CC: Rafa Taylor MD

## 2020-01-23 NOTE — PROGRESS NOTES
Received call from Dr. Jayden Johnston, telephone orders received to change patient status to Inpatient instead of observation.

## 2020-01-23 NOTE — H&P
History & Physical  [facility]    Adult Hospitalist      Name: Aleck Gaucher  MRN: [id number]     Acct: [de-identified]  Room: STA SHEILA/SHEILA    Admit Date: 1/22/2020  3:03 PM  PCP: Gianfranco Welsh MD    Primary Problem  Active Problems:    Chest pain  Resolved Problems:    * No resolved hospital problems. *        Assesment:     · Chest pain  · Essential hypertension  · Kidney disease stage III  · Mixed hyperlipidemia  · Urge incontinence  · Benign prostatic hypertrophy  · Constipation  · Chronic obstructive pulmonary disease  · Chronic pain syndrome        Plan:     · Admit to progressive  · Monitor vitals closely  · SPO2 above 90%  · Pain control  · MS Contin every 12 hours  · Start aspirin  · Start Lipitor  · Resume Lopressor  · Serial cardiac enzymes  · Serial EKG  · Resume Ditropan  · Resume Flomax  · Resume Norvasc  · Oxygen PRN  · Nitro glycerin as needed  · Hold ACE inhibitor secondary to renal impairment  · Antiemetics Zofran as needed  · DuoNeb as needed  · RT eval  · Check CBC, BMP daily  · Check LFTs  · Get lipid panel  · Consult cardiology  · DVT and GI prophylaxis. Chief Complaint:     Chief Complaint   Patient presents with    Chest Pain         History of Present Illness:      Aleck Gaucher is a 77 y.o.  male who presents with Chest Pain    Patient presents with complaints of pain over his left to mid chest.  She says pain has been going on for almost 1 month now. Pain is moderate to severe. Last several minutes resolve spontaneously. Denies any radiation to his shoulders or back. Has radiation to arms or abdomen. Denies any nausea or diaphoresis associated with that. Denies palpitations or dyspnea. Denies fever or orthopnea. States he has had dry cough    Patient was admitted to the hospital earlier for nephrolithiasis. Patient had acute kidney injury and has a history of chronic kidney disease.   Says he has had bronchitis in the past and is unwilling to lay in the bed which daily 19   Balbir Johnson MD   amLODIPine (NORVASC) 10 MG tablet Take 1 tablet by mouth daily 19   Triston Mcdonald MD        Allergies:       Iodinated diagnostic agents    Social History:     Tobacco:    reports that he has never smoked. He has never used smokeless tobacco.  Alcohol:      reports no history of alcohol use. Drug Use:  reports no history of drug use. Family History:     History reviewed. No pertinent family history.       Physical Exam:     Vitals:  BP (!) 133/96   Pulse 87   Temp 98.2 °F (36.8 °C)   Resp 18   Ht 5' 7\" (1.702 m)   Wt 166 lb 3 oz (75.4 kg)   SpO2 96%   BMI 26.03 kg/m²   Temp (24hrs), Av.2 °F (36.8 °C), Min:98.2 °F (36.8 °C), Max:98.2 °F (36.8 °C)          General appearance - alert, well appearing, and in no acute distress  Mental status - oriented to person, place, and time with normal affect  Head - normocephalic and atraumatic  Eyes - pupils equal and reactive, extraocular eye movements intact, conjunctiva clear  Ears - hearing appears to be intact  Nose - no drainage noted  Mouth - mucous membranes moist  Neck - supple, no carotid bruits, thyroid not palpable  Chest - clear to auscultation, normal effort  Heart - normal rate, regular rhythm, no murmur  Abdomen - soft, nontender, nondistended, bowel sounds present all four quadrants, no masses, hepatomegaly or splenomegaly  Neurological - normal speech, no focal findings or movement disorder noted, cranial nerves II through XII grossly intact  Extremities - peripheral pulses palpable, no pedal edema or calf pain with palpation  Skin - no gross lesions, rashes, or induration noted        Data:     Labs:    Hematology:  Recent Labs     20  1520   WBC 7.4   RBC 3.55*   HGB 10.2*   HCT 33.0*   MCV 93.0   MCH 28.7   MCHC 30.9   RDW 13.9      MPV 10.2     Chemistry:  Recent Labs     20  1520 20  1650     --    K 4.5  --      --    CO2 23  --    GLUCOSE 212*  --    BUN 31*  -- CREATININE 2.25*  --    ANIONGAP 11  --    LABGLOM 29*  --    GFRAA 35*  --    CALCIUM 9.2  --    PROBNP 31,448*  --    TROPHS 112* 101*   MYOGLOBIN 128*  --      No results for input(s): PROT, LABALBU, LABA1C, C2CHPOS, S8WMXPD, FT4, TSH, AST, ALT, LDH, GGT, ALKPHOS, LABGGT, BILITOT, BILIDIR, AMMONIA, AMYLASE, LIPASE, LACTATE, CHOL, HDL, LDLCHOLESTEROL, CHOLHDLRATIO, TRIG, VLDL, ETX89LK, PHENYTOIN, PHENYF, URICACID, POCGLU in the last 72 hours. Lab Results   Component Value Date    INR 1.0 12/07/2019    PROTIME 10.3 12/07/2019       Lab Results   Component Value Date/Time    SPECIAL NOT REPORTED 12/27/2013 10:38 AM     Lab Results   Component Value Date/Time    CULTURE NO SIGNIFICANT GROWTH 12/27/2013 10:38 AM    CULTURE  12/27/2013 10:38 AM     Performed at [address] [place] [address] [phone]       No results found for: POCPH, PHART, PH, POCPCO2, KGT1FID, PCO2, POCPO2, PO2ART, PO2, POCHCO3, NWN7YHN, HCO3, NBEA, PBEA, BEART, BE, THGBART, THB, MQZ6DGX, PBDU2ZPL, K4HPPFXR, O2SAT, FIO2    Radiology:    Xr Chest Portable    Result Date: 1/22/2020  Mild bilateral pulmonary congestion. All radiological studies reviewed                Code Status:  Prior    Electronically signed by Marie Mcclelland MD on 1/22/2020 at 7:51 PM     Copy sent to Dr. Ricky Laws MD    This note was created with the assistance of a speech-recognition program.  Although the intention is to generate a document that actually reflects the content of the visit, no guarantees can be provided that every mistake has been identified and corrected by editing. Note was updated later by me after  physical examination and  completion of the assessment.

## 2020-01-23 NOTE — PROGRESS NOTES
Patient admitted to room 1022. VS taken, telemetry placed and call light given/within reach. Patient A&O and given room orientation. Orders released/reviewed, initial assessment completed and navigator started. See chart for more detail.

## 2020-01-23 NOTE — PLAN OF CARE
Problem: Falls - Risk of:  Goal: Will remain free from falls  Description  Will remain free from falls  Outcome: Ongoing   Will remain free from falls. Fall risk assessment completed. Bed alarm on. Patient instructed to use call light. Bed locked and in lowest position, side rails up 2/4, call light and bedside table within reach, clutter removed, and non-skid footwear on when pt out of bed. Hourly rounds will continue.

## 2020-01-24 ENCOUNTER — APPOINTMENT (OUTPATIENT)
Dept: CT IMAGING | Age: 70
DRG: 234 | End: 2020-01-24
Payer: MEDICARE

## 2020-01-24 ENCOUNTER — ANESTHESIA EVENT (OUTPATIENT)
Dept: OPERATING ROOM | Age: 70
DRG: 234 | End: 2020-01-24
Payer: MEDICARE

## 2020-01-24 PROBLEM — I21.4 NSTEMI (NON-ST ELEVATED MYOCARDIAL INFARCTION) (HCC): Status: ACTIVE | Noted: 2020-01-24

## 2020-01-24 LAB
-: ABNORMAL
AMORPHOUS: ABNORMAL
ANION GAP SERPL CALCULATED.3IONS-SCNC: 11 MMOL/L (ref 9–17)
BACTERIA: ABNORMAL
BILIRUBIN URINE: NEGATIVE
BUN BLDV-MCNC: 41 MG/DL (ref 8–23)
BUN/CREAT BLD: 18 (ref 9–20)
CALCIUM SERPL-MCNC: 9.2 MG/DL (ref 8.6–10.4)
CASTS UA: ABNORMAL /LPF
CASTS UA: ABNORMAL /LPF
CHLORIDE BLD-SCNC: 106 MMOL/L (ref 98–107)
CO2: 22 MMOL/L (ref 20–31)
COLOR: YELLOW
COMMENT UA: ABNORMAL
CREAT SERPL-MCNC: 2.32 MG/DL (ref 0.7–1.2)
CRYSTALS, UA: ABNORMAL /HPF
EPITHELIAL CELLS UA: ABNORMAL /HPF (ref 0–5)
GFR AFRICAN AMERICAN: 34 ML/MIN
GFR NON-AFRICAN AMERICAN: 28 ML/MIN
GFR SERPL CREATININE-BSD FRML MDRD: ABNORMAL ML/MIN/{1.73_M2}
GFR SERPL CREATININE-BSD FRML MDRD: ABNORMAL ML/MIN/{1.73_M2}
GLUCOSE BLD-MCNC: 158 MG/DL (ref 75–110)
GLUCOSE BLD-MCNC: 243 MG/DL (ref 70–99)
GLUCOSE URINE: ABNORMAL
HCT VFR BLD CALC: 34.9 % (ref 40.7–50.3)
HEMOGLOBIN: 10.9 G/DL (ref 13–17)
KETONES, URINE: NEGATIVE
LEUKOCYTE ESTERASE, URINE: NEGATIVE
MCH RBC QN AUTO: 28.6 PG (ref 25.2–33.5)
MCHC RBC AUTO-ENTMCNC: 31.2 G/DL (ref 28.4–34.8)
MCV RBC AUTO: 91.6 FL (ref 82.6–102.9)
MRSA, DNA, NASAL: NORMAL
MUCUS: ABNORMAL
NITRITE, URINE: NEGATIVE
NRBC AUTOMATED: 0 PER 100 WBC
OTHER OBSERVATIONS UA: ABNORMAL
PDW BLD-RTO: 13.8 % (ref 11.8–14.4)
PH UA: 5 (ref 5–8)
PHOSPHORUS: 3.7 MG/DL (ref 2.5–4.5)
PLATELET # BLD: 232 K/UL (ref 138–453)
PMV BLD AUTO: 10.7 FL (ref 8.1–13.5)
POTASSIUM SERPL-SCNC: 5.3 MMOL/L (ref 3.7–5.3)
PROTEIN UA: ABNORMAL
RBC # BLD: 3.81 M/UL (ref 4.21–5.77)
RBC UA: ABNORMAL /HPF (ref 0–2)
RENAL EPITHELIAL, UA: ABNORMAL /HPF
SODIUM BLD-SCNC: 139 MMOL/L (ref 135–144)
SPECIFIC GRAVITY UA: 1.03 (ref 1–1.03)
SPECIMEN DESCRIPTION: NORMAL
TRICHOMONAS: ABNORMAL
TURBIDITY: CLEAR
URINE HGB: NEGATIVE
UROBILINOGEN, URINE: NORMAL
WBC # BLD: 7.8 K/UL (ref 3.5–11.3)
WBC UA: ABNORMAL /HPF (ref 0–5)
YEAST: ABNORMAL

## 2020-01-24 PROCEDURE — 2060000000 HC ICU INTERMEDIATE R&B

## 2020-01-24 PROCEDURE — 86850 RBC ANTIBODY SCREEN: CPT

## 2020-01-24 PROCEDURE — 96372 THER/PROPH/DIAG INJ SC/IM: CPT

## 2020-01-24 PROCEDURE — 71250 CT THORAX DX C-: CPT

## 2020-01-24 PROCEDURE — 6360000002 HC RX W HCPCS: Performed by: INTERNAL MEDICINE

## 2020-01-24 PROCEDURE — 86920 COMPATIBILITY TEST SPIN: CPT

## 2020-01-24 PROCEDURE — 2580000003 HC RX 258: Performed by: INTERNAL MEDICINE

## 2020-01-24 PROCEDURE — 99221 1ST HOSP IP/OBS SF/LOW 40: CPT | Performed by: THORACIC SURGERY (CARDIOTHORACIC VASCULAR SURGERY)

## 2020-01-24 PROCEDURE — 86900 BLOOD TYPING SEROLOGIC ABO: CPT

## 2020-01-24 PROCEDURE — 87641 MR-STAPH DNA AMP PROBE: CPT

## 2020-01-24 PROCEDURE — 6370000000 HC RX 637 (ALT 250 FOR IP): Performed by: INTERNAL MEDICINE

## 2020-01-24 PROCEDURE — 82947 ASSAY GLUCOSE BLOOD QUANT: CPT

## 2020-01-24 PROCEDURE — 36415 COLL VENOUS BLD VENIPUNCTURE: CPT

## 2020-01-24 PROCEDURE — 86901 BLOOD TYPING SEROLOGIC RH(D): CPT

## 2020-01-24 PROCEDURE — 81001 URINALYSIS AUTO W/SCOPE: CPT

## 2020-01-24 PROCEDURE — 84100 ASSAY OF PHOSPHORUS: CPT

## 2020-01-24 PROCEDURE — 85027 COMPLETE CBC AUTOMATED: CPT

## 2020-01-24 PROCEDURE — 80048 BASIC METABOLIC PNL TOTAL CA: CPT

## 2020-01-24 PROCEDURE — 6370000000 HC RX 637 (ALT 250 FOR IP): Performed by: FAMILY MEDICINE

## 2020-01-24 RX ORDER — DEXTROSE MONOHYDRATE 50 MG/ML
100 INJECTION, SOLUTION INTRAVENOUS PRN
Status: DISCONTINUED | OUTPATIENT
Start: 2020-01-24 | End: 2020-01-25 | Stop reason: SDUPTHER

## 2020-01-24 RX ORDER — SODIUM PHOSPHATE, DIBASIC AND SODIUM PHOSPHATE, MONOBASIC 7; 19 G/133ML; G/133ML
1 ENEMA RECTAL
Status: COMPLETED | OUTPATIENT
Start: 2020-01-24 | End: 2020-01-24

## 2020-01-24 RX ORDER — NICOTINE POLACRILEX 4 MG
15 LOZENGE BUCCAL PRN
Status: DISCONTINUED | OUTPATIENT
Start: 2020-01-24 | End: 2020-01-25 | Stop reason: SDUPTHER

## 2020-01-24 RX ORDER — SODIUM POLYSTYRENE SULFONATE 4.1 MEQ/G
30 POWDER, FOR SUSPENSION ORAL; RECTAL ONCE
Status: COMPLETED | OUTPATIENT
Start: 2020-01-24 | End: 2020-01-24

## 2020-01-24 RX ORDER — DEXTROSE MONOHYDRATE 25 G/50ML
12.5 INJECTION, SOLUTION INTRAVENOUS PRN
Status: DISCONTINUED | OUTPATIENT
Start: 2020-01-24 | End: 2020-01-25 | Stop reason: SDUPTHER

## 2020-01-24 RX ORDER — 0.9 % SODIUM CHLORIDE 0.9 %
20 INTRAVENOUS SOLUTION INTRAVENOUS ONCE
Status: DISCONTINUED | OUTPATIENT
Start: 2020-01-24 | End: 2020-01-25

## 2020-01-24 RX ADMIN — OXYBUTYNIN CHLORIDE 5 MG: 5 TABLET ORAL at 09:29

## 2020-01-24 RX ADMIN — DOCUSATE SODIUM 100 MG: 100 CAPSULE, LIQUID FILLED ORAL at 09:29

## 2020-01-24 RX ADMIN — SODIUM CHLORIDE, PRESERVATIVE FREE 10 ML: 5 INJECTION INTRAVENOUS at 21:59

## 2020-01-24 RX ADMIN — SODIUM BICARBONATE 650 MG: 650 TABLET ORAL at 20:57

## 2020-01-24 RX ADMIN — METOPROLOL TARTRATE 50 MG: 50 TABLET, FILM COATED ORAL at 09:30

## 2020-01-24 RX ADMIN — TAMSULOSIN HYDROCHLORIDE 0.4 MG: 0.4 CAPSULE ORAL at 09:29

## 2020-01-24 RX ADMIN — SENNOSIDES 8.6 MG: 8.6 TABLET, FILM COATED ORAL at 20:58

## 2020-01-24 RX ADMIN — AMLODIPINE BESYLATE 10 MG: 10 TABLET ORAL at 09:30

## 2020-01-24 RX ADMIN — INSULIN LISPRO 1 UNITS: 100 INJECTION, SOLUTION INTRAVENOUS; SUBCUTANEOUS at 21:00

## 2020-01-24 RX ADMIN — METOPROLOL TARTRATE 50 MG: 50 TABLET, FILM COATED ORAL at 20:58

## 2020-01-24 RX ADMIN — ATORVASTATIN CALCIUM 20 MG: 20 TABLET, FILM COATED ORAL at 20:57

## 2020-01-24 RX ADMIN — SODIUM POLYSTYRENE SULFONATE 30 G: 1 POWDER ORAL; RECTAL at 11:48

## 2020-01-24 RX ADMIN — SODIUM PHOSPHATE, DIBASIC AND SODIUM PHOSPHATE, MONOBASIC 1 ENEMA: 7; 19 ENEMA RECTAL at 21:18

## 2020-01-24 RX ADMIN — HEPARIN SODIUM 5000 UNITS: 5000 INJECTION INTRAVENOUS; SUBCUTANEOUS at 05:02

## 2020-01-24 RX ADMIN — DOCUSATE SODIUM 100 MG: 100 CAPSULE, LIQUID FILLED ORAL at 20:58

## 2020-01-24 RX ADMIN — SENNOSIDES 8.6 MG: 8.6 TABLET, FILM COATED ORAL at 09:29

## 2020-01-24 RX ADMIN — SODIUM BICARBONATE 650 MG: 650 TABLET ORAL at 09:29

## 2020-01-24 RX ADMIN — ASPIRIN 325 MG: 325 TABLET, DELAYED RELEASE ORAL at 09:30

## 2020-01-24 RX ADMIN — SODIUM CHLORIDE, PRESERVATIVE FREE 10 ML: 5 INJECTION INTRAVENOUS at 08:00

## 2020-01-24 ASSESSMENT — PAIN SCALES - GENERAL
PAINLEVEL_OUTOF10: 0

## 2020-01-24 ASSESSMENT — ENCOUNTER SYMPTOMS: CHEST TIGHTNESS: 1

## 2020-01-24 NOTE — PROGRESS NOTES
[address] Hospitalist        1/23/2020   8:32 PM    Name:  Akash Dueñas  MRN:    [id number]     Acct:     [de-identified]   Room:  2027/2027-01   Day: 0     Admit Date: 1/22/2020  3:03 PM  PCP: Carolina Andres MD    C/C:   Chief Complaint   Patient presents with    Chest Pain       Assessment:      · NSTEMI   · Coronary artery disease   · Positive cardiac stress test  · CABG planned  · Essential hypertension  · Kidney disease stage III  · Mixed hyperlipidemia  · Urge incontinence  · Benign prostatic hypertrophy  · Constipation  · Chronic obstructive pulmonary disease  · Chronic pain syndrome        Plan:        · Admit to progressive  · Monitor vitals closely  · SPO2 above 90%  · Pain control  · MS Contin every 12 hours  · Start aspirin  · Start Lipitor  · Resume Lopressor  · Serial cardiac enzymes  · Serial EKG  · Resume Ditropan  · Resume Flomax  · Resume Norvasc  · Oxygen PRN  · Nitro glycerin as needed  · Hold ACE inhibitor secondary to renal impairment  · Antiemetics Zofran as needed  · DuoNeb as needed  · RT eval  · Check CBC, BMP daily  · Check LFTs  · Get lipid panel  · Consult cardiology  · DVT and GI prophylaxis  · Cardiothoracic surgery consulted  · Nephrology consulted considering CKD 3 and need for IV dye, subsequent CABG  · Start Colace 100 mg p.o. twice daily  · Start senna 8.6 twice daily  · Patient says takes extended release morphine as needed-we will hold 1 dose, will need medication choice adjustment      Subjective:     Patient seen and examined at bedside. Patient's cardiac stress test was positive. Cardiothoracic surgery has been consulted    Patient complains of constipation initially since he has been on neck narcotics     No acute complaints today. Afebrile    Pt. Denies any CP, SOB, palpitation, HA, dizziness, chills, cough, cold, changes in urination, or skin changes.         ROS:  A 10 point system reviewed and negative otherwise mentioned above.        Physical Examination:      Vitals:  BP (!) 136/90   Pulse 105   Temp 97.8 °F (36.6 °C) (Temporal)   Resp 16   Ht 5' 7\" (1.702 m)   Wt 167 lb 6.4 oz (75.9 kg)   SpO2 93%   BMI 26.22 kg/m²   Temp (24hrs), Av.7 °F (36.5 °C), Min:97.3 °F (36.3 °C), Max:97.9 °F (36.6 °C)    Weight:   Wt Readings from Last 3 Encounters:   20 167 lb 6.4 oz (75.9 kg)   12/15/19 179 lb (81.2 kg)     I/O last 3 completed shifts:  I/O last 3 completed shifts: In: 240 [P.O.:240]  Out: 275 [Urine:275]     No results for input(s): POCGLU in the last 72 hours. General appearance - alert, well appearing, and in no acute distress  Mental status - oriented to person, place, and time with normal affect  Head - normocephalic and atraumatic  Eyes - pupils equal and reactive, extraocular eye movements intact, conjunctiva clear  Ears - hearing appears to be intact  Nose - no drainage noted  Mouth - mucous membranes moist  Neck - supple, no carotid bruits, thyroid not palpable  Chest - clear to auscultation, normal effort  Heart - normal rate, regular rhythm, systolic murmur  Abdomen - soft, nontender, nondistended, bowel sounds present all four quadrants, no masses, hepatomegaly or splenomegaly  Neurological - normal speech, no focal findings or movement disorder noted, cranial nerves II through XII grossly intact  Extremities - peripheral pulses palpable, no pedal edema or calf pain with palpation  Skin - no gross lesions, rashes, or induration noted        Medications: Allergies:    Allergies   Allergen Reactions    Iodinated Diagnostic Agents Anaphylaxis     FACE SWELLS UP       Current Meds:   Current Facility-Administered Medications:     sodium chloride flush 0.9 % injection 10 mL, 10 mL, Intravenous, 2 times per day, Apoorva Blood MD    sodium chloride flush 0.9 % injection 10 mL, 10 mL, Intravenous, PRN, Apoorva Blood MD    acetaminophen (TYLENOL) tablet 650 mg, 650 mg, Oral, Q4H PRN, Apoorva Blood MD    fentaNYL (SUBLIMAZE) injection 25 mcg, 25 mcg, Intravenous, Once PRN, Samaria Reynoso MD    methylPREDNISolone sodium (SOLU-MEDROL) 125 MG injection, , , ,     diphenhydrAMINE (BENADRYL) 50 MG/ML injection, , , ,     docusate sodium (COLACE) capsule 100 mg, 100 mg, Oral, BID, Balbir Johnson MD    senna (SENOKOT) tablet 8.6 mg, 1 tablet, Oral, BID, Balbir Johnson MD    ipratropium-albuterol (DUONEB) nebulizer solution 1 ampule, 1 ampule, Inhalation, Q4H PRN, Samaria Reynoso MD    amLODIPine (NORVASC) tablet 10 mg, 10 mg, Oral, Daily, Samaria Reynoso MD    atorvastatin (LIPITOR) tablet 20 mg, 20 mg, Oral, Nightly, Samaria Reynoso MD, 20 mg at 01/23/20 2017    metoprolol tartrate (LOPRESSOR) tablet 50 mg, 50 mg, Oral, BID, Samaria Reynoso MD, 50 mg at 01/23/20 2017    oxybutynin (DITROPAN) tablet 5 mg, 5 mg, Oral, TID, Samaria Reynoso MD    sodium bicarbonate tablet 650 mg, 650 mg, Oral, BID, Samaria Reynoso MD, 650 mg at 01/23/20 2017    tamsulosin (FLOMAX) capsule 0.4 mg, 0.4 mg, Oral, Daily, Samaria Reynoso MD    aspirin EC tablet 325 mg, 325 mg, Oral, Daily, Samaria Reynoso MD    nitroGLYCERIN (NITROSTAT) SL tablet 0.4 mg, 0.4 mg, Sublingual, Q5 Min PRN, Samaria Reynoso MD    heparin (porcine) injection 5,000 Units, 5,000 Units, Subcutaneous, 3 times per day, Samaria Reynoso MD, 5,000 Units at 01/23/20 2019    ondansetron (ZOFRAN-ODT) disintegrating tablet 4 mg, 4 mg, Oral, Q6H PRN **OR** ondansetron (ZOFRAN) injection 4 mg, 4 mg, Intravenous, Q6H PRN, Samaria Reynoso MD    morphine (MS CONTIN) extended release tablet 30 mg, 30 mg, Oral, Daily, Samaria Reynoso MD, 30 mg at 01/22/20 2232      I/O (24Hr):     Intake/Output Summary (Last 24 hours) at 1/23/2020 2032  Last data filed at 1/23/2020 0333  Gross per 24 hour   Intake 240 ml   Output 275 ml   Net -35 ml       Data:           Labs:    Hematology:  Recent Labs     01/22/20  1520 01/23/20  0525   WBC 7.4 7.0   RBC 3.55* 3.50*   HGB 10.2* 10.1*   HCT 33.0* 32.2*   MCV 93.0 92.0   MCH 28.7 28.9   MCHC 30.9 31.4   RDW 13.9 13.9    207   MPV 10.2 10.0     Chemistry:  Recent Labs     01/22/20  1520  01/22/20  1920 01/22/20  2150 01/23/20  0140 01/23/20  0525     --   --   --   --  141   K 4.5  --   --   --   --  4.7     --   --   --   --  107   CO2 23  --   --   --   --  25   GLUCOSE 212*  --   --   --   --  106*   BUN 31*  --   --   --   --  33*   CREATININE 2.25*  --   --   --   --  2.33*   MG  --   --   --   --   --  1.7   ANIONGAP 11  --   --   --   --  9   LABGLOM 29*  --   --   --   --  28*   GFRAA 35*  --   --   --   --  34*   CALCIUM 9.2  --   --   --   --  9.2   PROBNP 31,448*  --   --   --   --   --    TROPHS 112*   < > 108* 104* 101*  --    MYOGLOBIN 128*  --   --   --   --   --     < > = values in this interval not displayed. Recent Labs     01/23/20  0525   PROT 6.3*   LABALBU 3.3*   AST 13   ALT 7   ALKPHOS 49   BILITOT 0.31   CHOL 177   HDL 51   LDLCHOLESTEROL 108   CHOLHDLRATIO 3.5   TRIG 88   VLDL NOT REPORTED       Lab Results   Component Value Date/Time    SPECIAL NOT REPORTED 12/27/2013 10:38 AM     Lab Results   Component Value Date/Time    CULTURE NO SIGNIFICANT GROWTH 12/27/2013 10:38 AM    CULTURE  12/27/2013 10:38 AM     Performed at [address] [place] [address], [facility] 3 [phone]       No results found for: POCPH, PHART, PH, POCPCO2, SPL9EKJ, PCO2, POCPO2, PO2ART, PO2, POCHCO3, ZJZ1WAU, HCO3, NBEA, PBEA, BEART, BE, THGBART, THB, OXE4NAC, [id number], [id number], O2SAT, FIO2    Radiology:    Xr Chest Portable    Result Date: 1/22/2020  Mild bilateral pulmonary congestion.          All radiological studies reviewed  Code Status:  Full Code        Electronically signed by Tito Hill MD on 1/23/2020 at 8:32 PM    This note was created with the assistance of a speech-recognition program.  Although the intention is to generate a document that actually reflects the content of the visit, no guarantees can be

## 2020-01-24 NOTE — CONSULTS
tartrate (LOPRESSOR) tablet 50 mg, 50 mg, Oral, BID, Fantasma Cruz MD, 50 mg at 01/24/20 0930    oxybutynin (DITROPAN) tablet 5 mg, 5 mg, Oral, TID, Fantasma Cruz MD, 5 mg at 01/24/20 0929    sodium bicarbonate tablet 650 mg, 650 mg, Oral, BID, Fantasma Cruz MD, 650 mg at 01/24/20 0929    tamsulosin (FLOMAX) capsule 0.4 mg, 0.4 mg, Oral, Daily, Fantasma Cruz MD, 0.4 mg at 01/24/20 6396    aspirin EC tablet 325 mg, 325 mg, Oral, Daily, Fantasma Cruz MD, 325 mg at 01/24/20 0930    nitroGLYCERIN (NITROSTAT) SL tablet 0.4 mg, 0.4 mg, Sublingual, Q5 Min PRN, Fantasma Cruz MD    heparin (porcine) injection 5,000 Units, 5,000 Units, Subcutaneous, 3 times per day, Fantasma Cruz MD, 5,000 Units at 01/24/20 0502    ondansetron (ZOFRAN-ODT) disintegrating tablet 4 mg, 4 mg, Oral, Q6H PRN **OR** ondansetron (ZOFRAN) injection 4 mg, 4 mg, Intravenous, Q6H PRN, Fantasma Cruz MD    morphine (MS CONTIN) extended release tablet 30 mg, 30 mg, Oral, Daily, Fantasma Cruz MD, 30 mg at 01/22/20 2232    Social Hx:   He  reports that he has never smoked. He has never used smokeless tobacco.  Family Hx:  family history is not on file. ROS:    Review of Systems   Constitutional: Negative. Respiratory: Positive for chest tightness. Cardiovascular: Positive for chest pain. Neurological: Negative. All other systems reviewed and are negative. Physical Examination    Vitals:  Vitals:    01/24/20 0900   BP: 126/81   Pulse: 83   Resp: 16   Temp:    SpO2: 96%     Physical Exam  Constitutional:       Appearance: Normal appearance. Eyes:      Conjunctiva/sclera: Conjunctivae normal.   Cardiovascular:      Rate and Rhythm: Normal rate. Heart sounds: Normal heart sounds. Pulmonary:      Effort: Pulmonary effort is normal.   Neurological:      Mental Status: He is oriented to person, place, and time.    Psychiatric:         Mood and Affect: Mood normal.         Behavior: Behavior normal.         Thought Content: Thought content normal.         Judgment: Judgment normal.     Skin:  All exposed areas are rash free  Musculoskeletal:  No signs of clubbing or cyanosis    Labs:   CBC:   Recent Labs     01/22/20  1520 01/23/20  0525 01/24/20  0334   WBC 7.4 7.0 7.8   HGB 10.2* 10.1* 10.9*   HCT 33.0* 32.2* 34.9*   MCV 93.0 92.0 91.6    207 232     BMP:  Recent Labs     01/22/20  1520 01/23/20  0525 01/24/20  0334    141 139   K 4.5 4.7 5.3    107 106   CO2 23 25 22   PHOS  --   --  3.7   BUN 31* 33* 41*   CREATININE 2.25* 2.33* 2.32*   MG  --  1.7  --      Accucheck Glucoses:No results for input(s): POCGLU in the last 72 hours. Cardiac Enzymes: No results for input(s): CKTOTAL, CKMB, CKMBINDEX, TROPONINI in the last 72 hours. PT/INR: No results for input(s): PROTIME, INR in the last 72 hours. APTT: No results for input(s): APTT in the last 72 hours.   Liver Profile:  Lab Results   Component Value Date    AST 13 01/23/2020    ALT 7 01/23/2020    BILIDIR <0.08 12/06/2019    BILITOT 0.31 01/23/2020    ALKPHOS 49 01/23/2020     Lab Results   Component Value Date    CHOL 177 01/23/2020    HDL 51 01/23/2020    TRIG 88 01/23/2020     TSH: No results found for: TSH  UA:   Lab Results   Component Value Date    COLORU YELLOW 01/24/2020    PHUR 5.0 01/24/2020    WBCUA 0 TO 2 01/24/2020    RBCUA 0 TO 2 01/24/2020    MUCUS NOT REPORTED 01/24/2020    TRICHOMONAS NOT REPORTED 01/24/2020    YEAST NOT REPORTED 01/24/2020    BACTERIA NOT REPORTED 01/24/2020    SPECGRAV 1.034 01/24/2020    LEUKOCYTESUR NEGATIVE 01/24/2020    UROBILINOGEN Normal 01/24/2020    BILIRUBINUR NEGATIVE 01/24/2020    GLUCOSEU 1+ 01/24/2020    AMORPHOUS NOT REPORTED 01/24/2020         Testing:  Cardiac cath:    Conclusions      Procedure Summary      post- transradial left heart catherization with coronary angiography and   left ventricular pressure   severe multiple vessel CAD as described above   elevated LV end diastolic pressure 37 mmHg   no gradient elevated myocardial infarction) [facility])       Recommendation:  I believe Mr. Dheeraj Villafuerte would benefit optimally from Urgent CABG. He understands that there is a chance he will end up on dialysis and he and the renal team are fully aware and accept this risk. He understands that he could die of a heart attack or go onto severe CHF without bypass surgery and wsihed for the best revascularizarion he can get. Dr. Michelle Cranker does not feel that stenting would be best but he also knows the targets are difficult. The best I can expect for is CABG X 2 with LIMA to LAD and RSVG kylie the occluded RAMUS which supplies the lateral wall. Renal will be ready for dialysis tomorrow afternoon if necessary. I have discussed the proposed procedure, along with its risk, benefits, and therapeutic alternatives. Specific risks discussedincluded death, stroke, mediastinitis, re-operation for bleeding, and atrial fibrillation. We have also discussed the potential need for blood transfusion, along with its risks and benefits. He appears to understand,and consents to proceed.   Surgery has been tentatively scheduled for tomorrow AM.

## 2020-01-24 NOTE — PROGRESS NOTES
Dr. Jolie Dyer rounded on patient - RN notified him of plan for surgery tomorrow. MD ordered kayexalate 30mg once today. RN placed order and notified patient's nurse Kelly Calvin.

## 2020-01-24 NOTE — PROGRESS NOTES
Nephrology progress note    Reason for Consult:  Elevated creatinine    Requesting Physician:  Nell Dubois MD    Interval history:  Creatinine stable at baseline   no new complaints    HISTORY OF PRESENT ILLNESS:    The patient is a 77 y.o. male who presents with chest pain, unstable angina, NSTEMI. He underwent cardiac cath earlier today, which showed severe multiple vessel CAD. Received 60mL of contrast.  Underlying CKD 3 with baseline creatinine around 2. Creatinine 2.25 yesterday and 2.33 this am. Electrolytes ok. Admitted last month for obstructing right ureteric calculus and hydronephrosis. Pt underwent cysto, pyelogram and removal of nephrolith at that time. SBP trending 120-150s./. Hx of HTN. Home meds include norvasc, metoprolol. However, he states he had stopped taking multiple medications in recently due to c/o lightheadedness, dizziness, and feeling that they were making him more confused. He states he does not know which medications he has been taking recently. He denies CP, SOB, fever currently. C/o always feeling cold, but denies chills. Denies edema. Prior to Admission medications    Medication Sig Start Date End Date Taking? Authorizing Provider   psyllium (KONSYL) 28.3 % PACK Take 1 packet by mouth daily   Yes Historical Provider, MD   aspirin 81 MG chewable tablet Take 1 tablet by mouth daily 12/16/19  Yes Nell Dubois MD   albuterol-ipratropium (COMBIVENT RESPIMAT)  MCG/ACT AERS inhaler Inhale 1 puff into the lungs every 6 hours 12/13/19  Yes Nell Dubois MD   morphine (MS CONTIN) 15 MG extended release tablet Take 15 mg by mouth 2 times daily as needed for Pain.   11/7/19  Yes Miguelina RICHMOND Ice   sodium bicarbonate 650 MG tablet Take 1 tablet by mouth 2 times daily 12/15/19   Balbir Johnson MD   oxybutynin (DITROPAN) 5 MG tablet Take 1 tablet by mouth 3 times daily 12/15/19   Nell Dubois MD   atorvastatin (LIPITOR) 20 MG tablet Take 1 tablet by mouth nightly 12/13/19 Marie Mcclelland MD   tamsulosin (FLOMAX) 0.4 MG capsule Take 1 capsule by mouth daily 12/14/19   Marie Mcclelland MD   metoprolol tartrate (LOPRESSOR) 50 MG tablet Take 1 tablet by mouth 2 times daily 12/13/19   Marie Mcclelland MD   amLODIPine (NORVASC) 10 MG tablet Take 1 tablet by mouth daily 12/14/19   Marie Mcclelland MD       Scheduled Meds:   sodium chloride  20 mL Intravenous Once    sodium polystyrene  30 g Oral Once    sodium chloride flush  10 mL Intravenous 2 times per day    docusate sodium  100 mg Oral BID    senna  1 tablet Oral BID    amLODIPine  10 mg Oral Daily    atorvastatin  20 mg Oral Nightly    metoprolol tartrate  50 mg Oral BID    oxybutynin  5 mg Oral TID    sodium bicarbonate  650 mg Oral BID    tamsulosin  0.4 mg Oral Daily    aspirin  325 mg Oral Daily    heparin (porcine)  5,000 Units Subcutaneous 3 times per day    morphine  30 mg Oral Daily     Physical Exam:  Vitals:    01/24/20 0815 01/24/20 0830 01/24/20 0845 01/24/20 0900   BP:    126/81   Pulse:    83   Resp:    16   Temp:       TempSrc:       SpO2: 93% 96% 96% 96%   Weight:       Height:         I/O last 3 completed shifts: In: 10 [I.V.:10]  Out: -       General:  Awake, alert, not in distress. Appears to be stated age. HEENT: Atraumatic, normocephalic. Anicteric sclera. Pink and moist oral mucosa. Neck supple. No JVD. Chest: Bilateral air entry, clear to auscultation, no wheezing, rhonchi or rales. Cardiovascular: RRR, S1S2, no murmur, rub or gallop. No lower extremity edema. Abdomen: Soft, non tender to palpation. Musculoskeletal:    No cyanosis or clubbing. Integumentary: Pink, warm and dry. Free from rash or lesions. Skin turgor normal.  CNS: Oriented to person, place and time. Speech clear. Face symmetrical. No tremor.      Data:    CBC:   Lab Results   Component Value Date    WBC 7.8 01/24/2020    HGB 10.9 (L) 01/24/2020    HCT 34.9 (L) 01/24/2020    MCV 91.6 01/24/2020     01/24/2020     BMP: Lab Results   Component Value Date     01/24/2020     01/23/2020     01/22/2020    K 5.3 01/24/2020    K 4.7 01/23/2020    K 4.5 01/22/2020     01/24/2020     01/23/2020     01/22/2020    CO2 22 01/24/2020    CO2 25 01/23/2020    CO2 23 01/22/2020    BUN 41 (H) 01/24/2020    BUN 33 (H) 01/23/2020    BUN 31 (H) 01/22/2020    CREATININE 2.32 (H) 01/24/2020    CREATININE 2.33 (H) 01/23/2020    CREATININE 2.25 (H) 01/22/2020    GLUCOSE 243 (H) 01/24/2020    GLUCOSE 106 (H) 01/23/2020    GLUCOSE 212 (H) 01/22/2020     CMP:   Lab Results   Component Value Date     01/24/2020    K 5.3 01/24/2020     01/24/2020    CO2 22 01/24/2020    BUN 41 01/24/2020    CREATININE 2.32 01/24/2020    GLUCOSE 243 01/24/2020    CALCIUM 9.2 01/24/2020    PROT 6.3 01/23/2020    LABALBU 3.3 01/23/2020    BILITOT 0.31 01/23/2020    ALKPHOS 49 01/23/2020    AST 13 01/23/2020    ALT 7 01/23/2020      Hepatic:   Lab Results   Component Value Date    AST 13 01/23/2020    AST 16 12/06/2019    ALT 7 01/23/2020    ALT 9 12/06/2019    BILITOT 0.31 01/23/2020    BILITOT 0.31 12/06/2019    ALKPHOS 49 01/23/2020    ALKPHOS 47 12/06/2019     BNP: No results found for: BNP  Lipids:   Lab Results   Component Value Date    CHOL 177 01/23/2020    HDL 51 01/23/2020     INR:   Lab Results   Component Value Date    INR 1.0 12/07/2019     PTH: No results found for: PTH  Phosphorus:    Lab Results   Component Value Date    PHOS 3.7 01/24/2020     Ionized Calcium: No results found for: IONCA  Magnesium:   Lab Results   Component Value Date    MG 1.7 01/23/2020     Albumin:   Lab Results   Component Value Date    LABALBU 3.3 01/23/2020     Last 3 CK, CKMB, Troponin: @LABRCNT(CKTOTAL:3,CKMB:3,TROPONINI:3)       URINE:)No results found for: [facility]    Radiology:   Reviewed. Assessment:  1. CKD 3-creatinine close to baseline  2. HTN  3. Protienuria (MACR 892mg/g 12/2019)  4. NSTEMI s/p cardiac cath 1/23.   5. Recent nephrolithiasis (12/2019)-underwent cysto, pyelogram and removal of nephrolith at that time. 6.  Borderline hyperkalemia    Plan:  Creatinine close to baseline  Case discussed with cardiothoracic surgeon, patient is going to have CABG surgery tomorrow  Explained to the patient that there will be a chance that he may require transient or permanent hemodialysis treatment after surgery  Patient showed good understanding  Will give 1 dose of Kayexalate to decrease potassium to mid normal preop  Continue metoprolol and norvasc, blood pressure at goal.  Acid-base electrolytes are at goal , continue oral sodium bicarb   Avoid nephrotoxic drugs, NSAIDs, fleet's enemas, and IV contrast exposure.   We will follow closely     Electronically signed by Justa Bartlett MD  on 1/24/2020 at 11:11 AM

## 2020-01-24 NOTE — ANESTHESIA PRE PROCEDURE
Department of Anesthesiology  Preprocedure Note       Name:  Edwin Keen   Age:  77 y.o.  :  1950                                          MRN:  [id number]         Date:  2020      Surgeon: Rashaun Wright):  Sherri Regan MD    Procedure: CABG CORONARY ARTERY BYPASS (N/A )    Medications prior to admission:   Prior to Admission medications    Medication Sig Start Date End Date Taking? Authorizing Provider   psyllium (KONSYL) 28.3 % PACK Take 1 packet by mouth daily   Yes Historical Provider, MD   aspirin 81 MG chewable tablet Take 1 tablet by mouth daily 19  Yes Verna Ortiz MD   albuterol-ipratropium (COMBIVENT RESPIMAT)  MCG/ACT AERS inhaler Inhale 1 puff into the lungs every 6 hours 19  Yes Verna Ortiz MD   morphine (MS CONTIN) 15 MG extended release tablet Take 15 mg by mouth 2 times daily as needed for Pain.   19  Yes Miguelina L Ice   sodium bicarbonate 650 MG tablet Take 1 tablet by mouth 2 times daily 12/15/19   Verna Ortiz MD   oxybutynin (DITROPAN) 5 MG tablet Take 1 tablet by mouth 3 times daily 12/15/19   Verna Ortiz MD   atorvastatin (LIPITOR) 20 MG tablet Take 1 tablet by mouth nightly 19   Verna Ortiz MD   tamsulosin (FLOMAX) 0.4 MG capsule Take 1 capsule by mouth daily 19   Verna Ortiz MD   metoprolol tartrate (LOPRESSOR) 50 MG tablet Take 1 tablet by mouth 2 times daily 19   Verna Ortiz MD   amLODIPine (NORVASC) 10 MG tablet Take 1 tablet by mouth daily 19   Verna Ortiz MD       Current medications:    Current Facility-Administered Medications   Medication Dose Route Frequency Provider Last Rate Last Dose    0.9 % sodium chloride bolus  20 mL Intravenous Once Sherri Regan MD        sodium chloride flush 0.9 % injection 10 mL  10 mL Intravenous 2 times per day Samaria Reynoso MD   10 mL at 20 0800    sodium chloride flush 0.9 % injection 10 mL  10 mL Intravenous PRN Samaria Reynoso MD        acetaminophen (TYLENOL) tablet 650 mg  650 mg Oral Q4H PRN Marlee Menjivar MD        docusate sodium (COLACE) capsule 100 mg  100 mg Oral BID Mariana Gallegos MD   100 mg at 01/24/20 0929    senna (SENOKOT) tablet 8.6 mg  1 tablet Oral BID Balbir Johnson MD   8.6 mg at 01/24/20 0929    ipratropium-albuterol (DUONEB) nebulizer solution 1 ampule  1 ampule Inhalation Q4H PRN Marlee Menjivar MD        amLODIPine (NORVASC) tablet 10 mg  10 mg Oral Daily Marlee Menjivar MD   10 mg at 01/24/20 0930    atorvastatin (LIPITOR) tablet 20 mg  20 mg Oral Nightly Marlee Menjivar MD   20 mg at 01/23/20 2017    metoprolol tartrate (LOPRESSOR) tablet 50 mg  50 mg Oral BID Marlee Menjivar MD   50 mg at 01/24/20 0930    oxybutynin (DITROPAN) tablet 5 mg  5 mg Oral TID Marlee Menjivar MD   5 mg at 01/24/20 0929    sodium bicarbonate tablet 650 mg  650 mg Oral BID Marlee Menjivar MD   650 mg at 01/24/20 0929    tamsulosin (FLOMAX) capsule 0.4 mg  0.4 mg Oral Daily Marlee Menjivar MD   0.4 mg at 01/24/20 9245    aspirin EC tablet 325 mg  325 mg Oral Daily Marlee Menjivar MD   325 mg at 01/24/20 0930    nitroGLYCERIN (NITROSTAT) SL tablet 0.4 mg  0.4 mg Sublingual Q5 Min PRN Marlee Menjivar MD        heparin (porcine) injection 5,000 Units  5,000 Units Subcutaneous 3 times per day Marlee Menjivar MD   Stopped at 01/24/20 1400    ondansetron (ZOFRAN-ODT) disintegrating tablet 4 mg  4 mg Oral Q6H PRN Marlee Menjivar MD        Or    ondansetron (ZOFRAN) injection 4 mg  4 mg Intravenous Q6H PRN Marlee Menjivar MD        morphine (MS CONTIN) extended release tablet 30 mg  30 mg Oral Daily Marlee Menjivar MD   30 mg at 01/22/20 2232       Allergies:     Allergies   Allergen Reactions    Iodinated Diagnostic Agents Anaphylaxis     FACE SWELLS UP       Problem List:    Patient Active Problem List   Diagnosis Code    YOSHI (acute kidney injury) ([facility] N17.9    COPD with acute exacerbation ([facility] J44.1    Abnormal cardiovascular stress test R94.39    Constipation

## 2020-01-24 NOTE — PROGRESS NOTES
Units Subcutaneous 3 times per day    morphine  30 mg Oral Daily       IV Infusions (if any):      Diagnostics:   Telemetry: Sinus rhythm    Labs:   CBC:  Recent Labs     01/23/20  0525 01/24/20  0334   WBC 7.0 7.8   HGB 10.1* 10.9*   HCT 32.2* 34.9*    232     Magnesium:  Recent Labs     01/23/20  0525   MG 1.7     BMP:  Recent Labs     01/23/20  0525 01/24/20  0334    139   K 4.7 5.3   CALCIUM 9.2 9.2   CO2 25 22   BUN 33* 41*   CREATININE 2.33* 2.32*   LABGLOM 28* 28*   GLUCOSE 106* 243*     BNP:  Recent Labs     01/22/20  1520   PROBNP 31,448*     PT/INR:No results for input(s): PROTIME, INR in the last 72 hours. APTT:No results for input(s): APTT in the last 72 hours. CARDIAC ENZYMES:  Recent Labs     01/22/20  1520  01/22/20  1920 01/22/20  2150 01/23/20  0140   MYOGLOBIN 128*  --   --   --   --    TROPHS 112*   < > 108* 104* 101*   TROPONINT NOT REPORTED   < > NOT REPORTED NOT REPORTED NOT REPORTED    < > = values in this interval not displayed.      FASTING LIPID PANEL:  Lab Results   Component Value Date    HDL 51 01/23/2020    TRIG 88 01/23/2020     LIVER PROFILE:  Recent Labs     01/23/20  0525   AST 13   ALT 7   LABALBU 3.3*   ALKPHOS 52   BILITOT 0.31   PROT 6.3*   ALBUMIN NOT REPORTED        ASSESSMENT:    · NSTEMI with unstable angina, currently free of angina awaiting CABG tomorrow  · CAD with severe multivessel CAD and transradial cardiac catheterization 1/23/2020, awaiting bypass surgery  · Recent abnormal stress test with large inferior/lateral infarct with nancy-infarct ischemia December 2019  · Mild aortic stenosis with mild LV dysfunction on echocardiogram done December 2019, no gradient across the aortic valve on cardiac catheterization done 1/23/2020  · Renal insufficiency, managed by others  · Diabetes, managed by others  · History of carotid disease and left subclavian stenosis  · Dyslipidemia   · Hallucinations since last admission, managed by others  · IVP dye allergy with facial swelling about 30 years ago    PLAN:  1. Continue current cardiac medications. 2. Will increase statin dose postoperatively, patient is planning on having bypass surgery in the morning. Discussed with patient, RN, and Dr. Alison Sweeney.     KEVIN Gutierrez - CNP

## 2020-01-24 NOTE — FLOWSHEET NOTE
Patient is awake and alert while sitting up in bed. Patient is approachable and engages in conversation easily. Patient shares about his life and many of the difficult things that he has experienced in his life. Patient reports a sister for support but does not acknowledge any spiritual or Gnosticist affiliation. Patient shares his concerns about evil in the world but does not acknowledge any spiritual or emotional concerns. Patient talks profusely and may be using conversation to hide his anxiety about his condition. Otherwise, patient appears to be coping adequately. Patient expresses appreciation for the visit. Writer provides listening presence and emotional support. Spiritual Care will follow as needed.        01/23/20 2015   Encounter Summary   Services provided to: Patient   Referral/Consult From: [facility] Family members   Continue Visiting   (1/23/20)   Complexity of Encounter Moderate   Length of Encounter 30 minutes   Routine   Type Initial   Assessment Approachable   Intervention Active listening;Explored feelings, thoughts, concerns;Explored coping resources;Nurtured hope   Outcome Expressed gratitude

## 2020-01-25 ENCOUNTER — ANESTHESIA (OUTPATIENT)
Dept: OPERATING ROOM | Age: 70
DRG: 234 | End: 2020-01-25
Payer: MEDICARE

## 2020-01-25 ENCOUNTER — APPOINTMENT (OUTPATIENT)
Dept: GENERAL RADIOLOGY | Age: 70
DRG: 234 | End: 2020-01-25
Payer: MEDICARE

## 2020-01-25 VITALS
OXYGEN SATURATION: 100 % | DIASTOLIC BLOOD PRESSURE: 57 MMHG | TEMPERATURE: 96.1 F | SYSTOLIC BLOOD PRESSURE: 95 MMHG | RESPIRATION RATE: 10 BRPM

## 2020-01-25 LAB
-: NORMAL
ABSOLUTE EOS #: 0.09 K/UL (ref 0–0.44)
ABSOLUTE IMMATURE GRANULOCYTE: 0.09 K/UL (ref 0–0.3)
ABSOLUTE LYMPH #: 1.22 K/UL (ref 1.1–3.7)
ABSOLUTE MONO #: 0.7 K/UL (ref 0.1–1.2)
AMORPHOUS: NORMAL
ANION GAP SERPL CALCULATED.3IONS-SCNC: 11 MMOL/L (ref 9–17)
ANION GAP SERPL CALCULATED.3IONS-SCNC: 13 MMOL/L (ref 9–17)
ANION GAP SERPL CALCULATED.3IONS-SCNC: 14 MMOL/L (ref 9–17)
BACTERIA: NORMAL
BASOPHILS # BLD: 0 % (ref 0–2)
BASOPHILS ABSOLUTE: 0.04 K/UL (ref 0–0.2)
BILIRUBIN URINE: NEGATIVE
BUN BLDV-MCNC: 43 MG/DL (ref 8–23)
BUN BLDV-MCNC: 43 MG/DL (ref 8–23)
BUN BLDV-MCNC: 55 MG/DL (ref 8–23)
BUN/CREAT BLD: 18 (ref 9–20)
BUN/CREAT BLD: 20 (ref 9–20)
BUN/CREAT BLD: 22 (ref 9–20)
CALCIUM SERPL-MCNC: 7.3 MG/DL (ref 8.6–10.4)
CALCIUM SERPL-MCNC: 8 MG/DL (ref 8.6–10.4)
CALCIUM SERPL-MCNC: 9.2 MG/DL (ref 8.6–10.4)
CASTS UA: NORMAL /LPF
CHLORIDE BLD-SCNC: 104 MMOL/L (ref 98–107)
CHLORIDE BLD-SCNC: 111 MMOL/L (ref 98–107)
CHLORIDE BLD-SCNC: 114 MMOL/L (ref 98–107)
CO2: 18 MMOL/L (ref 20–31)
CO2: 19 MMOL/L (ref 20–31)
CO2: 23 MMOL/L (ref 20–31)
COLOR: YELLOW
COMMENT UA: ABNORMAL
CREAT SERPL-MCNC: 2.19 MG/DL (ref 0.7–1.2)
CREAT SERPL-MCNC: 2.35 MG/DL (ref 0.7–1.2)
CREAT SERPL-MCNC: 2.52 MG/DL (ref 0.7–1.2)
CRYSTALS, UA: NORMAL /HPF
DIFFERENTIAL TYPE: ABNORMAL
EOSINOPHILS RELATIVE PERCENT: 1 % (ref 1–4)
EPITHELIAL CELLS UA: NORMAL /HPF (ref 0–5)
FIO2: 100
FIO2: 40
FIO2: ABNORMAL
GFR AFRICAN AMERICAN: 31 ML/MIN
GFR AFRICAN AMERICAN: 34 ML/MIN
GFR AFRICAN AMERICAN: 36 ML/MIN
GFR NON-AFRICAN AMERICAN: 26 ML/MIN
GFR NON-AFRICAN AMERICAN: 28 ML/MIN
GFR NON-AFRICAN AMERICAN: 30 ML/MIN
GFR SERPL CREATININE-BSD FRML MDRD: ABNORMAL ML/MIN/{1.73_M2}
GLUCOSE BLD-MCNC: 101 MG/DL (ref 70–99)
GLUCOSE BLD-MCNC: 109 MG/DL (ref 74–106)
GLUCOSE BLD-MCNC: 115 MG/DL (ref 70–99)
GLUCOSE BLD-MCNC: 123 MG/DL (ref 75–110)
GLUCOSE BLD-MCNC: 130 MG/DL (ref 74–106)
GLUCOSE BLD-MCNC: 135 MG/DL (ref 74–106)
GLUCOSE BLD-MCNC: 140 MG/DL (ref 74–106)
GLUCOSE BLD-MCNC: 141 MG/DL (ref 74–106)
GLUCOSE BLD-MCNC: 142 MG/DL (ref 75–110)
GLUCOSE BLD-MCNC: 145 MG/DL (ref 74–106)
GLUCOSE BLD-MCNC: 146 MG/DL (ref 70–99)
GLUCOSE BLD-MCNC: 209 MG/DL (ref 74–106)
GLUCOSE BLD-MCNC: 71 MG/DL (ref 75–110)
GLUCOSE BLD-MCNC: 81 MG/DL (ref 75–110)
GLUCOSE BLD-MCNC: 82 MG/DL (ref 75–110)
GLUCOSE BLD-MCNC: 85 MG/DL (ref 75–110)
GLUCOSE BLD-MCNC: 92 MG/DL (ref 75–110)
GLUCOSE BLD-MCNC: 93 MG/DL (ref 75–110)
GLUCOSE BLD-MCNC: 99 MG/DL (ref 74–106)
GLUCOSE URINE: NEGATIVE
HCT VFR BLD CALC: 31.8 % (ref 40.7–50.3)
HCT VFR BLD CALC: 33.4 % (ref 40.7–50.3)
HEMOGLOBIN: 10 G/DL (ref 13–17)
HEMOGLOBIN: 10.3 G/DL (ref 13–17)
IMMATURE GRANULOCYTES: 1 %
INR BLD: 0.9
INR BLD: 1.2
KETONES, URINE: NEGATIVE
LEUKOCYTE ESTERASE, URINE: NEGATIVE
LYMPHOCYTES # BLD: 11 % (ref 24–43)
MAGNESIUM: 2.6 MG/DL (ref 1.6–2.6)
MCH RBC QN AUTO: 28.8 PG (ref 25.2–33.5)
MCH RBC QN AUTO: 29.2 PG (ref 25.2–33.5)
MCHC RBC AUTO-ENTMCNC: 30.8 G/DL (ref 28.4–34.8)
MCHC RBC AUTO-ENTMCNC: 31.4 G/DL (ref 28.4–34.8)
MCV RBC AUTO: 93 FL (ref 82.6–102.9)
MCV RBC AUTO: 93.3 FL (ref 82.6–102.9)
MONOCYTES # BLD: 6 % (ref 3–12)
MUCUS: NORMAL
NEGATIVE BASE EXCESS, ART: 1 (ref 0–2)
NEGATIVE BASE EXCESS, ART: 1 (ref 0–2)
NEGATIVE BASE EXCESS, ART: 3 (ref 0–2)
NEGATIVE BASE EXCESS, ART: 4 (ref 0–2)
NEGATIVE BASE EXCESS, ART: 4 (ref 0–2)
NEGATIVE BASE EXCESS, ART: 7 (ref 0–2)
NEGATIVE BASE EXCESS, ART: ABNORMAL (ref 0–2)
NEGATIVE BASE EXCESS, ART: ABNORMAL (ref 0–2)
NITRITE, URINE: NEGATIVE
NRBC AUTOMATED: 0 PER 100 WBC
NRBC AUTOMATED: 0 PER 100 WBC
O2 DEVICE/FLOW/%: ABNORMAL
OTHER OBSERVATIONS UA: NORMAL
PARTIAL THROMBOPLASTIN TIME: 20.4 SEC (ref 23–31)
PARTIAL THROMBOPLASTIN TIME: 28.8 SEC (ref 23–31)
PATIENT TEMP: 36
PATIENT TEMP: 37.5
PATIENT TEMP: ABNORMAL
PDW BLD-RTO: 14.1 % (ref 11.8–14.4)
PDW BLD-RTO: 14.7 % (ref 11.8–14.4)
PH UA: 5.5 (ref 5–8)
PHOSPHORUS: 4.6 MG/DL (ref 2.5–4.5)
PLATELET # BLD: 104 K/UL (ref 138–453)
PLATELET # BLD: 242 K/UL (ref 138–453)
PLATELET ESTIMATE: ABNORMAL
PMV BLD AUTO: 10.7 FL (ref 8.1–13.5)
PMV BLD AUTO: 11.1 FL (ref 8.1–13.5)
POC HCO3: 19.5 MMOL/L (ref 22–27)
POC HCO3: 21.5 MMOL/L (ref 22–27)
POC HCO3: 22 MMOL/L (ref 22–27)
POC HCO3: 22.6 MMOL/L (ref 22–27)
POC HCO3: 23.2 MMOL/L (ref 22–27)
POC HCO3: 23.7 MMOL/L (ref 22–27)
POC HCO3: 24.8 MMOL/L (ref 22–27)
POC HCO3: 25.1 MMOL/L (ref 22–27)
POC HEMATOCRIT: 20 % (ref 41–53)
POC HEMATOCRIT: 20 % (ref 41–53)
POC HEMATOCRIT: 24 % (ref 41–53)
POC HEMATOCRIT: 25 % (ref 41–53)
POC HEMATOCRIT: 26 % (ref 41–53)
POC HEMATOCRIT: 26 % (ref 41–53)
POC HEMATOCRIT: 27 % (ref 41–53)
POC HEMATOCRIT: 30 % (ref 41–53)
POC HEMOGLOBIN: 10.2 G/DL (ref 13.5–17.5)
POC HEMOGLOBIN: 6.8 G/DL (ref 13.5–17.5)
POC HEMOGLOBIN: 6.8 G/DL (ref 13.5–17.5)
POC HEMOGLOBIN: 8.2 G/DL (ref 13.5–17.5)
POC HEMOGLOBIN: 8.5 G/DL (ref 13.5–17.5)
POC HEMOGLOBIN: 8.8 G/DL (ref 13.5–17.5)
POC HEMOGLOBIN: 8.8 G/DL (ref 13.5–17.5)
POC HEMOGLOBIN: 9.2 G/DL (ref 13.5–17.5)
POC IONIZED CALCIUM: 0.96 MMOL/L (ref 1.13–1.33)
POC IONIZED CALCIUM: 0.99 MMOL/L (ref 1.13–1.33)
POC IONIZED CALCIUM: 1.1 MMOL/L (ref 1.13–1.33)
POC IONIZED CALCIUM: 1.12 MMOL/L (ref 1.13–1.33)
POC IONIZED CALCIUM: 1.15 MMOL/L (ref 1.13–1.33)
POC IONIZED CALCIUM: 1.16 MMOL/L (ref 1.13–1.33)
POC IONIZED CALCIUM: 1.25 MMOL/L (ref 1.13–1.33)
POC IONIZED CALCIUM: 1.49 MMOL/L (ref 1.13–1.33)
POC O2 SATURATION: 100 %
POC O2 SATURATION: 96 %
POC PCO2 TEMP: ABNORMAL MM HG
POC PCO2: 36 MM HG (ref 32–45)
POC PCO2: 38 MM HG (ref 32–45)
POC PCO2: 40 MM HG (ref 32–45)
POC PCO2: 41 MM HG (ref 32–45)
POC PCO2: 41 MM HG (ref 32–45)
POC PCO2: 43 MM HG (ref 32–45)
POC PH TEMP: ABNORMAL
POC PH: 7.31 (ref 7.35–7.45)
POC PH: 7.31 (ref 7.35–7.45)
POC PH: 7.33 (ref 7.35–7.45)
POC PH: 7.36 (ref 7.35–7.45)
POC PH: 7.39 (ref 7.35–7.45)
POC PH: 7.42 (ref 7.35–7.45)
POC PH: 7.43 (ref 7.35–7.45)
POC PH: 7.45 (ref 7.35–7.45)
POC PO2 TEMP: ABNORMAL MM HG
POC PO2: 206 MM HG (ref 75–95)
POC PO2: 257 MM HG (ref 75–95)
POC PO2: 398 MM HG (ref 75–95)
POC PO2: 407 MM HG (ref 75–95)
POC PO2: 430 MM HG (ref 75–95)
POC PO2: 431 MM HG (ref 75–95)
POC PO2: 486 MM HG (ref 75–95)
POC PO2: 92 MM HG (ref 75–95)
POC POTASSIUM: 3.7 MMOL/L (ref 3.5–5.1)
POC POTASSIUM: 4 MMOL/L (ref 3.5–5.1)
POC POTASSIUM: 4 MMOL/L (ref 3.5–5.1)
POC POTASSIUM: 4.3 MMOL/L (ref 3.5–5.1)
POC POTASSIUM: 4.3 MMOL/L (ref 3.5–5.1)
POC POTASSIUM: 4.7 MMOL/L (ref 3.5–5.1)
POC POTASSIUM: 4.9 MMOL/L (ref 3.5–5.1)
POC POTASSIUM: 5.4 MMOL/L (ref 3.5–5.1)
POC SODIUM: 138 MMOL/L (ref 136–145)
POC SODIUM: 139 MMOL/L (ref 136–145)
POC SODIUM: 141 MMOL/L (ref 136–145)
POC SODIUM: 141 MMOL/L (ref 136–145)
POC SODIUM: 142 MMOL/L (ref 136–145)
POC SODIUM: 143 MMOL/L (ref 136–145)
POC SODIUM: 144 MMOL/L (ref 136–145)
POC SODIUM: 144 MMOL/L (ref 136–145)
POSITIVE BASE EXCESS, ART: 0 (ref 0–2)
POSITIVE BASE EXCESS, ART: 1 (ref 0–2)
POSITIVE BASE EXCESS, ART: ABNORMAL (ref 0–2)
POTASSIUM SERPL-SCNC: 3.9 MMOL/L (ref 3.7–5.3)
POTASSIUM SERPL-SCNC: 4.5 MMOL/L (ref 3.7–5.3)
POTASSIUM SERPL-SCNC: 4.9 MMOL/L (ref 3.7–5.3)
PROTEIN UA: NEGATIVE
PROTHROMBIN TIME: 12.2 SEC (ref 9.7–11.6)
PROTHROMBIN TIME: 9.6 SEC (ref 9.7–11.6)
RBC # BLD: 3.42 M/UL (ref 4.21–5.77)
RBC # BLD: 3.58 M/UL (ref 4.21–5.77)
RBC # BLD: ABNORMAL 10*6/UL
RBC UA: NORMAL /HPF (ref 0–2)
RENAL EPITHELIAL, UA: NORMAL /HPF
SEG NEUTROPHILS: 81 % (ref 36–65)
SEGMENTED NEUTROPHILS ABSOLUTE COUNT: 9.3 K/UL (ref 1.5–8.1)
SODIUM BLD-SCNC: 141 MMOL/L (ref 135–144)
SODIUM BLD-SCNC: 142 MMOL/L (ref 135–144)
SODIUM BLD-SCNC: 144 MMOL/L (ref 135–144)
SPECIFIC GRAVITY UA: 1.02 (ref 1–1.03)
TCO2 (CALC), ART: 21 MMOL/L (ref 23–28)
TCO2 (CALC), ART: 23 MMOL/L (ref 23–28)
TCO2 (CALC), ART: 23 MMOL/L (ref 23–28)
TCO2 (CALC), ART: 24 MMOL/L (ref 23–28)
TCO2 (CALC), ART: 24 MMOL/L (ref 23–28)
TCO2 (CALC), ART: 25 MMOL/L (ref 23–28)
TCO2 (CALC), ART: 26 MMOL/L (ref 23–28)
TCO2 (CALC), ART: 26 MMOL/L (ref 23–28)
TRICHOMONAS: NORMAL
TURBIDITY: CLEAR
URINE HGB: ABNORMAL
UROBILINOGEN, URINE: NORMAL
WBC # BLD: 11.4 K/UL (ref 3.5–11.3)
WBC # BLD: 11.8 K/UL (ref 3.5–11.3)
WBC # BLD: ABNORMAL 10*3/UL
WBC UA: NORMAL /HPF (ref 0–5)
YEAST: NORMAL

## 2020-01-25 PROCEDURE — 2500000003 HC RX 250 WO HCPCS: Performed by: THORACIC SURGERY (CARDIOTHORACIC VASCULAR SURGERY)

## 2020-01-25 PROCEDURE — 84132 ASSAY OF SERUM POTASSIUM: CPT

## 2020-01-25 PROCEDURE — 2100000000 HC CCU R&B

## 2020-01-25 PROCEDURE — 2500000003 HC RX 250 WO HCPCS

## 2020-01-25 PROCEDURE — 93005 ELECTROCARDIOGRAM TRACING: CPT | Performed by: THORACIC SURGERY (CARDIOTHORACIC VASCULAR SURGERY)

## 2020-01-25 PROCEDURE — 71045 X-RAY EXAM CHEST 1 VIEW: CPT

## 2020-01-25 PROCEDURE — 83735 ASSAY OF MAGNESIUM: CPT

## 2020-01-25 PROCEDURE — P9016 RBC LEUKOCYTES REDUCED: HCPCS

## 2020-01-25 PROCEDURE — 86900 BLOOD TYPING SEROLOGIC ABO: CPT

## 2020-01-25 PROCEDURE — 02HV33Z INSERTION OF INFUSION DEVICE INTO SUPERIOR VENA CAVA, PERCUTANEOUS APPROACH: ICD-10-PCS | Performed by: ANESTHESIOLOGY

## 2020-01-25 PROCEDURE — 2700000000 HC OXYGEN THERAPY PER DAY

## 2020-01-25 PROCEDURE — 021009W BYPASS CORONARY ARTERY, ONE ARTERY FROM AORTA WITH AUTOLOGOUS VENOUS TISSUE, OPEN APPROACH: ICD-10-PCS | Performed by: THORACIC SURGERY (CARDIOTHORACIC VASCULAR SURGERY)

## 2020-01-25 PROCEDURE — C1751 CATH, INF, PER/CENT/MIDLINE: HCPCS | Performed by: THORACIC SURGERY (CARDIOTHORACIC VASCULAR SURGERY)

## 2020-01-25 PROCEDURE — 84295 ASSAY OF SERUM SODIUM: CPT

## 2020-01-25 PROCEDURE — 3700000000 HC ANESTHESIA ATTENDED CARE: Performed by: THORACIC SURGERY (CARDIOTHORACIC VASCULAR SURGERY)

## 2020-01-25 PROCEDURE — P9041 ALBUMIN (HUMAN),5%, 50ML: HCPCS | Performed by: THORACIC SURGERY (CARDIOTHORACIC VASCULAR SURGERY)

## 2020-01-25 PROCEDURE — 6360000002 HC RX W HCPCS: Performed by: NURSE ANESTHETIST, CERTIFIED REGISTERED

## 2020-01-25 PROCEDURE — 85730 THROMBOPLASTIN TIME PARTIAL: CPT

## 2020-01-25 PROCEDURE — 94002 VENT MGMT INPAT INIT DAY: CPT

## 2020-01-25 PROCEDURE — P9047 ALBUMIN (HUMAN), 25%, 50ML: HCPCS

## 2020-01-25 PROCEDURE — 2580000003 HC RX 258: Performed by: FAMILY MEDICINE

## 2020-01-25 PROCEDURE — B24BZZ4 ULTRASONOGRAPHY OF HEART WITH AORTA, TRANSESOPHAGEAL: ICD-10-PCS | Performed by: ANESTHESIOLOGY

## 2020-01-25 PROCEDURE — 6360000002 HC RX W HCPCS: Performed by: THORACIC SURGERY (CARDIOTHORACIC VASCULAR SURGERY)

## 2020-01-25 PROCEDURE — 6360000002 HC RX W HCPCS

## 2020-01-25 PROCEDURE — 02100Z9 BYPASS CORONARY ARTERY, ONE ARTERY FROM LEFT INTERNAL MAMMARY, OPEN APPROACH: ICD-10-PCS | Performed by: THORACIC SURGERY (CARDIOTHORACIC VASCULAR SURGERY)

## 2020-01-25 PROCEDURE — 33533 CABG ARTERIAL SINGLE: CPT | Performed by: THORACIC SURGERY (CARDIOTHORACIC VASCULAR SURGERY)

## 2020-01-25 PROCEDURE — 80048 BASIC METABOLIC PNL TOTAL CA: CPT

## 2020-01-25 PROCEDURE — 81001 URINALYSIS AUTO W/SCOPE: CPT

## 2020-01-25 PROCEDURE — P9041 ALBUMIN (HUMAN),5%, 50ML: HCPCS

## 2020-01-25 PROCEDURE — 6370000000 HC RX 637 (ALT 250 FOR IP): Performed by: THORACIC SURGERY (CARDIOTHORACIC VASCULAR SURGERY)

## 2020-01-25 PROCEDURE — 33517 CABG ARTERY-VEIN SINGLE: CPT | Performed by: THORACIC SURGERY (CARDIOTHORACIC VASCULAR SURGERY)

## 2020-01-25 PROCEDURE — 82803 BLOOD GASES ANY COMBINATION: CPT

## 2020-01-25 PROCEDURE — 37799 UNLISTED PX VASCULAR SURGERY: CPT

## 2020-01-25 PROCEDURE — 2580000003 HC RX 258: Performed by: THORACIC SURGERY (CARDIOTHORACIC VASCULAR SURGERY)

## 2020-01-25 PROCEDURE — C1894 INTRO/SHEATH, NON-LASER: HCPCS | Performed by: THORACIC SURGERY (CARDIOTHORACIC VASCULAR SURGERY)

## 2020-01-25 PROCEDURE — 85027 COMPLETE CBC AUTOMATED: CPT

## 2020-01-25 PROCEDURE — 2580000003 HC RX 258: Performed by: NURSE ANESTHETIST, CERTIFIED REGISTERED

## 2020-01-25 PROCEDURE — 85610 PROTHROMBIN TIME: CPT

## 2020-01-25 PROCEDURE — 33508 ENDOSCOPIC VEIN HARVEST: CPT | Performed by: THORACIC SURGERY (CARDIOTHORACIC VASCULAR SURGERY)

## 2020-01-25 PROCEDURE — 6370000000 HC RX 637 (ALT 250 FOR IP): Performed by: NURSE ANESTHETIST, CERTIFIED REGISTERED

## 2020-01-25 PROCEDURE — 5A1221Z PERFORMANCE OF CARDIAC OUTPUT, CONTINUOUS: ICD-10-PCS | Performed by: THORACIC SURGERY (CARDIOTHORACIC VASCULAR SURGERY)

## 2020-01-25 PROCEDURE — 85014 HEMATOCRIT: CPT

## 2020-01-25 PROCEDURE — 6370000000 HC RX 637 (ALT 250 FOR IP): Performed by: INTERNAL MEDICINE

## 2020-01-25 PROCEDURE — 36415 COLL VENOUS BLD VENIPUNCTURE: CPT

## 2020-01-25 PROCEDURE — 2720000010 HC SURG SUPPLY STERILE: Performed by: THORACIC SURGERY (CARDIOTHORACIC VASCULAR SURGERY)

## 2020-01-25 PROCEDURE — C9113 INJ PANTOPRAZOLE SODIUM, VIA: HCPCS | Performed by: THORACIC SURGERY (CARDIOTHORACIC VASCULAR SURGERY)

## 2020-01-25 PROCEDURE — 84100 ASSAY OF PHOSPHORUS: CPT

## 2020-01-25 PROCEDURE — 2709999900 HC NON-CHARGEABLE SUPPLY: Performed by: THORACIC SURGERY (CARDIOTHORACIC VASCULAR SURGERY)

## 2020-01-25 PROCEDURE — 06BP4ZZ EXCISION OF RIGHT SAPHENOUS VEIN, PERCUTANEOUS ENDOSCOPIC APPROACH: ICD-10-PCS | Performed by: THORACIC SURGERY (CARDIOTHORACIC VASCULAR SURGERY)

## 2020-01-25 PROCEDURE — 82330 ASSAY OF CALCIUM: CPT

## 2020-01-25 PROCEDURE — 82947 ASSAY GLUCOSE BLOOD QUANT: CPT

## 2020-01-25 PROCEDURE — 85025 COMPLETE CBC W/AUTO DIFF WBC: CPT

## 2020-01-25 PROCEDURE — 3700000001 HC ADD 15 MINUTES (ANESTHESIA): Performed by: THORACIC SURGERY (CARDIOTHORACIC VASCULAR SURGERY)

## 2020-01-25 PROCEDURE — 2500000003 HC RX 250 WO HCPCS: Performed by: NURSE ANESTHETIST, CERTIFIED REGISTERED

## 2020-01-25 PROCEDURE — 3600000018 HC SURGERY OHS ADDTL 15MIN: Performed by: THORACIC SURGERY (CARDIOTHORACIC VASCULAR SURGERY)

## 2020-01-25 PROCEDURE — 3600000008 HC SURGERY OHS BASE: Performed by: THORACIC SURGERY (CARDIOTHORACIC VASCULAR SURGERY)

## 2020-01-25 RX ORDER — PANTOPRAZOLE SODIUM 40 MG/10ML
40 INJECTION, POWDER, LYOPHILIZED, FOR SOLUTION INTRAVENOUS DAILY
Status: DISCONTINUED | OUTPATIENT
Start: 2020-01-25 | End: 2020-01-28 | Stop reason: ALTCHOICE

## 2020-01-25 RX ORDER — INSULIN GLARGINE 100 [IU]/ML
INJECTION, SOLUTION SUBCUTANEOUS PRN
Status: DISCONTINUED | OUTPATIENT
Start: 2020-01-25 | End: 2020-01-25 | Stop reason: SDUPTHER

## 2020-01-25 RX ORDER — LIDOCAINE HYDROCHLORIDE 20 MG/ML
INJECTION, SOLUTION EPIDURAL; INFILTRATION; INTRACAUDAL; PERINEURAL PRN
Status: DISCONTINUED | OUTPATIENT
Start: 2020-01-25 | End: 2020-01-25 | Stop reason: SDUPTHER

## 2020-01-25 RX ORDER — FENTANYL CITRATE 0.05 MG/ML
INJECTION, SOLUTION INTRAMUSCULAR; INTRAVENOUS PRN
Status: DISCONTINUED | OUTPATIENT
Start: 2020-01-25 | End: 2020-01-25 | Stop reason: SDUPTHER

## 2020-01-25 RX ORDER — DEXTROSE MONOHYDRATE 25 G/50ML
12.5 INJECTION, SOLUTION INTRAVENOUS PRN
Status: DISCONTINUED | OUTPATIENT
Start: 2020-01-25 | End: 2020-01-30 | Stop reason: HOSPADM

## 2020-01-25 RX ORDER — NICOTINE POLACRILEX 4 MG
15 LOZENGE BUCCAL PRN
Status: DISCONTINUED | OUTPATIENT
Start: 2020-01-25 | End: 2020-01-30 | Stop reason: HOSPADM

## 2020-01-25 RX ORDER — DEXTROSE MONOHYDRATE 50 MG/ML
100 INJECTION, SOLUTION INTRAVENOUS PRN
Status: DISCONTINUED | OUTPATIENT
Start: 2020-01-25 | End: 2020-01-30 | Stop reason: HOSPADM

## 2020-01-25 RX ORDER — ASPIRIN 81 MG/1
81 TABLET, CHEWABLE ORAL ONCE
Status: COMPLETED | OUTPATIENT
Start: 2020-01-25 | End: 2020-01-25

## 2020-01-25 RX ORDER — NITROGLYCERIN 20 MG/100ML
INJECTION INTRAVENOUS CONTINUOUS PRN
Status: DISCONTINUED | OUTPATIENT
Start: 2020-01-25 | End: 2020-01-25 | Stop reason: SDUPTHER

## 2020-01-25 RX ORDER — DOCUSATE SODIUM 100 MG/1
100 CAPSULE, LIQUID FILLED ORAL 2 TIMES DAILY
Status: DISCONTINUED | OUTPATIENT
Start: 2020-01-25 | End: 2020-01-30 | Stop reason: HOSPADM

## 2020-01-25 RX ORDER — PROTAMINE SULFATE 10 MG/ML
50 INJECTION, SOLUTION INTRAVENOUS
Status: ACTIVE | OUTPATIENT
Start: 2020-01-25 | End: 2020-01-25

## 2020-01-25 RX ORDER — NITROGLYCERIN 20 MG/100ML
5 INJECTION INTRAVENOUS CONTINUOUS PRN
Status: DISCONTINUED | OUTPATIENT
Start: 2020-01-25 | End: 2020-01-30 | Stop reason: HOSPADM

## 2020-01-25 RX ORDER — PROPOFOL 10 MG/ML
10 INJECTION, EMULSION INTRAVENOUS
Status: DISCONTINUED | OUTPATIENT
Start: 2020-01-25 | End: 2020-01-30 | Stop reason: HOSPADM

## 2020-01-25 RX ORDER — FENTANYL CITRATE 50 UG/ML
25 INJECTION, SOLUTION INTRAMUSCULAR; INTRAVENOUS
Status: DISCONTINUED | OUTPATIENT
Start: 2020-01-25 | End: 2020-01-30 | Stop reason: HOSPADM

## 2020-01-25 RX ORDER — FENTANYL CITRATE 50 UG/ML
50 INJECTION, SOLUTION INTRAMUSCULAR; INTRAVENOUS
Status: DISCONTINUED | OUTPATIENT
Start: 2020-01-25 | End: 2020-01-25

## 2020-01-25 RX ORDER — AMINOCAPROIC ACID 250 MG/ML
INJECTION, SOLUTION INTRAVENOUS PRN
Status: DISCONTINUED | OUTPATIENT
Start: 2020-01-25 | End: 2020-01-25 | Stop reason: SDUPTHER

## 2020-01-25 RX ORDER — FENTANYL CITRATE 50 UG/ML
25 INJECTION, SOLUTION INTRAMUSCULAR; INTRAVENOUS
Status: DISCONTINUED | OUTPATIENT
Start: 2020-01-25 | End: 2020-01-25

## 2020-01-25 RX ORDER — FENTANYL CITRATE 50 UG/ML
50 INJECTION, SOLUTION INTRAMUSCULAR; INTRAVENOUS
Status: DISCONTINUED | OUTPATIENT
Start: 2020-01-25 | End: 2020-01-30 | Stop reason: HOSPADM

## 2020-01-25 RX ORDER — LORAZEPAM 2 MG/ML
0.5 INJECTION INTRAMUSCULAR EVERY 6 HOURS PRN
Status: DISCONTINUED | OUTPATIENT
Start: 2020-01-25 | End: 2020-01-30 | Stop reason: HOSPADM

## 2020-01-25 RX ORDER — ASPIRIN 81 MG/1
81 TABLET ORAL DAILY
Status: DISCONTINUED | OUTPATIENT
Start: 2020-01-25 | End: 2020-01-30 | Stop reason: HOSPADM

## 2020-01-25 RX ORDER — BACITRACIN 50000 [USP'U]/1
INJECTION, POWDER, LYOPHILIZED, FOR SOLUTION INTRAMUSCULAR PRN
Status: DISCONTINUED | OUTPATIENT
Start: 2020-01-25 | End: 2020-01-25 | Stop reason: HOSPADM

## 2020-01-25 RX ORDER — CEFAZOLIN SODIUM 1 G/3ML
INJECTION, POWDER, FOR SOLUTION INTRAMUSCULAR; INTRAVENOUS PRN
Status: DISCONTINUED | OUTPATIENT
Start: 2020-01-25 | End: 2020-01-25 | Stop reason: SDUPTHER

## 2020-01-25 RX ORDER — SODIUM CHLORIDE 0.9 % (FLUSH) 0.9 %
10 SYRINGE (ML) INJECTION PRN
Status: DISCONTINUED | OUTPATIENT
Start: 2020-01-25 | End: 2020-01-30 | Stop reason: HOSPADM

## 2020-01-25 RX ORDER — PROPOFOL 10 MG/ML
INJECTION, EMULSION INTRAVENOUS PRN
Status: DISCONTINUED | OUTPATIENT
Start: 2020-01-25 | End: 2020-01-25 | Stop reason: SDUPTHER

## 2020-01-25 RX ORDER — SODIUM CHLORIDE 0.9 % (FLUSH) 0.9 %
10 SYRINGE (ML) INJECTION EVERY 12 HOURS SCHEDULED
Status: DISCONTINUED | OUTPATIENT
Start: 2020-01-25 | End: 2020-01-30 | Stop reason: HOSPADM

## 2020-01-25 RX ORDER — ONDANSETRON 2 MG/ML
4 INJECTION INTRAMUSCULAR; INTRAVENOUS EVERY 8 HOURS PRN
Status: DISCONTINUED | OUTPATIENT
Start: 2020-01-25 | End: 2020-01-30 | Stop reason: HOSPADM

## 2020-01-25 RX ORDER — MIDAZOLAM HYDROCHLORIDE 1 MG/ML
INJECTION INTRAMUSCULAR; INTRAVENOUS PRN
Status: DISCONTINUED | OUTPATIENT
Start: 2020-01-25 | End: 2020-01-25 | Stop reason: SDUPTHER

## 2020-01-25 RX ORDER — HEPARIN SODIUM 10000 [USP'U]/ML
INJECTION, SOLUTION INTRAVENOUS; SUBCUTANEOUS PRN
Status: DISCONTINUED | OUTPATIENT
Start: 2020-01-25 | End: 2020-01-25 | Stop reason: SDUPTHER

## 2020-01-25 RX ORDER — PROTAMINE SULFATE 10 MG/ML
INJECTION, SOLUTION INTRAVENOUS PRN
Status: DISCONTINUED | OUTPATIENT
Start: 2020-01-25 | End: 2020-01-25 | Stop reason: SDUPTHER

## 2020-01-25 RX ORDER — ALBUMIN, HUMAN INJ 5% 5 %
SOLUTION INTRAVENOUS
Status: COMPLETED
Start: 2020-01-25 | End: 2020-01-25

## 2020-01-25 RX ORDER — ALBUMIN, HUMAN INJ 5% 5 %
25 SOLUTION INTRAVENOUS PRN
Status: DISCONTINUED | OUTPATIENT
Start: 2020-01-25 | End: 2020-01-30 | Stop reason: HOSPADM

## 2020-01-25 RX ORDER — SODIUM CHLORIDE 9 MG/ML
10 INJECTION INTRAVENOUS DAILY
Status: DISCONTINUED | OUTPATIENT
Start: 2020-01-25 | End: 2020-01-28 | Stop reason: ALTCHOICE

## 2020-01-25 RX ORDER — HYDROCODONE BITARTRATE AND ACETAMINOPHEN 5; 325 MG/1; MG/1
2 TABLET ORAL EVERY 6 HOURS PRN
Status: DISCONTINUED | OUTPATIENT
Start: 2020-01-25 | End: 2020-01-26 | Stop reason: DRUGHIGH

## 2020-01-25 RX ORDER — CLOPIDOGREL BISULFATE 75 MG/1
75 TABLET ORAL DAILY
Status: DISCONTINUED | OUTPATIENT
Start: 2020-01-26 | End: 2020-01-30 | Stop reason: HOSPADM

## 2020-01-25 RX ORDER — NITROGLYCERIN 20 MG/100ML
INJECTION INTRAVENOUS PRN
Status: DISCONTINUED | OUTPATIENT
Start: 2020-01-25 | End: 2020-01-25 | Stop reason: SDUPTHER

## 2020-01-25 RX ORDER — PROPOFOL 10 MG/ML
INJECTION, EMULSION INTRAVENOUS CONTINUOUS PRN
Status: DISCONTINUED | OUTPATIENT
Start: 2020-01-25 | End: 2020-01-25 | Stop reason: SDUPTHER

## 2020-01-25 RX ORDER — HYDROCODONE BITARTRATE AND ACETAMINOPHEN 5; 325 MG/1; MG/1
1 TABLET ORAL EVERY 6 HOURS PRN
Status: DISCONTINUED | OUTPATIENT
Start: 2020-01-25 | End: 2020-01-26 | Stop reason: DRUGHIGH

## 2020-01-25 RX ORDER — ROCURONIUM BROMIDE 10 MG/ML
INJECTION, SOLUTION INTRAVENOUS PRN
Status: DISCONTINUED | OUTPATIENT
Start: 2020-01-25 | End: 2020-01-25 | Stop reason: SDUPTHER

## 2020-01-25 RX ADMIN — SODIUM BICARBONATE 650 MG: 650 TABLET ORAL at 20:17

## 2020-01-25 RX ADMIN — NITROGLYCERIN 20 MCG: 20 INJECTION INTRAVENOUS at 11:35

## 2020-01-25 RX ADMIN — OXYBUTYNIN CHLORIDE 5 MG: 5 TABLET ORAL at 20:17

## 2020-01-25 RX ADMIN — MIDAZOLAM 2 MG: 1 INJECTION INTRAMUSCULAR; INTRAVENOUS at 12:30

## 2020-01-25 RX ADMIN — DEXMEDETOMIDINE 0.3 MCG/KG/HR: 100 INJECTION, SOLUTION, CONCENTRATE INTRAVENOUS at 17:10

## 2020-01-25 RX ADMIN — FENTANYL CITRATE 50 MCG: 50 INJECTION, SOLUTION INTRAMUSCULAR; INTRAVENOUS at 23:56

## 2020-01-25 RX ADMIN — ALBUMIN (HUMAN) 25 G: 12.5 INJECTION, SOLUTION INTRAVENOUS at 13:00

## 2020-01-25 RX ADMIN — FENTANYL CITRATE 50 MCG: 50 INJECTION, SOLUTION INTRAMUSCULAR; INTRAVENOUS at 10:21

## 2020-01-25 RX ADMIN — NITROGLYCERIN 20 MCG: 20 INJECTION INTRAVENOUS at 11:30

## 2020-01-25 RX ADMIN — HYDROCODONE BITARTRATE AND ACETAMINOPHEN 2 TABLET: 5; 325 TABLET ORAL at 19:17

## 2020-01-25 RX ADMIN — PROTAMINE SULFATE 250 MG: 10 INJECTION, SOLUTION INTRAVENOUS at 11:37

## 2020-01-25 RX ADMIN — ROCURONIUM BROMIDE 50 MG: 10 INJECTION, SOLUTION INTRAVENOUS at 10:36

## 2020-01-25 RX ADMIN — Medication 2 UNITS/HR: at 11:02

## 2020-01-25 RX ADMIN — MIDAZOLAM 2 MG: 1 INJECTION INTRAMUSCULAR; INTRAVENOUS at 10:36

## 2020-01-25 RX ADMIN — METOPROLOL TARTRATE 12.5 MG: 25 TABLET ORAL at 06:15

## 2020-01-25 RX ADMIN — ASPIRIN 81 MG 81 MG: 81 TABLET ORAL at 06:15

## 2020-01-25 RX ADMIN — DEXTROSE MONOHYDRATE 2 G: 50 INJECTION, SOLUTION INTRAVENOUS at 16:01

## 2020-01-25 RX ADMIN — AMINOCAPROIC ACID 5000 MG: 250 INJECTION, SOLUTION INTRAVENOUS at 09:25

## 2020-01-25 RX ADMIN — DEXTROSE MONOHYDRATE 2 G: 50 INJECTION, SOLUTION INTRAVENOUS at 08:53

## 2020-01-25 RX ADMIN — NITROGLYCERIN 20 MCG: 20 INJECTION INTRAVENOUS at 11:25

## 2020-01-25 RX ADMIN — FENTANYL CITRATE 150 MCG: 50 INJECTION, SOLUTION INTRAMUSCULAR; INTRAVENOUS at 09:41

## 2020-01-25 RX ADMIN — FENTANYL CITRATE 250 MCG: 50 INJECTION, SOLUTION INTRAMUSCULAR; INTRAVENOUS at 08:33

## 2020-01-25 RX ADMIN — MIDAZOLAM 2 MG: 1 INJECTION INTRAMUSCULAR; INTRAVENOUS at 08:33

## 2020-01-25 RX ADMIN — AMINOCAPROIC ACID 1 G/HR: 250 INJECTION, SOLUTION INTRAVENOUS at 09:25

## 2020-01-25 RX ADMIN — DEXTROSE MONOHYDRATE 12.5 G: 500 INJECTION PARENTERAL at 11:15

## 2020-01-25 RX ADMIN — ROCURONIUM BROMIDE 50 MG: 10 INJECTION, SOLUTION INTRAVENOUS at 08:33

## 2020-01-25 RX ADMIN — ALBUMIN (HUMAN) 25 G: 12.5 INJECTION, SOLUTION INTRAVENOUS at 19:58

## 2020-01-25 RX ADMIN — HEPARIN SODIUM 10000 UNITS: 10000 INJECTION, SOLUTION INTRAVENOUS; SUBCUTANEOUS at 10:25

## 2020-01-25 RX ADMIN — CEFAZOLIN 2000 MG: 1 INJECTION, POWDER, FOR SOLUTION INTRAMUSCULAR; INTRAVENOUS at 12:30

## 2020-01-25 RX ADMIN — Medication 10 ML: at 20:18

## 2020-01-25 RX ADMIN — FENTANYL CITRATE 50 MCG: 50 INJECTION, SOLUTION INTRAMUSCULAR; INTRAVENOUS at 22:14

## 2020-01-25 RX ADMIN — LIDOCAINE HYDROCHLORIDE 100 MG: 20 INJECTION, SOLUTION EPIDURAL; INFILTRATION; INTRACAUDAL; PERINEURAL at 08:33

## 2020-01-25 RX ADMIN — Medication 10 ML: at 17:37

## 2020-01-25 RX ADMIN — ROCURONIUM BROMIDE 50 MG: 10 INJECTION, SOLUTION INTRAVENOUS at 09:34

## 2020-01-25 RX ADMIN — ACETAMINOPHEN 650 MG: 325 TABLET ORAL at 00:24

## 2020-01-25 RX ADMIN — FENTANYL CITRATE 50 MCG: 50 INJECTION, SOLUTION INTRAMUSCULAR; INTRAVENOUS at 11:40

## 2020-01-25 RX ADMIN — FENTANYL CITRATE 50 MCG: 50 INJECTION, SOLUTION INTRAMUSCULAR; INTRAVENOUS at 16:10

## 2020-01-25 RX ADMIN — PANTOPRAZOLE SODIUM 40 MG: 40 INJECTION, POWDER, FOR SOLUTION INTRAVENOUS at 17:37

## 2020-01-25 RX ADMIN — FENTANYL CITRATE 50 MCG: 50 INJECTION, SOLUTION INTRAMUSCULAR; INTRAVENOUS at 20:42

## 2020-01-25 RX ADMIN — DOCUSATE SODIUM 100 MG: 100 CAPSULE, LIQUID FILLED ORAL at 20:17

## 2020-01-25 RX ADMIN — PROPOFOL 180 MG: 10 INJECTION, EMULSION INTRAVENOUS at 08:33

## 2020-01-25 RX ADMIN — PROPOFOL 30 MCG/KG/MIN: 10 INJECTION, EMULSION INTRAVENOUS at 12:00

## 2020-01-25 RX ADMIN — NITROGLYCERIN 10 MCG/MIN: 20 INJECTION INTRAVENOUS at 11:38

## 2020-01-25 RX ADMIN — HEPARIN SODIUM 27000 UNITS: 10000 INJECTION, SOLUTION INTRAVENOUS; SUBCUTANEOUS at 10:15

## 2020-01-25 RX ADMIN — INSULIN GLARGINE 10 UNITS: 100 INJECTION, SOLUTION SUBCUTANEOUS at 11:15

## 2020-01-25 RX ADMIN — NITROGLYCERIN 20 MCG: 20 INJECTION INTRAVENOUS at 11:27

## 2020-01-25 RX ADMIN — DEXTROSE MONOHYDRATE 2 G: 50 INJECTION, SOLUTION INTRAVENOUS at 22:44

## 2020-01-25 RX ADMIN — SENNOSIDES 8.6 MG: 8.6 TABLET, FILM COATED ORAL at 20:17

## 2020-01-25 ASSESSMENT — PULMONARY FUNCTION TESTS
PIF_VALUE: 16
PIF_VALUE: 14
PIF_VALUE: 14
PIF_VALUE: 16
PIF_VALUE: 17
PIF_VALUE: 1
PIF_VALUE: 15
PIF_VALUE: 1
PIF_VALUE: 0
PIF_VALUE: 15
PIF_VALUE: 16
PIF_VALUE: 18
PIF_VALUE: 14
PIF_VALUE: 14
PIF_VALUE: 1
PIF_VALUE: 18
PIF_VALUE: 16
PIF_VALUE: 14
PIF_VALUE: 16
PIF_VALUE: 15
PIF_VALUE: 14
PIF_VALUE: 16
PIF_VALUE: 14
PIF_VALUE: 14
PIF_VALUE: 17
PIF_VALUE: 1
PIF_VALUE: 16
PIF_VALUE: 1
PIF_VALUE: 0
PIF_VALUE: 16
PIF_VALUE: 15
PIF_VALUE: 16
PIF_VALUE: 13
PIF_VALUE: 15
PIF_VALUE: 17
PIF_VALUE: 18
PIF_VALUE: 16
PIF_VALUE: 1
PIF_VALUE: 16
PIF_VALUE: 1
PIF_VALUE: 15
PIF_VALUE: 16
PIF_VALUE: 14
PIF_VALUE: 17
PIF_VALUE: 29
PIF_VALUE: 15
PIF_VALUE: 15
PIF_VALUE: 16
PIF_VALUE: 15
PIF_VALUE: 16
PIF_VALUE: 1
PIF_VALUE: 14
PIF_VALUE: 18
PIF_VALUE: 18
PIF_VALUE: 14
PIF_VALUE: 17
PIF_VALUE: 16
PIF_VALUE: 13
PIF_VALUE: 1
PIF_VALUE: 13
PIF_VALUE: 1
PIF_VALUE: 17
PIF_VALUE: 1
PIF_VALUE: 14
PIF_VALUE: 3
PIF_VALUE: 15
PIF_VALUE: 17
PIF_VALUE: 15
PIF_VALUE: 20
PIF_VALUE: 10
PIF_VALUE: 18
PIF_VALUE: 17
PIF_VALUE: 1
PIF_VALUE: 0
PIF_VALUE: 16
PIF_VALUE: 1
PIF_VALUE: 14
PIF_VALUE: 14
PIF_VALUE: 16
PIF_VALUE: 15
PIF_VALUE: 16
PIF_VALUE: 14
PIF_VALUE: 16
PIF_VALUE: 0
PIF_VALUE: 17
PIF_VALUE: 15
PIF_VALUE: 17
PIF_VALUE: 1
PIF_VALUE: 17
PIF_VALUE: 16
PIF_VALUE: 1
PIF_VALUE: 12
PIF_VALUE: 14
PIF_VALUE: 16
PIF_VALUE: 17
PIF_VALUE: 16
PIF_VALUE: 2
PIF_VALUE: 1
PIF_VALUE: 14
PIF_VALUE: 15
PIF_VALUE: 16
PIF_VALUE: 1
PIF_VALUE: 15
PIF_VALUE: 0
PIF_VALUE: 1
PIF_VALUE: 15
PIF_VALUE: 16
PIF_VALUE: 1
PIF_VALUE: 16
PIF_VALUE: 18
PIF_VALUE: 16
PIF_VALUE: 31
PIF_VALUE: 16
PIF_VALUE: 1
PIF_VALUE: 14
PIF_VALUE: 16
PIF_VALUE: 7
PIF_VALUE: 15
PIF_VALUE: 14
PIF_VALUE: 1
PIF_VALUE: 13
PIF_VALUE: 15
PIF_VALUE: 17
PIF_VALUE: 14
PIF_VALUE: 14
PIF_VALUE: 15
PIF_VALUE: 16
PIF_VALUE: 15
PIF_VALUE: 16
PIF_VALUE: 16
PIF_VALUE: 14
PIF_VALUE: 16
PIF_VALUE: 16
PIF_VALUE: 2
PIF_VALUE: 17
PIF_VALUE: 15
PIF_VALUE: 17
PIF_VALUE: 14
PIF_VALUE: 17
PIF_VALUE: 15
PIF_VALUE: 16
PIF_VALUE: 15
PIF_VALUE: 16
PIF_VALUE: 15
PIF_VALUE: 17
PIF_VALUE: 14
PIF_VALUE: 15
PIF_VALUE: 15
PIF_VALUE: 16
PIF_VALUE: 16
PIF_VALUE: 1
PIF_VALUE: 16
PIF_VALUE: 15
PIF_VALUE: 15
PIF_VALUE: 1
PIF_VALUE: 1
PIF_VALUE: 16
PIF_VALUE: 16
PIF_VALUE: 1
PIF_VALUE: 17
PIF_VALUE: 16
PIF_VALUE: 14
PIF_VALUE: 1
PIF_VALUE: 16
PIF_VALUE: 1
PIF_VALUE: 17
PIF_VALUE: 18
PIF_VALUE: 15
PIF_VALUE: 16
PIF_VALUE: 17
PIF_VALUE: 15
PIF_VALUE: 25
PIF_VALUE: 15
PIF_VALUE: 11
PIF_VALUE: 15
PIF_VALUE: 1
PIF_VALUE: 14
PIF_VALUE: 16
PIF_VALUE: 15
PIF_VALUE: 1
PIF_VALUE: 17
PIF_VALUE: 17
PIF_VALUE: 14
PIF_VALUE: 16
PIF_VALUE: 13
PIF_VALUE: 1
PIF_VALUE: 15
PIF_VALUE: 17
PIF_VALUE: 16
PIF_VALUE: 15
PIF_VALUE: 18
PIF_VALUE: 15
PIF_VALUE: 14
PIF_VALUE: 1
PIF_VALUE: 1
PIF_VALUE: 17
PIF_VALUE: 15
PIF_VALUE: 17
PIF_VALUE: 15
PIF_VALUE: 18
PIF_VALUE: 1
PIF_VALUE: 14
PIF_VALUE: 26
PIF_VALUE: 14
PIF_VALUE: 16
PIF_VALUE: 11
PIF_VALUE: 1
PIF_VALUE: 15
PIF_VALUE: 16
PIF_VALUE: 1
PIF_VALUE: 14
PIF_VALUE: 1
PIF_VALUE: 16
PIF_VALUE: 1
PIF_VALUE: 16
PIF_VALUE: 1
PIF_VALUE: 14
PIF_VALUE: 15
PIF_VALUE: 14
PIF_VALUE: 1
PIF_VALUE: 34
PIF_VALUE: 14
PIF_VALUE: 1
PIF_VALUE: 15
PIF_VALUE: 16
PIF_VALUE: 1
PIF_VALUE: 16
PIF_VALUE: 15
PIF_VALUE: 1
PIF_VALUE: 15
PIF_VALUE: 1
PIF_VALUE: 1
PIF_VALUE: 15
PIF_VALUE: 6
PIF_VALUE: 16
PIF_VALUE: 1
PIF_VALUE: 16
PIF_VALUE: 13
PIF_VALUE: 16
PIF_VALUE: 14
PIF_VALUE: 14
PIF_VALUE: 1
PIF_VALUE: 16
PIF_VALUE: 18
PIF_VALUE: 16
PIF_VALUE: 17

## 2020-01-25 ASSESSMENT — PAIN DESCRIPTION - PAIN TYPE: TYPE: SURGICAL PAIN

## 2020-01-25 ASSESSMENT — PAIN SCALES - GENERAL
PAINLEVEL_OUTOF10: 5
PAINLEVEL_OUTOF10: 7
PAINLEVEL_OUTOF10: 8
PAINLEVEL_OUTOF10: 0
PAINLEVEL_OUTOF10: 10
PAINLEVEL_OUTOF10: 7
PAINLEVEL_OUTOF10: 8
PAINLEVEL_OUTOF10: 10
PAINLEVEL_OUTOF10: 10
PAINLEVEL_OUTOF10: 9
PAINLEVEL_OUTOF10: 10

## 2020-01-25 ASSESSMENT — PAIN DESCRIPTION - LOCATION: LOCATION: CHEST

## 2020-01-25 ASSESSMENT — PAIN DESCRIPTION - ORIENTATION: ORIENTATION: MID

## 2020-01-25 NOTE — PROGRESS NOTES
[address] Hospitalist        1/25/2020   1:40 PM    Name:  Maria C Acevedo  MRN:    [id number]     Acct:     [de-identified]   Room:  2030/2030-01  IP Day: 1     Admit Date: 1/22/2020  3:03 PM  PCP: Luna Ortiz MD    C/C:   Chief Complaint   Patient presents with    Chest Pain       Assessment:      · Coronary artery bypass graft - 2 vessel 1/25/20  · NSTEMI / Unstable Angina   · Coronary artery disease   · Positive cardiac stress test  · S/p Intubation 1/25/20, extubated by PM  · Essential hypertension  · Kidney disease stage III  · Mixed hyperlipidemia  · Urge incontinence  · Benign prostatic hypertrophy  · Constipation  · Chronic obstructive pulmonary disease  · Chronic pain syndrome  · Anxiety disorder        Plan:        · Admit to progressive- > CVTU ICU  · Monitor vitals closely  · SPO2 above 90%  · Pain control  · MS Contin every 12 hours- PRN pain  · Start aspirin  · Start Lipitor  · Resume Lopressor  · Resume Ditropan  · Resume Flomax  · Resume Norvasc  · Oxygen PRN  · Nitro glycerin as needed  · Hold ACE inhibitor secondary to renal impairment  · Antiemetics Zofran as needed  · DuoNeb as needed  · RT eval  · Check CBC, BMP daily  · Check LFTs  · Get lipid panel  · Consult cardiology  · DVT and GI prophylaxis  · Cardiothoracic surgery on board  · Nephrology consulted considering CKD 3 and need for IV dye, subsequent CABG  · Start Colace 100 mg p.o. twice daily  · Start senna 8.6 twice daily  · Patient says takes extended release morphine as needed-we will hold 1 dose, will need medication choice adjustment  · Fleet enema x1 today  · Did not need to be on pressors   · Sedated w Propofol, weaning  · Ativan IV prn      Subjective:     Patient seen and examined at bedside. Pt. Intubated and Sedated. Getting irritable, waking up      ROS:  A 10 point system unable to review.         Physical Examination:      Vitals:  /88   Pulse 81   Temp 97.8 °F (36.6 °C) (Temporal)   Resp 12    5' 7\" (1.702 m)   Wt 167 lb 6.4 oz (75.9 kg)   SpO2 100%   BMI 26.22 kg/m²   Temp (24hrs), Av.9 °F (34.4 °C), Min:37.9 °F (3.3 °C), Max:98.8 °F (37.1 °C)    Weight:   Wt Readings from Last 3 Encounters:   20 167 lb 6.4 oz (75.9 kg)   12/15/19 179 lb (81.2 kg)     I/O last 3 completed shifts:  I/O last 3 completed shifts: In: 36 [P.O.:720; I.V.:10]  Out: -      Recent Labs     20  1109 20  1128 20  1210 20  1300   POCGLU 130* 209* 140* 109*         General appearance - well appearing, and in no acute distress  Mental status - sedated on Vent  Head - normocephalic and atraumatic  Eyes - pupils equal and reactive, extraocular eye movements intact, conjunctiva clear  Ears - hearing appears to be intact  Nose - no drainage noted  Mouth - mucous membranes moist  Neck - supple, no carotid bruits, thyroid not palpable  Chest - clear to auscultation, normal effort  Heart - normal rate, regular rhythm, systolic murmur  Abdomen - soft, nontender, nondistended, bowel sounds present all four quadrants, no masses, hepatomegaly or splenomegaly  Neurological - sedated   Extremities - peripheral pulses palpable, no pedal edema or calf pain with palpation  Skin - no gross lesions, rashes, or induration noted        Medications: Allergies:    Allergies   Allergen Reactions    Iodinated Diagnostic Agents Anaphylaxis     FACE SWELLS UP       Current Meds:   Current Facility-Administered Medications:     norepinephrine (LEVOPHED) 16 mg in dextrose 5 % 250 mL infusion, 0.01 mcg/kg/min, Intravenous, Continuous PRN, Bill Mota MD    EPINEPHrine (EPINEPHrine HCL) 5 mg in dextrose 5 % 250 mL infusion, 0.01 mcg/kg/min, Intravenous, Continuous PRN, Bill Mota MD    nitroGLYCERIN 50 mg in dextrose 5% 250 mL infusion, 5 mcg/min, Intravenous, Continuous PRN, Bill Mota MD    albumin human 5 % IV solution, , , ,     glucose (GLUTOSE) 40 % oral gel 15 g, 15 g, Oral, PRN,

## 2020-01-25 NOTE — PROGRESS NOTES
Patient arrived from [place] [address]. All lines and tubes intact. Pt remains intubated and sedated. Insulin, NS, and diprivan infusing. Pt vital signs stable. Post op orders received from Dr. Jose Barton. Plan to CPAP and extubate this evening. Will continue to monitor.

## 2020-01-25 NOTE — PROGRESS NOTES
Patient's sister, Petra Vasquez, at bedside and patient agrees may review preop teaching with her present. Reviewed \"Preparing for your heart surgery\" booklet and general expectations for after surgery with patient. Patient anxious, but receptive to education and verbalized understanding. Patient also reports Petra Vasquez is to be his  and states staff yesterday filled out his advance directives for him. Reviewed physical and electronic chart and none found and informed patient. Patient given copy of Advance Directives for him and his sister to review.

## 2020-01-25 NOTE — PROGRESS NOTES
Eve Gorman MD   atorvastatin (LIPITOR) 20 MG tablet Take 1 tablet by mouth nightly 12/13/19   Eve Gorman MD   tamsulosin (FLOMAX) 0.4 MG capsule Take 1 capsule by mouth daily 12/14/19   Eve Gorman MD   metoprolol tartrate (LOPRESSOR) 50 MG tablet Take 1 tablet by mouth 2 times daily 12/13/19   Eve Gorman MD   amLODIPine (NORVASC) 10 MG tablet Take 1 tablet by mouth daily 12/14/19   Eve Gorman MD       Scheduled Meds:   albumin human        sodium chloride flush  10 mL Intravenous 2 times per day    ceFAZolin (ANCEF) IVPB  2 g Intravenous Q8H    pantoprazole  40 mg Intravenous Daily    And    sodium chloride (PF)  10 mL Intravenous Daily    aspirin  81 mg Oral Daily    [START ON 1/26/2020] clopidogrel  75 mg Oral Daily    docusate sodium  100 mg Oral BID    insulin lispro  0-12 Units Subcutaneous TID WC    insulin lispro  0-6 Units Subcutaneous Nightly    senna  1 tablet Oral BID    oxybutynin  5 mg Oral TID    sodium bicarbonate  650 mg Oral BID    tamsulosin  0.4 mg Oral Daily    [MAR Hold] morphine  30 mg Oral Daily     Physical Exam:  Vitals:    01/25/20 0400 01/25/20 0800 01/25/20 1247 01/25/20 1302   BP:  139/88     Pulse:  81     Resp:  14 12    Temp: 97.4 °F (36.3 °C) 97.8 °F (36.6 °C)     TempSrc: Temporal Temporal     SpO2:  97%  100%   Weight:       Height:         I/O last 3 completed shifts: In: 36 [P.O.:720; I.V.:10]  Out: -       General:  Awake, alert, not in distress. Appears to be stated age. HEENT: Atraumatic, normocephalic. Anicteric sclera. Pink and moist oral mucosa. Neck supple. No JVD. Chest: Bilateral air entry, clear to auscultation, no wheezing, rhonchi or rales. Cardiovascular: RRR, S1S2, no murmur, rub or gallop. No lower extremity edema. Abdomen: Soft, non tender to palpation. Musculoskeletal:    No cyanosis or clubbing. Integumentary: Pink, warm and dry. Free from rash or lesions.  Skin turgor normal.  CNS: Oriented to person, place and 01/23/2020     Last 3 CK, CKMB, Troponin: @LABRCNT(CKTOTAL:3,CKMB:3,TROPONINI:3)       URINE:)No results found for: Jewels Ybarra    Radiology:   Reviewed. Assessment:  1. CKD 3-creatinine close to baseline  2. HTN  3. Proteinuria (MACR 892mg/g 12/2019)  4. NSTEMI s/p cardiac cath 1/23. 5. Recent nephrolithiasis (12/2019)-underwent cysto, pyelogram and removal of nephrolith at that time. 6.  Borderline hyperkalemia  7. S/p CABG    Plan:  Creatinine close to baseline  Electrolytes ok. K is better. Continue metoprolol. Hold norvasc for now. Monitor BP. Acid-base electrolytes are at goal , continue oral sodium bicarb   Avoid nephrotoxic drugs, NSAIDs, fleet's enemas, and IV contrast exposure. We will follow closely    Pt seen in collaboration with Dr. Timothy Libman. Electronically signed by KEVIN Howell CNP  on 1/25/2020 at 2:19 PM       Physician Addendum  I have seen and examined pt at bed side.    I reviewed and agree with CNP's note. I performed all key and critical portions of this evaluation. I agree with the plan of care as noted above.   Monitor renal function and electrolytes closely  No indication for renal replacement therapy at this point     Electronically signed by Teola Romberg MD on 01/25/20 5:02 PM

## 2020-01-25 NOTE — PROGRESS NOTES
mg Oral TID    sodium bicarbonate  650 mg Oral BID    tamsulosin  0.4 mg Oral Daily    [MAR Hold] morphine  30 mg Oral Daily       IV Infusions (if any):   norepinephrine      EPINEPHrine infusion      nitroGLYCERIN      insulin 0.99 Units/hr (01/25/20 1358)    dextrose      propofol 20 mcg/kg/min (01/25/20 1713)    dexmedetomidine (PRECEDEX) IV infusion 0.5 mcg/kg/hr (01/25/20 1736)       Diagnostics:   Telemetry: Sinus rhythm and occasional ventricular pacing    Labs:   CBC:  Recent Labs     01/25/20  0442 01/25/20  1308   WBC 11.8* 11.4*   HGB 10.3* 10.0*   HCT 33.4* 31.8*    104*     Magnesium:  Recent Labs     01/23/20  0525 01/25/20  1308   MG 1.7 2.6     BMP:  Recent Labs     01/25/20  0442 01/25/20  1308    144   K 4.9 3.9   CALCIUM 9.2 7.3*   CO2 23 19*   BUN 55* 43*   CREATININE 2.52* 2.19*   LABGLOM 26* 30*   GLUCOSE 146* 115*     BNP:  No results for input(s): BNP, PROBNP in the last 72 hours.   PT/INR:  Recent Labs     01/25/20  0442 01/25/20  1308   PROTIME 9.6* 12.2*   INR 0.9 1.2     APTT:  Recent Labs     01/25/20  0442 01/25/20  1308   APTT 20.4* 28.8     CARDIAC ENZYMES:  Recent Labs     01/22/20  1920 01/22/20  2150 01/23/20  0140   TROPHS 108* 104* 101*   TROPONINT NOT REPORTED NOT REPORTED NOT REPORTED     FASTING LIPID PANEL:  Lab Results   Component Value Date    HDL 51 01/23/2020    TRIG 88 01/23/2020     LIVER PROFILE:  Recent Labs     01/23/20  0525   AST 13   ALT 7   LABALBU 3.3*   ALKPHOS 52   BILITOT 0.31   PROT 6.3*   ALBUMIN NOT REPORTED        ASSESSMENT:    · NSTEMI with unstable angina, currently free of angina post two-vessel coronary artery bypass graft surgery with LIMA to the LAD and saphenous vein graft to the ramus done this morning  · CAD with severe multivessel CAD and transradial cardiac catheterization 1/23/2020  · Recent abnormal stress test with large inferior/lateral infarct with nancy-infarct ischemia December 2019  · Mild aortic stenosis with mild LV dysfunction on echocardiogram done December 2019, no gradient across the aortic valve on cardiac catheterization done 1/23/2020  · Renal insufficiency, managed by others  · Diabetes, managed by others  · History of carotid disease and left subclavian stenosis  · Dyslipidemia   · Hallucinations since last admission, managed by others  · IVP dye allergy with facial swelling about 30 years ago    PLAN:  1. Continue current cardiac medications. 2. We will continue to monitor patient and adjust medications on daily basis. Hoping for extubation later today. Above was discussed with patient's nurse. No family members were around when I saw him.   Savannah Verduzco MD

## 2020-01-25 NOTE — FLOWSHEET NOTE
01/25/20 1650   Provider Notification   Reason for Communication New orders; Review case   Provider Name Dr. Rebekah Sidhu   Provider Notification Physician   Method of Communication Call   Response See orders   Notification Time [phone]     Dr. eRbekah Sidhu notified that patient is extremely agitated on vent. Pt remains on CPAP but is unable to calm down with verbal redirection. Order received for precedex drip. Will continue to monitor.

## 2020-01-25 NOTE — PROGRESS NOTES
[address] Hospitalist        1/24/2020   7:16 PM    Name:  Spencer Mckeon  MRN:    [id number]     Acct:     [de-identified]   Room:  2027/2027-01   Day: 0     Admit Date: 1/22/2020  3:03 PM  PCP: Lacey Sainz MD    C/C:   Chief Complaint   Patient presents with    Chest Pain       Assessment:      · NSTEMI   · Coronary artery disease   · Positive cardiac stress test  · CABG planned 1/25  · Essential hypertension  · Kidney disease stage III  · Mixed hyperlipidemia  · Urge incontinence  · Benign prostatic hypertrophy  · Constipation  · Chronic obstructive pulmonary disease  · Chronic pain syndrome        Plan:        · Admit to progressive  · Monitor vitals closely  · SPO2 above 90%  · Pain control  · MS Contin every 12 hours  · Start aspirin  · Start Lipitor  · Resume Lopressor  · Serial cardiac enzymes  · Serial EKG  · Resume Ditropan  · Resume Flomax  · Resume Norvasc  · Oxygen PRN  · Nitro glycerin as needed  · Hold ACE inhibitor secondary to renal impairment  · Antiemetics Zofran as needed  · DuoNeb as needed  · RT eval  · Check CBC, BMP daily  · Check LFTs  · Get lipid panel  · Consult cardiology  · DVT and GI prophylaxis  · Cardiothoracic surgery consulted  · Nephrology consulted considering CKD 3 and need for IV dye, subsequent CABG  · Start Colace 100 mg p.o. twice daily  · Start senna 8.6 twice daily  · Patient says takes extended release morphine as needed-we will hold 1 dose, will need medication choice adjustment  · Fleet enema x1 today      Subjective:     Patient seen and examined at bedside. Patient's cardiac stress test was positive. Cardiothoracic surgery plan CABG    Says had small BM. Pt not satisfied      No acute complaints today. Afebrile    Pt. Denies any CP, SOB, palpitation, HA, dizziness, chills, cough, cold, changes in urination, or skin changes. ROS:  A 10 point system reviewed and negative otherwise mentioned above.         Physical Examination: Vitals:  /79   Pulse 81   Temp 98.1 °F (36.7 °C) (Temporal)   Resp 16   Ht 5' 7\" (1.702 m)   Wt 167 lb 6.4 oz (75.9 kg)   SpO2 95%   BMI 26.22 kg/m²   Temp (24hrs), Av.8 °F (36.6 °C), Min:97.6 °F (36.4 °C), Max:98.1 °F (36.7 °C)    Weight:   Wt Readings from Last 3 Encounters:   20 167 lb 6.4 oz (75.9 kg)   12/15/19 179 lb (81.2 kg)     I/O last 3 completed shifts:  I/O last 3 completed shifts: In: 56 [P.O.:480; I.V.:10]  Out: -      No results for input(s): POCGLU in the last 72 hours. General appearance - alert, well appearing, and in no acute distress  Mental status - oriented to person, place, and time with normal affect  Head - normocephalic and atraumatic  Eyes - pupils equal and reactive, extraocular eye movements intact, conjunctiva clear  Ears - hearing appears to be intact  Nose - no drainage noted  Mouth - mucous membranes moist  Neck - supple, no carotid bruits, thyroid not palpable  Chest - clear to auscultation, normal effort  Heart - normal rate, regular rhythm, systolic murmur  Abdomen - soft, nontender, nondistended, bowel sounds present all four quadrants, no masses, hepatomegaly or splenomegaly  Neurological - normal speech, no focal findings or movement disorder noted, cranial nerves II through XII grossly intact  Extremities - peripheral pulses palpable, no pedal edema or calf pain with palpation  Skin - no gross lesions, rashes, or induration noted        Medications: Allergies:    Allergies   Allergen Reactions    Iodinated Diagnostic Agents Anaphylaxis     FACE SWELLS UP       Current Meds:   Current Facility-Administered Medications:     0.9 % sodium chloride bolus, 20 mL, Intravenous, Once, Gilford Ivans, MD    sodium chloride flush 0.9 % injection 10 mL, 10 mL, Intravenous, 2 times per day, Radha Pratt MD, 10 mL at 20 0800    sodium chloride flush 0.9 % injection 10 mL, 10 mL, Intravenous, PRN, Radha Pratt MD    acetaminophen (TYLENOL) 33.0* 32.2* 34.9*   MCV 93.0 92.0 91.6   MCH 28.7 28.9 28.6   MCHC 30.9 31.4 31.2   RDW 13.9 13.9 13.8    207 232   MPV 10.2 10.0 10.7     Chemistry:  Recent Labs     01/22/20  1520  01/22/20  1920 01/22/20  2150 01/23/20  0140 01/23/20  0525 01/24/20  0334     --   --   --   --  141 139   K 4.5  --   --   --   --  4.7 5.3     --   --   --   --  107 106   CO2 23  --   --   --   --  25 22   GLUCOSE 212*  --   --   --   --  106* 243*   BUN 31*  --   --   --   --  33* 41*   CREATININE 2.25*  --   --   --   --  2.33* 2.32*   MG  --   --   --   --   --  1.7  --    ANIONGAP 11  --   --   --   --  9 11   LABGLOM 29*  --   --   --   --  28* 28*   GFRAA 35*  --   --   --   --  34* 34*   CALCIUM 9.2  --   --   --   --  9.2 9.2   PHOS  --   --   --   --   --   --  3.7   PROBNP 31,448*  --   --   --   --   --   --    TROPHS 112*   < > 108* 104* 101*  --   --    MYOGLOBIN 128*  --   --   --   --   --   --     < > = values in this interval not displayed. Recent Labs     01/23/20  0525   PROT 6.3*   LABALBU 3.3*   AST 13   ALT 7   ALKPHOS 49   BILITOT 0.31   CHOL 177   HDL 51   LDLCHOLESTEROL 108   CHOLHDLRATIO 3.5   TRIG 88   VLDL NOT REPORTED       Lab Results   Component Value Date/Time    SPECIAL NOT REPORTED 12/27/2013 10:38 AM     Lab Results   Component Value Date/Time    CULTURE NO SIGNIFICANT GROWTH 12/27/2013 10:38 AM    CULTURE  12/27/2013 10:38 AM     Performed at [address] [facility] [phone]       No results found for: POCPH, PHART, PH, POCPCO2, MLB3QZE, PCO2, POCPO2, PO2ART, PO2, POCHCO3, YMH7EHS, HCO3, NBEA, PBEA, BEART, BE, THGBART, THB, ESV7IAP, HWLC4DGO, Y5GJKNIZ, O2SAT, FIO2    Radiology:    Xr Chest Portable    Result Date: 1/22/2020  Mild bilateral pulmonary congestion.          All radiological studies reviewed  Code Status:  Full Code        Electronically signed by Rachel Chahal MD on 1/24/2020 at 7:16 PM    This note was created with the assistance of a speech-recognition program.  Although the intention is to generate a document that actually reflects the content of the visit, no guarantees can be provided that every mistake has been identified and corrected by editing. Note was updated later by me after  physical examination and  completion of the assessment.

## 2020-01-25 NOTE — PLAN OF CARE
On call to OR. AL questions answered and consent signed. I expressed my only two concerns as follows:    1) lack of targets for bypass. The LAD is okay. I am thinking that I can get the ramus but it is occluded and its not that large. There are no targets in the circ distribution whatsoever and the RCA is a small non dominant vessel with multiple segmental lesions and poor runoff. It is remarkable that his EF is fairly well preserved so that if I can get the anterior-lateral wall we should be able to extend benefit. 2) Kidneys. Dialysis may become a reality. We will filter on CPB and be prepared for dilaysis in the post op period. Renal is 100% on board.     Maria Grey MD

## 2020-01-26 ENCOUNTER — APPOINTMENT (OUTPATIENT)
Dept: GENERAL RADIOLOGY | Age: 70
DRG: 234 | End: 2020-01-26
Payer: MEDICARE

## 2020-01-26 LAB
ANION GAP SERPL CALCULATED.3IONS-SCNC: 12 MMOL/L (ref 9–17)
BUN BLDV-MCNC: 44 MG/DL (ref 8–23)
BUN/CREAT BLD: 18 (ref 9–20)
CALCIUM SERPL-MCNC: 8.3 MG/DL (ref 8.6–10.4)
CHLORIDE BLD-SCNC: 113 MMOL/L (ref 98–107)
CO2: 19 MMOL/L (ref 20–31)
CREAT SERPL-MCNC: 2.42 MG/DL (ref 0.7–1.2)
GFR AFRICAN AMERICAN: 32 ML/MIN
GFR NON-AFRICAN AMERICAN: 27 ML/MIN
GFR SERPL CREATININE-BSD FRML MDRD: ABNORMAL ML/MIN/{1.73_M2}
GFR SERPL CREATININE-BSD FRML MDRD: ABNORMAL ML/MIN/{1.73_M2}
GLUCOSE BLD-MCNC: 102 MG/DL (ref 75–110)
GLUCOSE BLD-MCNC: 105 MG/DL (ref 75–110)
GLUCOSE BLD-MCNC: 106 MG/DL (ref 75–110)
GLUCOSE BLD-MCNC: 107 MG/DL (ref 75–110)
GLUCOSE BLD-MCNC: 108 MG/DL (ref 75–110)
GLUCOSE BLD-MCNC: 112 MG/DL (ref 70–99)
GLUCOSE BLD-MCNC: 175 MG/DL (ref 75–110)
GLUCOSE BLD-MCNC: 76 MG/DL (ref 75–110)
GLUCOSE BLD-MCNC: 77 MG/DL (ref 75–110)
GLUCOSE BLD-MCNC: 81 MG/DL (ref 75–110)
GLUCOSE BLD-MCNC: 99 MG/DL (ref 75–110)
HCT VFR BLD CALC: 31.3 % (ref 40.7–50.3)
HEMOGLOBIN: 9.7 G/DL (ref 13–17)
INR BLD: 1.1
MAGNESIUM: 2.3 MG/DL (ref 1.6–2.6)
MCH RBC QN AUTO: 29 PG (ref 25.2–33.5)
MCHC RBC AUTO-ENTMCNC: 31 G/DL (ref 28.4–34.8)
MCV RBC AUTO: 93.4 FL (ref 82.6–102.9)
NRBC AUTOMATED: 0 PER 100 WBC
PDW BLD-RTO: 14.9 % (ref 11.8–14.4)
PHOSPHORUS: 5.4 MG/DL (ref 2.5–4.5)
PLATELET # BLD: 98 K/UL (ref 138–453)
PMV BLD AUTO: 11.3 FL (ref 8.1–13.5)
POTASSIUM SERPL-SCNC: 4.6 MMOL/L (ref 3.7–5.3)
PROTHROMBIN TIME: 11.1 SEC (ref 9.7–11.6)
RBC # BLD: 3.35 M/UL (ref 4.21–5.77)
SODIUM BLD-SCNC: 144 MMOL/L (ref 135–144)
WBC # BLD: 11.4 K/UL (ref 3.5–11.3)

## 2020-01-26 PROCEDURE — 71045 X-RAY EXAM CHEST 1 VIEW: CPT

## 2020-01-26 PROCEDURE — 83735 ASSAY OF MAGNESIUM: CPT

## 2020-01-26 PROCEDURE — 2700000000 HC OXYGEN THERAPY PER DAY

## 2020-01-26 PROCEDURE — 97110 THERAPEUTIC EXERCISES: CPT

## 2020-01-26 PROCEDURE — 6360000002 HC RX W HCPCS: Performed by: THORACIC SURGERY (CARDIOTHORACIC VASCULAR SURGERY)

## 2020-01-26 PROCEDURE — 80048 BASIC METABOLIC PNL TOTAL CA: CPT

## 2020-01-26 PROCEDURE — 85027 COMPLETE CBC AUTOMATED: CPT

## 2020-01-26 PROCEDURE — 97116 GAIT TRAINING THERAPY: CPT

## 2020-01-26 PROCEDURE — 6370000000 HC RX 637 (ALT 250 FOR IP): Performed by: THORACIC SURGERY (CARDIOTHORACIC VASCULAR SURGERY)

## 2020-01-26 PROCEDURE — 84100 ASSAY OF PHOSPHORUS: CPT

## 2020-01-26 PROCEDURE — 85610 PROTHROMBIN TIME: CPT

## 2020-01-26 PROCEDURE — C9113 INJ PANTOPRAZOLE SODIUM, VIA: HCPCS | Performed by: THORACIC SURGERY (CARDIOTHORACIC VASCULAR SURGERY)

## 2020-01-26 PROCEDURE — 2580000003 HC RX 258: Performed by: THORACIC SURGERY (CARDIOTHORACIC VASCULAR SURGERY)

## 2020-01-26 PROCEDURE — 97530 THERAPEUTIC ACTIVITIES: CPT

## 2020-01-26 PROCEDURE — 97163 PT EVAL HIGH COMPLEX 45 MIN: CPT

## 2020-01-26 PROCEDURE — 6370000000 HC RX 637 (ALT 250 FOR IP): Performed by: FAMILY MEDICINE

## 2020-01-26 PROCEDURE — 6370000000 HC RX 637 (ALT 250 FOR IP): Performed by: INTERNAL MEDICINE

## 2020-01-26 PROCEDURE — 2000000000 HC ICU R&B

## 2020-01-26 PROCEDURE — P9041 ALBUMIN (HUMAN),5%, 50ML: HCPCS | Performed by: THORACIC SURGERY (CARDIOTHORACIC VASCULAR SURGERY)

## 2020-01-26 PROCEDURE — 94640 AIRWAY INHALATION TREATMENT: CPT

## 2020-01-26 PROCEDURE — 94669 MECHANICAL CHEST WALL OSCILL: CPT

## 2020-01-26 RX ORDER — OXYCODONE HYDROCHLORIDE AND ACETAMINOPHEN 5; 325 MG/1; MG/1
1 TABLET ORAL EVERY 4 HOURS PRN
Status: DISCONTINUED | OUTPATIENT
Start: 2020-01-26 | End: 2020-01-26 | Stop reason: SDUPTHER

## 2020-01-26 RX ORDER — SODIUM BICARBONATE 650 MG/1
1300 TABLET ORAL 2 TIMES DAILY
Status: DISCONTINUED | OUTPATIENT
Start: 2020-01-26 | End: 2020-01-30

## 2020-01-26 RX ORDER — LUBIPROSTONE 8 UG/1
8 CAPSULE, GELATIN COATED ORAL 2 TIMES DAILY WITH MEALS
Status: DISCONTINUED | OUTPATIENT
Start: 2020-01-26 | End: 2020-01-30 | Stop reason: HOSPADM

## 2020-01-26 RX ORDER — OXYCODONE HYDROCHLORIDE AND ACETAMINOPHEN 5; 325 MG/1; MG/1
2 TABLET ORAL EVERY 4 HOURS PRN
Status: DISCONTINUED | OUTPATIENT
Start: 2020-01-26 | End: 2020-01-26 | Stop reason: SDUPTHER

## 2020-01-26 RX ORDER — OXYCODONE HYDROCHLORIDE AND ACETAMINOPHEN 5; 325 MG/1; MG/1
2 TABLET ORAL EVERY 4 HOURS PRN
Status: DISCONTINUED | OUTPATIENT
Start: 2020-01-26 | End: 2020-01-30 | Stop reason: HOSPADM

## 2020-01-26 RX ORDER — MORPHINE SULFATE 15 MG/1
15 TABLET, FILM COATED, EXTENDED RELEASE ORAL EVERY 12 HOURS SCHEDULED
Status: DISCONTINUED | OUTPATIENT
Start: 2020-01-26 | End: 2020-01-26

## 2020-01-26 RX ORDER — ATORVASTATIN CALCIUM 40 MG/1
40 TABLET, FILM COATED ORAL NIGHTLY
Status: DISCONTINUED | OUTPATIENT
Start: 2020-01-27 | End: 2020-01-30 | Stop reason: HOSPADM

## 2020-01-26 RX ORDER — OXYCODONE HYDROCHLORIDE AND ACETAMINOPHEN 5; 325 MG/1; MG/1
1 TABLET ORAL EVERY 4 HOURS PRN
Status: DISCONTINUED | OUTPATIENT
Start: 2020-01-26 | End: 2020-01-30 | Stop reason: HOSPADM

## 2020-01-26 RX ADMIN — ASPIRIN 81 MG: 81 TABLET, COATED ORAL at 07:49

## 2020-01-26 RX ADMIN — DEXTROSE MONOHYDRATE 2 G: 50 INJECTION, SOLUTION INTRAVENOUS at 06:00

## 2020-01-26 RX ADMIN — OXYCODONE AND ACETAMINOPHEN 2 TABLET: 5; 325 TABLET ORAL at 15:21

## 2020-01-26 RX ADMIN — SENNOSIDES 8.6 MG: 8.6 TABLET, FILM COATED ORAL at 20:20

## 2020-01-26 RX ADMIN — Medication 10 ML: at 08:04

## 2020-01-26 RX ADMIN — ONDANSETRON 4 MG: 2 INJECTION INTRAMUSCULAR; INTRAVENOUS at 07:48

## 2020-01-26 RX ADMIN — CLOPIDOGREL BISULFATE 75 MG: 75 TABLET ORAL at 07:49

## 2020-01-26 RX ADMIN — FENTANYL CITRATE 50 MCG: 50 INJECTION, SOLUTION INTRAMUSCULAR; INTRAVENOUS at 04:25

## 2020-01-26 RX ADMIN — MORPHINE SULFATE 30 MG: 15 TABLET, FILM COATED, EXTENDED RELEASE ORAL at 07:50

## 2020-01-26 RX ADMIN — OXYCODONE AND ACETAMINOPHEN 2 TABLET: 5; 325 TABLET ORAL at 19:37

## 2020-01-26 RX ADMIN — DOCUSATE SODIUM 100 MG: 100 CAPSULE, LIQUID FILLED ORAL at 20:20

## 2020-01-26 RX ADMIN — HYDROCODONE BITARTRATE AND ACETAMINOPHEN 2 TABLET: 5; 325 TABLET ORAL at 09:18

## 2020-01-26 RX ADMIN — LUBIPROSTONE 8 MCG: 8 CAPSULE, GELATIN COATED ORAL at 17:38

## 2020-01-26 RX ADMIN — ONDANSETRON 4 MG: 2 INJECTION INTRAMUSCULAR; INTRAVENOUS at 08:00

## 2020-01-26 RX ADMIN — TAMSULOSIN HYDROCHLORIDE 0.4 MG: 0.4 CAPSULE ORAL at 09:18

## 2020-01-26 RX ADMIN — DEXTROSE MONOHYDRATE 2 G: 50 INJECTION, SOLUTION INTRAVENOUS at 15:20

## 2020-01-26 RX ADMIN — FENTANYL CITRATE 50 MCG: 50 INJECTION, SOLUTION INTRAMUSCULAR; INTRAVENOUS at 07:48

## 2020-01-26 RX ADMIN — DOCUSATE SODIUM 100 MG: 100 CAPSULE, LIQUID FILLED ORAL at 07:50

## 2020-01-26 RX ADMIN — SODIUM BICARBONATE 650 MG: 650 TABLET ORAL at 07:49

## 2020-01-26 RX ADMIN — HYDROCODONE BITARTRATE AND ACETAMINOPHEN 2 TABLET: 5; 325 TABLET ORAL at 02:20

## 2020-01-26 RX ADMIN — PANTOPRAZOLE SODIUM 40 MG: 40 INJECTION, POWDER, FOR SOLUTION INTRAVENOUS at 07:48

## 2020-01-26 RX ADMIN — SODIUM BICARBONATE 1300 MG: 650 TABLET ORAL at 20:20

## 2020-01-26 RX ADMIN — OXYBUTYNIN CHLORIDE 5 MG: 5 TABLET ORAL at 14:00

## 2020-01-26 RX ADMIN — FENTANYL CITRATE 25 MCG: 50 INJECTION, SOLUTION INTRAMUSCULAR; INTRAVENOUS at 12:08

## 2020-01-26 RX ADMIN — OXYBUTYNIN CHLORIDE 5 MG: 5 TABLET ORAL at 20:20

## 2020-01-26 RX ADMIN — DEXTROSE MONOHYDRATE 2 G: 50 INJECTION, SOLUTION INTRAVENOUS at 23:59

## 2020-01-26 RX ADMIN — ALBUMIN (HUMAN) 25 G: 12.5 INJECTION, SOLUTION INTRAVENOUS at 13:20

## 2020-01-26 RX ADMIN — OXYBUTYNIN CHLORIDE 5 MG: 5 TABLET ORAL at 07:49

## 2020-01-26 ASSESSMENT — PAIN DESCRIPTION - LOCATION
LOCATION: ABDOMEN
LOCATION: ABDOMEN;CHEST
LOCATION: ABDOMEN
LOCATION: ABDOMEN;OTHER (COMMENT)
LOCATION: ABDOMEN

## 2020-01-26 ASSESSMENT — PAIN DESCRIPTION - PROGRESSION
CLINICAL_PROGRESSION: NOT CHANGED

## 2020-01-26 ASSESSMENT — PAIN DESCRIPTION - PAIN TYPE
TYPE: SURGICAL PAIN;ACUTE PAIN
TYPE: ACUTE PAIN
TYPE: SURGICAL PAIN

## 2020-01-26 ASSESSMENT — PAIN SCALES - GENERAL
PAINLEVEL_OUTOF10: 10
PAINLEVEL_OUTOF10: 7
PAINLEVEL_OUTOF10: 9
PAINLEVEL_OUTOF10: 10
PAINLEVEL_OUTOF10: 9
PAINLEVEL_OUTOF10: 10

## 2020-01-26 ASSESSMENT — PAIN DESCRIPTION - ORIENTATION
ORIENTATION: MID
ORIENTATION: UPPER;MID
ORIENTATION: UPPER;MID
ORIENTATION: MID

## 2020-01-26 ASSESSMENT — PAIN - FUNCTIONAL ASSESSMENT
PAIN_FUNCTIONAL_ASSESSMENT: PREVENTS OR INTERFERES WITH MANY ACTIVE NOT PASSIVE ACTIVITIES

## 2020-01-26 NOTE — FLOWSHEET NOTE
Patient was resting in bedside chair.  shared silent prayer.    leaves prayer card for possible follow up.     01/26/20 5870   Encounter Summary   Services provided to: Patient   Referral/Consult From: [facility]   (1-26-20)   Complexity of Encounter Low   Length of Encounter 15 minutes   Routine   Type Follow up   Assessment Sleeping   Intervention Prayer

## 2020-01-26 NOTE — PROGRESS NOTES
Order obtain for extubation. SpO2 of 100 on 30% FiO2. Patient extubated and placed on 4 liters/min via nasal cannula. Post extubation SpO2 is 98% with HR  66 bpm and RR 18 breaths/min. Patient had strong cough that was non-productive. Extubation Well tolerated by patient. .   Breath Sounds: Clear, crackles in bases    [address] [facility]   8:49 PM

## 2020-01-26 NOTE — FLOWSHEET NOTE
01/25/20 2045   Provider Notification   Reason for Communication Review case   Provider Name Dr Myra Mcallister   Provider Notification Physician   Method of Communication Call   Response No new orders   Dr Negro Thomas was called and informed about the patient being extubate. Patient is doing well on NC and will continue to monitor throughout the shift.

## 2020-01-26 NOTE — PROGRESS NOTES
Physical Therapy    Facility/Department: [facility] CV[facility]  Initial Assessment    NAME: Eugenia Gramajo  : 1950  MRN: [id number]    Date of Service: 2020  Pt underwent CABG x 2 2020 and recently extubated. Pt would benefit from Cardiac rehab stay once medically stable vs SNF placement. Pt has limited social support with sister 40 miles away and no other support available locally pt pt report. Discharge Recommendations:  IP Rehab, Subacute/Skilled Nursing Facility(Cardiac rehab - reports his plan is to \"do cardiac rehab from home\" and \"with visting angels or something\" but also stating his \"house is a mess\" so he \"doesn't want anybody to come in\")   PT Equipment Recommendations  Equipment Needed: (TBD based on DC location and timing)    Assessment   Body structures, Functions, Activity limitations: Decreased functional mobility ; Decreased strength;Decreased endurance;Decreased vision/visual deficit; Decreased coordination;Decreased ADL status; Decreased sensation;Decreased ROM; Decreased balance; Increased pain  Prognosis: Good  Decision Making: High Complexity  History: lives alone, recent CABG x 2, high pain level  Exam: LE ROM, strength, bed mobility, transfers, gait, balance  Clinical Presentation: evolving due to recent CABG  PT Education: Goals;Precautions; Functional Mobility Training;PT Role;Transfer Training; Adaptive Device Training;Plan of Care;Energy Conservation;Equipment;Gait Training;General Safety; Disease Specific Education  Patient Education: Pt educated in sternal precautions and heart hugger use - needs follow up training  Barriers to Learning: pt can be self-limiting stating things are \"impossible\" even after completing them and \"I don't know how I'm ever going to get better\"  REQUIRES PT FOLLOW UP: Yes  Activity Tolerance  Activity Tolerance: Patient limited by pain;Treatment limited secondary to medical complications (free text)       Patient Diagnosis(es): The encounter diagnosis was Chest pain, unspecified type. has a past medical history of Back pain, Diabetes mellitus ([address] and Kidney stone. has a past surgical history that includes back surgery; Ureter stent placement; Cystocopy (12/06/2019); and Cystoscopy (N/A, 12/6/2019). Restrictions  Restrictions/Precautions  Restrictions/Precautions: Surgical Protocols, Cardiac, General Precautions, Fall Risk, Up as Tolerated  Required Braces or Orthoses?: No  Position Activity Restriction  Sternal Precautions: No Pushing, No Pulling(NO lifting, pushing, pulling)  Sternal Precautions: post-op CABG x 2 - heart hugger on at all times - pt needs reinforcement on when/how to effectivily use  Vision/Hearing  Vision: Within Functional Limits(lasik eye surgery 15 yrs ago - no glasses, but often sees floaters)  Hearing: Within functional limits     Subjective  General  Chart Reviewed: Yes  Patient assessed for rehabilitation services?: Yes  Response To Previous Treatment: Not applicable  Family / Caregiver Present: No  Follows Commands: Within Functional Limits(Pt needed additional time and encouragement to participate due to \"severe\" chest pain and fear of moving)  Pain Screening  Patient Currently in Pain: Yes  Pain Assessment  Pain Assessment: 0-10  Pain Level: 9  Pain Type: Surgical pain;Acute pain  Pain Location: Abdomen; Chest  Pain Orientation: Upper;Mid  Vital Signs  Patient Currently in Pain: Yes       Orientation  Orientation  Overall Orientation Status: Within Functional Limits  Social/Functional History  Social/Functional History  Lives With: Alone(closest family member/ person that could assist pt (pt's sister) lives 36 miles away.  Pt denies any other social support.)  Type of Home: House  Home Layout: One level  Home Access: Stairs to enter with rails  Entrance Stairs - Number of Steps: 4  Entrance Stairs - Rails: Both  Bathroom Shower/Tub: Tub/Shower unit  Bathroom Toilet: Standard  Bathroom Equipment: (No DME needed previously)  Arthur Accessibility: Accessible  Home Equipment: (No DME needed previously)  ADL Assistance: [address]: Independent  Homemaking Responsibilities: Yes  Ambulation Assistance: Independent(Amb without a device for community distances. )  Transfer Assistance: Independent  Active : Yes  Mode of Transportation: Car  Occupation: Retired(x 15 yrs)  Cognition        Objective     Observation/Palpation  Posture: Good    AROM RLE (degrees)  RLE AROM: WFL  AROM LLE (degrees)  LLE AROM : WFL  Strength RLE  Comment: LEISA LANDEROS's grossly 4/5  Tone RLE  RLE Tone: Normotonic  Tone LLE  LLE Tone: Normotonic  Motor Control  Gross Motor?: WFL  Sensation  Overall Sensation Status: Impaired  Light Touch: Partial deficits in the RUE;Partial deficits in the LUE;Partial deficits in the RLE;Partial deficits in the LLE  Bed mobility  Bridging: (Pt up in chair - nsg requested he stay up in chair - nsg reports 1 person assist to get out of bed while managing lines and tubes. )  Transfers  Sit to Stand: Minimal Assistance(Increased time to encourage pt to attempt standing and VI on use of heart hugger and UE placement for effective push off while following sternal precautions - needs follow up)  Stand to sit: Minimal Assistance  Stand Pivot Transfers: Minimal Assistance  Ambulation  Ambulation?: Yes  Ambulation 1  Surface: level tile  Device: Rolling Walker  Other Apparatus: (art line, chest tubes, IV, catherer, O2 3L)  Assistance: Minimal assistance  Gait Deviations: Increased SILVER; Slow Jerilyn; Shuffles  Distance: 3 feet forward/backward  Comments: Pt kept stating, \"That's it, I'm going to sit down. \" Needed VC's for finding chair before attempting to sit and assist to manage lines/tubes.   Stairs/Curb  Stairs?: No     Balance  Posture: Good  Sitting - Static: Fair;+  Sitting - Dynamic: Fair;+  Standing - Static: Fair;-  Standing - Dynamic: Fair;-  Exercises  Comments: Pt instructed in LEISA LANDEROS seated leg program x 10 reps in all

## 2020-01-26 NOTE — PROGRESS NOTES
Nephrology progress note    Reason for Consult:  Elevated creatinine    Requesting Physician:  Haylie Gallegos MD    Interval history:  Creatinine stable at baseline   no new complaints  CXR continues to show pulmonary congestion. S/p CABG x2 POD1    HISTORY OF PRESENT ILLNESS:    The patient is a 77 y.o. male who presents with chest pain, unstable angina, NSTEMI. He underwent cardiac cath earlier today, which showed severe multiple vessel CAD. Received 60mL of contrast.  Underlying CKD 3 with baseline creatinine around 2. Creatinine 2.25 yesterday and 2.33 this am. Electrolytes ok. Admitted last month for obstructing right ureteric calculus and hydronephrosis. Pt underwent cysto, pyelogram and removal of nephrolith at that time. SBP trending 120-150s./. Hx of HTN. Home meds include norvasc, metoprolol. However, he states he had stopped taking multiple medications in recently due to c/o lightheadedness, dizziness, and feeling that they were making him more confused. He states he does not know which medications he has been taking recently. He denies CP, SOB, fever currently. C/o always feeling cold, but denies chills. Denies edema. Prior to Admission medications    Medication Sig Start Date End Date Taking? Authorizing Provider   psyllium (KONSYL) 28.3 % PACK Take 1 packet by mouth daily   Yes Historical Provider, MD   aspirin 81 MG chewable tablet Take 1 tablet by mouth daily 12/16/19  Yes Haylie Gallegos MD   albuterol-ipratropium (COMBIVENT RESPIMAT)  MCG/ACT AERS inhaler Inhale 1 puff into the lungs every 6 hours 12/13/19  Yes Haylie Gallegos MD   morphine (MS CONTIN) 15 MG extended release tablet Take 15 mg by mouth 2 times daily as needed for Pain.   11/7/19  Yes Miguelina L Ice   sodium bicarbonate 650 MG tablet Take 1 tablet by mouth 2 times daily 12/15/19   Haylie Gallegos MD   oxybutynin (DITROPAN) 5 MG tablet Take 1 tablet by mouth 3 times daily 12/15/19   Haylie Gallegos MD   atorvastatin (LIPITOR) 20 MG tablet Take 1 tablet by mouth nightly 12/13/19   Balbir Johnson MD   tamsulosin (FLOMAX) 0.4 MG capsule Take 1 capsule by mouth daily 12/14/19   Brittni Elam MD   metoprolol tartrate (LOPRESSOR) 50 MG tablet Take 1 tablet by mouth 2 times daily 12/13/19   Brittni Elam MD   amLODIPine (NORVASC) 10 MG tablet Take 1 tablet by mouth daily 12/14/19   Brittni Elam MD       Scheduled Meds:   [START ON 1/27/2020] metoprolol tartrate  12.5 mg Oral BID    [START ON 1/27/2020] atorvastatin  40 mg Oral Nightly    lubiprostone  8 mcg Oral BID WC    sodium chloride flush  10 mL Intravenous 2 times per day    ceFAZolin (ANCEF) IVPB  2 g Intravenous Q8H    pantoprazole  40 mg Intravenous Daily    And    sodium chloride (PF)  10 mL Intravenous Daily    aspirin  81 mg Oral Daily    clopidogrel  75 mg Oral Daily    docusate sodium  100 mg Oral BID    insulin lispro  0-12 Units Subcutaneous TID WC    insulin lispro  0-6 Units Subcutaneous Nightly    senna  1 tablet Oral BID    oxybutynin  5 mg Oral TID    sodium bicarbonate  650 mg Oral BID    tamsulosin  0.4 mg Oral Daily     Physical Exam:  Vitals:    01/26/20 1400 01/26/20 1500 01/26/20 1600 01/26/20 1700   BP: 84/64 102/68 91/66 99/66   Pulse: 75 73 76 75   Resp: 16 16 16 16   Temp:   97.7 °F (36.5 °C)    TempSrc:   Temporal    SpO2: 95% 96% 92% 95%   Weight:       Height:         I/O last 3 completed shifts: In: 2031.2 [P.O.:50; I.V.:1981.2]  Out: 4370 [Urine:845; Drains:500]      General:  Awake, alert, not in distress. Appears to be stated age. HEENT: Atraumatic, normocephalic. Anicteric sclera. Pink and moist oral mucosa. Neck supple. No JVD. Chest: Bilateral air entry, clear to auscultation, no wheezing, rhonchi or rales. Cardiovascular: RRR, S1S2, no murmur, rub or gallop. No lower extremity edema. Abdomen: Soft, non tender to palpation. Musculoskeletal:    No cyanosis or clubbing. Integumentary: Pink, warm and dry.  Free from rash or lesions. Skin turgor normal.  CNS: Oriented to person, place and time. Speech clear. Face symmetrical. No tremor.      Data:    CBC:   Lab Results   Component Value Date    WBC 11.4 (H) 01/26/2020    HGB 9.7 (L) 01/26/2020    HCT 31.3 (L) 01/26/2020    MCV 93.4 01/26/2020    PLT 98 (L) 01/26/2020     BMP:    Lab Results   Component Value Date     01/26/2020     01/25/2020     01/25/2020    K 4.6 01/26/2020    K 4.5 01/25/2020    K 3.9 01/25/2020     (H) 01/26/2020     (H) 01/25/2020     (H) 01/25/2020    CO2 19 (L) 01/26/2020    CO2 18 (L) 01/25/2020    CO2 19 (L) 01/25/2020    BUN 44 (H) 01/26/2020    BUN 43 (H) 01/25/2020    BUN 43 (H) 01/25/2020    CREATININE 2.42 (H) 01/26/2020    CREATININE 2.35 (H) 01/25/2020    CREATININE 2.19 (H) 01/25/2020    GLUCOSE 112 (H) 01/26/2020    GLUCOSE 101 (H) 01/25/2020    GLUCOSE 115 (H) 01/25/2020     CMP:   Lab Results   Component Value Date     01/26/2020    K 4.6 01/26/2020     01/26/2020    CO2 19 01/26/2020    BUN 44 01/26/2020    CREATININE 2.42 01/26/2020    GLUCOSE 112 01/26/2020    CALCIUM 8.3 01/26/2020    PROT 6.3 01/23/2020    LABALBU 3.3 01/23/2020    BILITOT 0.31 01/23/2020    ALKPHOS 49 01/23/2020    AST 13 01/23/2020    ALT 7 01/23/2020      Hepatic:   Lab Results   Component Value Date    AST 13 01/23/2020    AST 16 12/06/2019    ALT 7 01/23/2020    ALT 9 12/06/2019    BILITOT 0.31 01/23/2020    BILITOT 0.31 12/06/2019    ALKPHOS 49 01/23/2020    ALKPHOS 47 12/06/2019     BNP: No results found for: BNP  Lipids:   Lab Results   Component Value Date    CHOL 177 01/23/2020    HDL 51 01/23/2020     INR:   Lab Results   Component Value Date    INR 1.1 01/26/2020    INR 1.2 01/25/2020    INR 0.9 01/25/2020     PTH: No results found for: PTH  Phosphorus:    Lab Results   Component Value Date    PHOS 5.4 01/26/2020     Ionized Calcium: No results found for: IONCA  Magnesium:   Lab Results   Component Value Date

## 2020-01-26 NOTE — PROGRESS NOTES
tamsulosin  0.4 mg Oral Daily    [MAR Hold] morphine  30 mg Oral Daily       IV Infusions (if any):   norepinephrine      EPINEPHrine infusion      nitroGLYCERIN      insulin 0.39 Units/hr (01/26/20 0600)    dextrose      propofol Stopped (01/25/20 2006)    dexmedetomidine (PRECEDEX) IV infusion 0.5 mcg/kg/hr (01/25/20 1958)       Diagnostics:   Telemetry: Sinus rhythm    Labs:   CBC:  Recent Labs     01/25/20  1308 01/26/20  0400   WBC 11.4* 11.4*   HGB 10.0* 9.7*   HCT 31.8* 31.3*   * 98*     Magnesium:  Recent Labs     01/25/20  1308 01/26/20  0400   MG 2.6 2.3     BMP:  Recent Labs     01/25/20 2030 01/26/20  0400    144   K 4.5 4.6   CALCIUM 8.0* 8.3*   CO2 18* 19*   BUN 43* 44*   CREATININE 2.35* 2.42*   LABGLOM 28* 27*   GLUCOSE 101* 112*     BNP:  No results for input(s): BNP, PROBNP in the last 72 hours. PT/INR:  Recent Labs     01/25/20  1308 01/26/20  0400   PROTIME 12.2* 11.1   INR 1.2 1.1     APTT:  Recent Labs     01/25/20  0442 01/25/20  1308   APTT 20.4* 28.8     CARDIAC ENZYMES:  No results for input(s): MYOGLOBIN, CKTOTAL, CKMB, CKMBINDEX, TROPHS, TROPONINT in the last 72 hours. FASTING LIPID PANEL:  Lab Results   Component Value Date    HDL 51 01/23/2020    TRIG 88 01/23/2020     LIVER PROFILE:  No results for input(s): AST, ALT, LABALBU, ALKPHOS, BILITOT, BILIDIR, IBILI, PROT, GLOB, ALBUMIN in the last 72 hours.      ASSESSMENT:    · NSTEMI with unstable angina s/p post two-vessel coronary artery bypass graft surgery with LIMA to the LAD and saphenous vein graft to the ramus 1/25/2020-stable  · CAD with severe multivessel CAD and transradial cardiac catheterization 1/23/2020  · Mild aortic stenosis with mild LV dysfunction on echocardiogram done December 2019, no gradient across the aortic valve on cardiac catheterization done 1/23/2020  · Renal insufficiency, managed by others  · Diabetes, managed by others  · History of carotid disease and left subclavian

## 2020-01-26 NOTE — PROGRESS NOTES
[address] Hospitalist        1/26/2020   11:25 AM    Name:  Arash Mathews  MRN:    [id number]     Acct:     [de-identified]   Room:  2030/2030-01  IP Day: 2     Admit Date: 1/22/2020  3:03 PM  PCP: Luis Forrester MD    C/C:   Chief Complaint   Patient presents with    Chest Pain       Assessment:      · Coronary artery bypass graft - 2 vessel 1/25/20  · NSTEMI / Unstable Angina   · Coronary artery disease   · Positive cardiac stress test  · S/p Intubation 1/25/20, extubated by PM  · Essential hypertension  · Kidney disease stage III  · Mixed hyperlipidemia  · Urge incontinence  · Benign prostatic hypertrophy  · Constipation  · Chronic obstructive pulmonary disease  · Chronic pain syndrome  · Anxiety disorder        Plan:        · Admit to progressive- > CVTU ICU  · Monitor vitals closely  · SPO2 above 90%  · Pain control  · MS Contin every 12 hours- PRN pain  · Start aspirin  · Start Lipitor  · Resume Lopressor  · Resume Ditropan  · Resume Flomax  · Resume Norvasc  · Oxygen PRN  · Nitro glycerin as needed  · Hold ACE inhibitor secondary to renal impairment  · Antiemetics Zofran as needed  · DuoNeb as needed  · RT eval  · Check CBC, BMP daily  · Check LFTs  · Get lipid panel  · Consult cardiology  · DVT and GI prophylaxis  · Cardiothoracic surgery on board  · Nephrology consulted considering CKD 3 and need for IV dye, subsequent CABG  · Start Colace 100 mg p.o. twice daily  · Start senna 8.6 twice daily  · Patient says takes extended release morphine as needed-we will hold 1 dose, will need medication choice adjustment  · Fleet enema x1 today  · Did not need to be on pressors   · Sedated w Propofol, weaning  · Ativan IV prn      Subjective:     Patient seen and examined at bedside.     PT working w pt  C/o Chest wall pain    Has had chronic pain issues  However has taken MS Contin only once a day  Refuses to take BID  Says gets 'bind up'    other options discussed  Also discussed need for better control kalen w CABG    Fentanyl TD  However BP systolic 51E sec to Fentanyl 1V  Needed fluids  Still low after a couple of hrs  Norco not effective    Will hold off for now  Start Percocet 10 mg Q4h prn pain mod severe  Start Amitiza 8mcg bid  Cont colace 100bid  Cont Senna bid  Hold if has more than 2 BMs per day      ROS:  A 10 point system reviewed        Physical Examination:      Vitals:  BP 81/61   Pulse 67   Temp 97.9 °F (36.6 °C) (Temporal)   Resp 16   Ht 5' 7\" (1.702 m)   Wt 167 lb 6.4 oz (75.9 kg)   SpO2 99%   BMI 26.22 kg/m²   Temp (24hrs), Av.8 °F (36 °C), Min:77.5 °F (25.3 °C), Max:99.7 °F (37.6 °C)    Weight:   Wt Readings from Last 3 Encounters:   20 167 lb 6.4 oz (75.9 kg)   12/15/19 179 lb (81.2 kg)     I/O last 3 completed shifts:  I/O last 3 completed shifts: In: 3031.2 [P.O.:50; I.V.:1981.2; Blood:1000]  Out: 7820 [KUIOD:4944; Drains:640; Blood:450]     Recent Labs     20  0404 20  0505 20  0607 20  0835   POCGLU 108 106 99 107         General appearance - well appearing, and in acute distress sec to pain  Mental status - alert and oriented  Head - normocephalic and atraumatic  Eyes - pupils equal and reactive, extraocular eye movements intact, conjunctiva clear  Ears - hearing appears to be intact  Nose - no drainage noted  Mouth - mucous membranes moist  Neck - supple, no carotid bruits, thyroid not palpable  Chest - clear to auscultation, normal effort  Heart - normal rate, regular rhythm, systolic murmur  Abdomen - soft, nontender, nondistended, bowel sounds present all four quadrants, no masses, hepatomegaly or splenomegaly  Neurological - AO3, able to move all limbs  Extremities - peripheral pulses palpable, no pedal edema or calf pain with palpation  Skin - no gross lesions, rashes, or induration noted        Medications: Allergies:    Allergies   Allergen Reactions    Iodinated Diagnostic Agents Anaphylaxis     FACE SWELLS UP Current Meds:   Current Facility-Administered Medications:     [START ON 1/27/2020] metoprolol tartrate (LOPRESSOR) tablet 12.5 mg, 12.5 mg, Oral, BID, Ravindra Keith MD  [place]  [START ON 1/27/2020] atorvastatin (LIPITOR) tablet 40 mg, 40 mg, Oral, Nightly, Ravindra Keith MD    norepinephrine (LEVOPHED) 16 mg in dextrose 5 % 250 mL infusion, 0.01 mcg/kg/min, Intravenous, Continuous PRN, Ravindra Keith MD    EPINEPHrine (EPINEPHrine HCL) 5 mg in dextrose 5 % 250 mL infusion, 0.01 mcg/kg/min, Intravenous, Continuous PRN, Ravindra Keith MD    nitroGLYCERIN 50 mg in dextrose 5% 250 mL infusion, 5 mcg/min, Intravenous, Continuous PRN, Ravindra Keith MD    sodium chloride flush 0.9 % injection 10 mL, 10 mL, Intravenous, 2 times per day, Ravindra Keith MD, 10 mL at 01/26/20 0804    sodium chloride flush 0.9 % injection 10 mL, 10 mL, Intravenous, PRN, Ravindra Keith MD    ondansetron [facility]) injection 4 mg, 4 mg, Intravenous, Q8H PRN, Ravindra Keith MD, 4 mg at 01/26/20 0800    insulin regular (HUMULIN R;NOVOLIN R) 100 Units in sodium chloride 0.9 % 100 mL infusion, 1 Units/hr, Intravenous, Continuous, Ravindra Keith MD, Last Rate: 0.4 mL/hr at 01/26/20 0600, 0.39 Units/hr at 01/26/20 0600    glucose (GLUTOSE) 40 % oral gel 15 g, 15 g, Oral, PRN, Ravindra Keith MD    dextrose 50 % IV solution, 12.5 g, Intravenous, PRN, Ravindra Keith MD    glucagon (rDNA) injection 1 mg, 1 mg, Intramuscular, PRN, Ravindra Keith MD    dextrose 5 % solution, 100 mL/hr, Intravenous, PRN, Ravindra Keith MD    ceFAZolin (ANCEF) 2 g in dextrose 5 % 50 mL IVPB, 2 g, Intravenous, Q8H, Ravindra Keith MD, Stopped at 01/26/20 0630    pantoprazole (PROTONIX) injection 40 mg, 40 mg, Intravenous, Daily, 40 mg at 01/26/20 0748 **AND** sodium chloride (PF) 0.9 % injection 10 mL, 10 mL, Intravenous, Daily, Ravindra Keith MD, 10 mL at 01/26/20 0804    aspirin EC tablet 81 mg, 81 mg, Oral, Daily,

## 2020-01-26 NOTE — BRIEF OP NOTE
Brief Postoperative Note  ______________________________________________________________    Patient: Jodie Guy  YOB: 1950  MRN: [id number]  Date of Procedure: 1/25/2020    Pre-Op Diagnosis: IN PT    Post-Op Diagnosis: Same       Procedure(s):  CABG CORONARY ARTERY BYPASS    Anesthesia: General    Surgeon(s):  Loree Dupree MD    Assistant: Srini Funk RN RFA    Estimated Blood Loss (mL): N/A    Complications: None    Specimens:   * No specimens in log *    Implants:  * No implants in log *      Drains:   Closed/Suction Drain Superior;Medial Chest Other (Comment) (Active)   Site Description Unable to view 1/26/2020 11:38 AM   Dressing Status Clean;Dry; Intact 1/26/2020 11:38 AM   Drainage Appearance Serosanguinous 1/26/2020 11:38 AM   Status Suction-low continuous 1/26/2020  4:00 AM   Output (ml) 45 ml 1/26/2020  5:30 AM       Closed/Suction Drain Left RUQ (Active)   Site Description Unable to view 1/26/2020 11:38 AM   Dressing Status Clean;Dry; Intact 1/26/2020 11:38 AM   Drainage Appearance Serosanguinous 1/26/2020 11:38 AM   Status Suction-low continuous 1/26/2020  4:00 AM   Output (ml) 50 ml 1/26/2020  5:30 AM       Closed/Suction Drain Medial RUQ (Active)   Site Description Unable to view 1/26/2020 11:38 AM   Dressing Status Clean;Dry; Intact 1/26/2020 11:38 AM   Drainage Appearance Serosanguinous 1/26/2020 11:38 AM   Status Suction-low continuous 1/26/2020  4:00 AM   Output (ml) 50 ml 1/26/2020  5:30 AM       Urethral Catheter Double-lumen;Non-latex (Active)   Catheter Indications Need for fluid management in critically ill patients in a critical care setting not able to be managed by other means such as BSC with hat, bedpan, urinal, condom catheter, or short term intermittent urethral catherization 1/26/2020 11:38 AM   Site Assessment No urethral drainage 1/26/2020 11:38 AM   Urine Color Yellow 1/26/2020 11:38 AM   Urine Appearance Clear 1/26/2020 11:38 AM   Output (mL) 175 mL 1/26/2020  1:00 PM [REMOVED] NG/OG/NJ/NE Tube [facility] mouth (Removed)   Surrounding Skin Dry; Intact; Non reddened 1/25/2020  8:00 PM   Securement device Yes 1/25/2020  8:00 PM   Status Suction-low intermittent 1/25/2020  8:00 PM   Placement Verified by X-Ray (repeat) 1/25/2020  8:00 PM   NG/OG/NJ/NE External Measurement (cm) 53 cm 1/25/2020  8:00 PM   Drainage Appearance Clear 1/25/2020  8:00 PM       Findings: Good quality skelatinized LIMA, good quality RSVG, CABg X 2 with SLIMA to LAD and RSVG1 to RAMUS (occluded vessel), palpable pulses present in both grafts with no EKG or ECHO evidence of any ischemia at the conclusion of the case.     Jonathon Kaiser MD  Date: 1/26/2020  Time: 2:01 PM

## 2020-01-26 NOTE — PLAN OF CARE
Nutrition Problem: Inadequate oral intake  Intervention: Food and/or Nutrient Delivery: Continue current diet, Start ONS  Nutritional Goals: Meet % estimated nutrition needs

## 2020-01-27 LAB
-: ABNORMAL
AMORPHOUS: ABNORMAL
ANION GAP SERPL CALCULATED.3IONS-SCNC: 12 MMOL/L (ref 9–17)
ANION GAP SERPL CALCULATED.3IONS-SCNC: 13 MMOL/L (ref 9–17)
BACTERIA: ABNORMAL
BILIRUBIN URINE: NEGATIVE
BLD PROD TYP BPU: NORMAL
BLD PROD TYP BPU: NORMAL
BUN BLDV-MCNC: 51 MG/DL (ref 8–23)
BUN BLDV-MCNC: 51 MG/DL (ref 8–23)
BUN/CREAT BLD: 17 (ref 9–20)
BUN/CREAT BLD: 18 (ref 9–20)
CALCIUM SERPL-MCNC: 8.7 MG/DL (ref 8.6–10.4)
CALCIUM SERPL-MCNC: 8.8 MG/DL (ref 8.6–10.4)
CASTS UA: ABNORMAL /LPF
CHLORIDE BLD-SCNC: 105 MMOL/L (ref 98–107)
CHLORIDE BLD-SCNC: 108 MMOL/L (ref 98–107)
CO2: 21 MMOL/L (ref 20–31)
CO2: 21 MMOL/L (ref 20–31)
COLOR: YELLOW
COMMENT UA: ABNORMAL
CREAT SERPL-MCNC: 2.83 MG/DL (ref 0.7–1.2)
CREAT SERPL-MCNC: 3.08 MG/DL (ref 0.7–1.2)
CREATININE URINE: 132.6 MG/DL (ref 39–259)
CRYSTALS, UA: ABNORMAL /HPF
DISPENSE STATUS BLOOD BANK: NORMAL
DISPENSE STATUS BLOOD BANK: NORMAL
EKG ATRIAL RATE: 61 BPM
EKG P AXIS: 71 DEGREES
EKG P-R INTERVAL: 198 MS
EKG Q-T INTERVAL: 528 MS
EKG QRS DURATION: 100 MS
EKG QTC CALCULATION (BAZETT): 531 MS
EKG R AXIS: 71 DEGREES
EKG T AXIS: 138 DEGREES
EKG VENTRICULAR RATE: 61 BPM
EOSINOPHIL,URINE: NORMAL
EPITHELIAL CELLS UA: ABNORMAL /HPF (ref 0–5)
GFR AFRICAN AMERICAN: 25 ML/MIN
GFR AFRICAN AMERICAN: 27 ML/MIN
GFR NON-AFRICAN AMERICAN: 20 ML/MIN
GFR NON-AFRICAN AMERICAN: 22 ML/MIN
GFR SERPL CREATININE-BSD FRML MDRD: ABNORMAL ML/MIN/{1.73_M2}
GLUCOSE BLD-MCNC: 120 MG/DL (ref 75–110)
GLUCOSE BLD-MCNC: 157 MG/DL (ref 75–110)
GLUCOSE BLD-MCNC: 160 MG/DL (ref 70–99)
GLUCOSE BLD-MCNC: 161 MG/DL (ref 70–99)
GLUCOSE BLD-MCNC: 167 MG/DL (ref 75–110)
GLUCOSE BLD-MCNC: 170 MG/DL (ref 75–110)
GLUCOSE URINE: NEGATIVE
HCT VFR BLD CALC: 32 % (ref 40.7–50.3)
HEMOGLOBIN: 9.8 G/DL (ref 13–17)
INR BLD: 1
KETONES, URINE: NEGATIVE
LEUKOCYTE ESTERASE, URINE: NEGATIVE
MAGNESIUM: 2.3 MG/DL (ref 1.6–2.6)
MCH RBC QN AUTO: 29.1 PG (ref 25.2–33.5)
MCHC RBC AUTO-ENTMCNC: 30.6 G/DL (ref 28.4–34.8)
MCV RBC AUTO: 95 FL (ref 82.6–102.9)
MICROALBUMIN/CREAT 24H UR: 535 MG/L
MICROALBUMIN/CREAT UR-RTO: 403 MCG/MG CREAT
MUCUS: ABNORMAL
NITRITE, URINE: NEGATIVE
NRBC AUTOMATED: 0 PER 100 WBC
OTHER OBSERVATIONS UA: ABNORMAL
PDW BLD-RTO: 15 % (ref 11.8–14.4)
PH UA: 5.5 (ref 5–8)
PHOSPHORUS: 6.3 MG/DL (ref 2.5–4.5)
PLATELET # BLD: 109 K/UL (ref 138–453)
PMV BLD AUTO: 11.7 FL (ref 8.1–13.5)
POTASSIUM SERPL-SCNC: 4.5 MMOL/L (ref 3.7–5.3)
POTASSIUM SERPL-SCNC: 4.7 MMOL/L (ref 3.7–5.3)
PROTEIN UA: ABNORMAL
PROTHROMBIN TIME: 10.7 SEC (ref 9.7–11.6)
RBC # BLD: 3.37 M/UL (ref 4.21–5.77)
RBC UA: ABNORMAL /HPF (ref 0–2)
RENAL EPITHELIAL, UA: ABNORMAL /HPF
SODIUM BLD-SCNC: 138 MMOL/L (ref 135–144)
SODIUM BLD-SCNC: 142 MMOL/L (ref 135–144)
SODIUM,UR: 36 MMOL/L
SPECIFIC GRAVITY UA: 1.02 (ref 1–1.03)
TRANSFUSION STATUS: NORMAL
TRANSFUSION STATUS: NORMAL
TRICHOMONAS: ABNORMAL
TURBIDITY: CLEAR
UNIT DIVISION: 0
UNIT DIVISION: 0
UNIT NUMBER: NORMAL
UNIT NUMBER: NORMAL
URINE HGB: ABNORMAL
UROBILINOGEN, URINE: NORMAL
WBC # BLD: 12.6 K/UL (ref 3.5–11.3)
WBC UA: ABNORMAL /HPF (ref 0–5)
YEAST: ABNORMAL

## 2020-01-27 PROCEDURE — APPSS15 APP SPLIT SHARED TIME 0-15 MINUTES: Performed by: PHYSICIAN ASSISTANT

## 2020-01-27 PROCEDURE — C9113 INJ PANTOPRAZOLE SODIUM, VIA: HCPCS | Performed by: THORACIC SURGERY (CARDIOTHORACIC VASCULAR SURGERY)

## 2020-01-27 PROCEDURE — 82947 ASSAY GLUCOSE BLOOD QUANT: CPT

## 2020-01-27 PROCEDURE — 6370000000 HC RX 637 (ALT 250 FOR IP): Performed by: THORACIC SURGERY (CARDIOTHORACIC VASCULAR SURGERY)

## 2020-01-27 PROCEDURE — 85610 PROTHROMBIN TIME: CPT

## 2020-01-27 PROCEDURE — 97530 THERAPEUTIC ACTIVITIES: CPT

## 2020-01-27 PROCEDURE — 2580000003 HC RX 258: Performed by: INTERNAL MEDICINE

## 2020-01-27 PROCEDURE — 6370000000 HC RX 637 (ALT 250 FOR IP): Performed by: FAMILY MEDICINE

## 2020-01-27 PROCEDURE — 82043 UR ALBUMIN QUANTITATIVE: CPT

## 2020-01-27 PROCEDURE — 84100 ASSAY OF PHOSPHORUS: CPT

## 2020-01-27 PROCEDURE — 97166 OT EVAL MOD COMPLEX 45 MIN: CPT

## 2020-01-27 PROCEDURE — 82570 ASSAY OF URINE CREATININE: CPT

## 2020-01-27 PROCEDURE — 87205 SMEAR GRAM STAIN: CPT

## 2020-01-27 PROCEDURE — 94669 MECHANICAL CHEST WALL OSCILL: CPT

## 2020-01-27 PROCEDURE — 84300 ASSAY OF URINE SODIUM: CPT

## 2020-01-27 PROCEDURE — 2000000000 HC ICU R&B

## 2020-01-27 PROCEDURE — 6370000000 HC RX 637 (ALT 250 FOR IP): Performed by: INTERNAL MEDICINE

## 2020-01-27 PROCEDURE — 83735 ASSAY OF MAGNESIUM: CPT

## 2020-01-27 PROCEDURE — 80048 BASIC METABOLIC PNL TOTAL CA: CPT

## 2020-01-27 PROCEDURE — 6360000002 HC RX W HCPCS: Performed by: THORACIC SURGERY (CARDIOTHORACIC VASCULAR SURGERY)

## 2020-01-27 PROCEDURE — 2580000003 HC RX 258: Performed by: THORACIC SURGERY (CARDIOTHORACIC VASCULAR SURGERY)

## 2020-01-27 PROCEDURE — 97116 GAIT TRAINING THERAPY: CPT

## 2020-01-27 PROCEDURE — 99024 POSTOP FOLLOW-UP VISIT: CPT | Performed by: PHYSICIAN ASSISTANT

## 2020-01-27 PROCEDURE — 97535 SELF CARE MNGMENT TRAINING: CPT

## 2020-01-27 PROCEDURE — 81001 URINALYSIS AUTO W/SCOPE: CPT

## 2020-01-27 PROCEDURE — 85027 COMPLETE CBC AUTOMATED: CPT

## 2020-01-27 PROCEDURE — APPSS30 APP SPLIT SHARED TIME 16-30 MINUTES: Performed by: PHYSICIAN ASSISTANT

## 2020-01-27 RX ORDER — 0.9 % SODIUM CHLORIDE 0.9 %
500 INTRAVENOUS SOLUTION INTRAVENOUS ONCE
Status: COMPLETED | OUTPATIENT
Start: 2020-01-27 | End: 2020-01-27

## 2020-01-27 RX ADMIN — METOPROLOL TARTRATE 12.5 MG: 25 TABLET ORAL at 09:03

## 2020-01-27 RX ADMIN — OXYCODONE AND ACETAMINOPHEN 2 TABLET: 5; 325 TABLET ORAL at 06:34

## 2020-01-27 RX ADMIN — SENNOSIDES 8.6 MG: 8.6 TABLET, FILM COATED ORAL at 21:19

## 2020-01-27 RX ADMIN — OXYCODONE AND ACETAMINOPHEN 2 TABLET: 5; 325 TABLET ORAL at 00:23

## 2020-01-27 RX ADMIN — SODIUM BICARBONATE 1300 MG: 650 TABLET ORAL at 09:00

## 2020-01-27 RX ADMIN — SENNOSIDES 8.6 MG: 8.6 TABLET, FILM COATED ORAL at 09:03

## 2020-01-27 RX ADMIN — OXYCODONE AND ACETAMINOPHEN 2 TABLET: 5; 325 TABLET ORAL at 15:16

## 2020-01-27 RX ADMIN — Medication 10 ML: at 09:00

## 2020-01-27 RX ADMIN — OXYBUTYNIN CHLORIDE 5 MG: 5 TABLET ORAL at 21:19

## 2020-01-27 RX ADMIN — SODIUM CHLORIDE 500 ML: 9 INJECTION, SOLUTION INTRAVENOUS at 19:32

## 2020-01-27 RX ADMIN — INSULIN LISPRO 2 UNITS: 100 INJECTION, SOLUTION INTRAVENOUS; SUBCUTANEOUS at 12:59

## 2020-01-27 RX ADMIN — LUBIPROSTONE 8 MCG: 8 CAPSULE, GELATIN COATED ORAL at 18:41

## 2020-01-27 RX ADMIN — ATORVASTATIN CALCIUM 40 MG: 40 TABLET, FILM COATED ORAL at 21:19

## 2020-01-27 RX ADMIN — TAMSULOSIN HYDROCHLORIDE 0.4 MG: 0.4 CAPSULE ORAL at 09:00

## 2020-01-27 RX ADMIN — INSULIN LISPRO 1 UNITS: 100 INJECTION, SOLUTION INTRAVENOUS; SUBCUTANEOUS at 21:24

## 2020-01-27 RX ADMIN — PANTOPRAZOLE SODIUM 40 MG: 40 INJECTION, POWDER, FOR SOLUTION INTRAVENOUS at 09:00

## 2020-01-27 RX ADMIN — CLOPIDOGREL BISULFATE 75 MG: 75 TABLET ORAL at 09:00

## 2020-01-27 RX ADMIN — LUBIPROSTONE 8 MCG: 8 CAPSULE, GELATIN COATED ORAL at 09:00

## 2020-01-27 RX ADMIN — OXYCODONE AND ACETAMINOPHEN 2 TABLET: 5; 325 TABLET ORAL at 10:52

## 2020-01-27 RX ADMIN — OXYBUTYNIN CHLORIDE 5 MG: 5 TABLET ORAL at 09:00

## 2020-01-27 RX ADMIN — SODIUM BICARBONATE 1300 MG: 650 TABLET ORAL at 21:19

## 2020-01-27 RX ADMIN — Medication 10 ML: at 08:59

## 2020-01-27 RX ADMIN — DOCUSATE SODIUM 100 MG: 100 CAPSULE, LIQUID FILLED ORAL at 21:19

## 2020-01-27 RX ADMIN — Medication 10 ML: at 09:07

## 2020-01-27 RX ADMIN — ASPIRIN 81 MG: 81 TABLET, COATED ORAL at 09:01

## 2020-01-27 RX ADMIN — Medication 10 ML: at 08:58

## 2020-01-27 RX ADMIN — METOPROLOL TARTRATE 12.5 MG: 25 TABLET ORAL at 21:19

## 2020-01-27 RX ADMIN — OXYCODONE AND ACETAMINOPHEN 2 TABLET: 5; 325 TABLET ORAL at 21:19

## 2020-01-27 RX ADMIN — DOCUSATE SODIUM 100 MG: 100 CAPSULE, LIQUID FILLED ORAL at 09:03

## 2020-01-27 ASSESSMENT — PAIN SCALES - GENERAL
PAINLEVEL_OUTOF10: 6
PAINLEVEL_OUTOF10: 9
PAINLEVEL_OUTOF10: 8
PAINLEVEL_OUTOF10: 8
PAINLEVEL_OUTOF10: 7
PAINLEVEL_OUTOF10: 6
PAINLEVEL_OUTOF10: 7
PAINLEVEL_OUTOF10: 7
PAINLEVEL_OUTOF10: 9

## 2020-01-27 ASSESSMENT — PAIN DESCRIPTION - LOCATION
LOCATION: GENERALIZED;CHEST
LOCATION: CHEST
LOCATION: CHEST;GENERALIZED

## 2020-01-27 ASSESSMENT — PAIN DESCRIPTION - PAIN TYPE
TYPE: SURGICAL PAIN

## 2020-01-27 ASSESSMENT — PAIN DESCRIPTION - ONSET: ONSET: PROGRESSIVE

## 2020-01-27 ASSESSMENT — PAIN DESCRIPTION - FREQUENCY: FREQUENCY: INTERMITTENT

## 2020-01-27 ASSESSMENT — PAIN DESCRIPTION - DESCRIPTORS
DESCRIPTORS: DISCOMFORT
DESCRIPTORS: DISCOMFORT

## 2020-01-27 NOTE — CARE COORDINATION
Dc planning    POD#2 CABG x2.   Pt up in the chair chest tubes remain intact. Pt has been up walking with therapy. Met with pt to follow up with dc planning. Discussed PT recommendation for SNF. [facility] list provided. The Plan for Transition of Care is related to the following treatment goals: physical and OT therapy for strengthening. The Patient was provided with a choice of provider and agrees   with the discharge plan. [x] Yes [] No    Freedom of choice list was provided with basic dialogue that supports the patient's individualized plan of care/goals, treatment preferences and shares the quality data associated with the providers. [x] Yes [] No    Pt's 1st choice is [facility]. Referral called to . Await acceptance. Will need precert.

## 2020-01-27 NOTE — PROGRESS NOTES
12/15/19   Balbir Johnsno MD   atorvastatin (LIPITOR) 20 MG tablet Take 1 tablet by mouth nightly 12/13/19   Smith Mcallister MD   tamsulosin (FLOMAX) 0.4 MG capsule Take 1 capsule by mouth daily 12/14/19   Smith Mcallister MD   metoprolol tartrate (LOPRESSOR) 50 MG tablet Take 1 tablet by mouth 2 times daily 12/13/19   Smith Mcallister MD   amLODIPine (NORVASC) 10 MG tablet Take 1 tablet by mouth daily 12/14/19   Smith Mcallister MD       Scheduled Meds:   metoprolol tartrate  12.5 mg Oral BID    atorvastatin  40 mg Oral Nightly    lubiprostone  8 mcg Oral BID WC    sodium bicarbonate  1,300 mg Oral BID    sodium chloride flush  10 mL Intravenous 2 times per day    pantoprazole  40 mg Intravenous Daily    And    sodium chloride (PF)  10 mL Intravenous Daily    aspirin  81 mg Oral Daily    clopidogrel  75 mg Oral Daily    docusate sodium  100 mg Oral BID    insulin lispro  0-12 Units Subcutaneous TID WC    insulin lispro  0-6 Units Subcutaneous Nightly    senna  1 tablet Oral BID    oxybutynin  5 mg Oral TID    tamsulosin  0.4 mg Oral Daily     Physical Exam:  Vitals:    01/27/20 0800 01/27/20 0900 01/27/20 1000 01/27/20 1002   BP: 124/81 114/72 103/69    Pulse: 79 83 71    Resp: 18      Temp: 98 °F (36.7 °C)      TempSrc: Temporal      SpO2: 96% 96% 92% 93%   Weight:       Height:         I/O last 3 completed shifts: In: 2334 [I.V.:983; IV Piggyback:50]  Out: 5669 [Urine:675; Drains:570]      General:  Awake, alert, not in distress. Appears to be stated age. HEENT: Atraumatic, normocephalic. Anicteric sclera. Pink and moist oral mucosa. Neck supple. No JVD. Chest: Bilateral air entry, clear to auscultation, no wheezing, rhonchi or rales. Cardiovascular: RRR, S1S2, no murmur, rub or gallop. No lower extremity edema. Abdomen: Soft, non tender to palpation. Musculoskeletal:    No cyanosis or clubbing. Integumentary: Pink, warm and dry. Free from rash or lesions.  Skin turgor normal.  CNS: Oriented to

## 2020-01-27 NOTE — CARE COORDINATION
Social Work-Therapy is recommending short term SNF for rehab. The Plan for Transition of Care is related to the following treatment goals: SNF for PT/OT/skilled nursing. The Patient and/or patient representative patient was provided with a choice of provider and agrees   with the discharge plan. [x] Yes [] No    Freedom of choice list was provided with basic dialogue that supports the patient's individualized plan of care/goals, treatment preferences and shares the quality data associated with the providers. [x] Yes [] No. Patient is requesting [facility] as first choice.  Sent referral. Stephy Dumont

## 2020-01-27 NOTE — PROGRESS NOTES
Level: 6  Patient's Stated Pain Goal: No pain  Pain Type: Surgical pain  Vital Signs  Patient Currently in Pain: Yes       Orientation     Cognition      Objective      Transfers  Sit to Stand: Minimal Assistance  Stand to sit: Minimal Assistance  Stand Pivot Transfers: Minimal Assistance  Ambulation  Ambulation?: Yes  More Ambulation?: No  Ambulation 1  Surface: level tile  Device: Rolling Walker  Assistance: Minimal assistance  Gait Deviations: Slow Jerilyn;Decreased step length;Decreased step height  Distance: 125 ft  Comments: Patient pain level decreased during ambulation however fattigue level increased and patient displayed decreased endurance. Stairs/Curb  Stairs?: No     Balance  Sitting - Static: Good  Sitting - Dynamic: Fair;+  Standing - Static: Good  Standing - Dynamic: Good;-  Comments: Patient sitting up in chair upon arrival and patient back in chair upon departure with call light in place and chest tubes reconnected catherter placed back in position also. Exercises  Comments: Patient educated on the importance of continuing LE exercises to promote joint kinematics and reduce edema                        G-Code     OutComes Score                                                     AM-PAC Score  AM-PAC Inpatient Mobility Raw Score : 13 (01/27/20 1115)  AM-PAC Inpatient T-Scale Score : 36.74 (01/27/20 1115)  Mobility Inpatient CMS 0-100% Score: 64.91 (01/27/20 1115)  Mobility Inpatient CMS G-Code Modifier : CL (01/27/20 1115)          Goals  Short term goals  Time Frame for Short term goals: 12 visits  Short term goal 1: Pt to be Kathleen for bed mobility with following sternal precautions. Short term goal 2: Pt to be Kathleen for transfer with following sternal precautions and safe sequencing. Short term goal 3: Pt to amb 100 feet with LRAD vs no device with SBA as medical stability dictates. Short term goal 4: Pt to amb up/down 4 steps with B rails SBA, if returns directly to home.     Plan Plan  Times per week: BID - 7 days/wk  Times per day: Twice a day  Current Treatment Recommendations: Strengthening, Transfer Training, Endurance Training, Neuromuscular Re-education, Patient/Caregiver Education & Training, ROM, Pain Management, Balance Training, Gait Training, Home Exercise Program, Functional Mobility Training, Stair training  Safety Devices  Type of devices: Call light within reach, Gait belt, Left in chair, Nurse notified  Restraints  Initially in place: No     Therapy Time   Individual Concurrent Group Co-treatment   Time In  915         Time Out  945         Minutes  [address] Student Physical Therapy

## 2020-01-27 NOTE — PROGRESS NOTES
mellitus ([facility] [address] and Kidney stone. has a past surgical history that includes back surgery; Ureter stent placement; Cystocopy (12/06/2019); Cystoscopy (N/A, 12/6/2019); and Coronary artery bypass graft (N/A, 1/25/2020). PER H&P: She Lira is a 77 y.o.  male who presents with Chest Pain     Patient presents with complaints of pain over his left to mid chest.  She says pain has been going on for almost 1 month now. Pain is moderate to severe. Last several minutes resolve spontaneously. Denies any radiation to his shoulders or back. Has radiation to arms or abdomen. Denies any nausea or diaphoresis associated with that. Denies palpitations or dyspnea. Denies fever or orthopnea. States he has had dry cough     Patient was admitted to the hospital earlier for nephrolithiasis. Patient had acute kidney injury and has a history of chronic kidney disease.   Says he has had bronchitis in the past and is unwilling to lay in the bed which would have direct blow from the vent      *Per chart:  Date of Procedure: 1/25/2020     Pre-Op Diagnosis: IN PT     Post-Op Diagnosis: Same       Procedure(s):  CABG CORONARY ARTERY BYPASS    Restrictions  Restrictions/Precautions  Restrictions/Precautions: Fall Risk, Up as Tolerated(per RN this date )  Required Braces or Orthoses?: Yes(heart hugger )  Required Braces or Orthoses  Other: Heart Hugger Brace(patient also had 2 chest tubes)  Position Activity Restriction  Sternal Precautions: No Pushing, No Pulling(NO lifting, pushing, pulling)  Sternal Precautions: post-op CABG x 2 - heart hugger on at all times  Other position/activity restrictions: martinez, cardiac precautions, chest tubes, R sided neck IV, pt is s/p CABG 1/25, dental soft diet/no caffeine, amb pt, up in chair for meals, elevate heels off bed AATM's, bed rest with BSC     Subjective   General  Chart Reviewed: Yes  Patient assessed for rehabilitation services?: Yes  Family / Caregiver Present: No  Patient Mobility Device: Rolling Walker  Activity: (in room and around nurses station )  Assist Level: Minimal assistance(x2 for safety/balance and due to interimittent c/o dizziness.  )  Functional Mobility Comments: Max verbal instruction/tactile assist to slow down movements/pace self, upright posture, RW safety, pursed lip breathing as well as awareness/asssist with lines to increase safety/reduce falls. Toilet Transfers  Toilet Transfers Comments: N/T and pt with martinez and no BM needs during session. ADL  Feeding: Setup(pt states he has a poor appetite at this time.  )  Grooming: Setup;Minimal assistance(seated )  UE Bathing: Setup;Minimal assistance  LE Bathing: Setup; Moderate assistance  UE Dressing: Setup; Moderate assistance  LE Dressing: Maximum assistance  Toileting: Dependent/Total(martinez )  Tone RUE  RUE Tone: Normotonic  Tone LUE  LUE Tone: Normotonic  Coordination  Movements Are Fluid And Coordinated: Yes  Coordination and Movement description: Fine motor impairments(L hand due to edema )     Bed mobility  Supine to Sit: Unable to assess  Sit to Supine: Unable to assess  Comment: Pt up in chair upon arrival and RN requested pt to stay up and pt's BLE's elevated in recliner/pillow under to reduce edema as able. Pt was edu on importance of elevation/B ankle AROM and pumping as able to reduce swelling. Pt with good verbalization of understanding.     Transfers  Stand Step Transfers: Minimal assistance;2 Person assistance(with RW and increased time as well as for safety as pt with intermittent c/o dizziness)  Sit to stand: Minimal assistance;2 Person assistance  Stand to sit: Minimal assistance;2 Person assistance  Transfer Comments: Max verbal instruction/tactile assist for hand placement, proper use of heart hugger(max assist for closure), upright posture, RW safety, slowing down movements/pacing, controlled stand to sits and awareness/assist with all lines to reduce risk for falls and increase overall safety awareness. Cognition  Overall Cognitive Status: Exceptions  Arousal/Alertness: Appropriate responses to stimuli  Following Commands: Follows all commands without difficulty  Attention Span: Attends with cues to redirect  Memory: Decreased short term memory  Safety Judgement: Decreased awareness of need for safety;Decreased awareness of need for assistance  Problem Solving: Assistance required to identify errors made;Assistance required to correct errors made;Decreased awareness of errors  Insights: Decreased awareness of deficits  Initiation: Requires cues for some  Sequencing: Requires cues for some  Perception  Overall Perceptual Status: WFL     Sensation  Overall Sensation Status: Impaired(pt c/o B hand neuropathy )        BUE AROM (degrees)  BUE AROM : WFL  BUE General AROM: within cardiac precautions for B shld AROM; rest WFLS. Pt was edu/demo on no lifting UE's > 90 degrees for B shld movements/no over head reaching and pt with good verbalization of understanding. BUE Strength  BUE Strength Comment: MMT N/T due to open heart sx; BUE strength however WFLS in function                    Plan   Plan  Times per week: 4-5x/week 1-2x/day as roma  Current Treatment Recommendations: Strengthening, Balance Training, Neuromuscular Re-education, Safety Education & Training, Self-Care / ADL, Endurance Training, Pain Management, Equipment Evaluation, Education, & procurement, Home Management Training, Patient/Caregiver Education & Training, Functional Mobility Training                                                  AM-PAC Score   14          Goals  Short term goals  Time Frame for Short term goals: by discharge, pt to demo   Short term goal 1: bed mob tasks with use of rail as needed to SUP. Short term goal 2: I with BUE cardiac HEP with hand outs as needed to maintain functional use. Short term goal 3: UB ADL to set up and LB ADL to min assist with use of AD/AE as needed.     Short term goal 4:

## 2020-01-27 NOTE — PROGRESS NOTES
[address] Hospitalist        1/27/2020   6:14 PM    Name:  Allen Maria  MRN:    [id number]     Acct:     [de-identified]   Room:  2030/2030-01  IP Day: 3     Admit Date: 1/22/2020  3:03 PM  PCP: Tuan Hook MD    C/C:   Chief Complaint   Patient presents with    Chest Pain       Assessment:      · Coronary artery bypass graft - 2 vessel 1/25/20  · NSTEMI / Unstable Angina   · Coronary artery disease   · Positive cardiac stress test  · S/p Intubation 1/25/20, extubated by PM  · Essential hypertension  · Kidney disease stage III  · Mixed hyperlipidemia  · Urge incontinence  · Benign prostatic hypertrophy  · Constipation  · Chronic obstructive pulmonary disease  · Chronic pain syndrome  · Anxiety disorder        Plan:        · Admit to progressive- > CVTU ICU  · Monitor vitals closely  · SPO2 above 90%  · Pain control  · MS Contin every 12 hours- PRN pain  · Start aspirin  · Start Lipitor  · Resume Lopressor  · Resume Ditropan  · Resume Flomax  · Resume Norvasc  · Oxygen PRN  · Nitro glycerin as needed  · Hold ACE inhibitor secondary to renal impairment  · Antiemetics Zofran as needed  · DuoNeb as needed  · RT eval  · Check CBC, BMP daily  · Check LFTs  · Get lipid panel  · Consult cardiology  · DVT and GI prophylaxis  · Cardiothoracic surgery on board  · Nephrology consulted considering CKD 3 and need for IV dye, subsequent CABG  · Start Colace 100 mg p.o. twice daily  · Start senna 8.6 twice daily  · Patient says takes extended release morphine as needed-we will hold 1 dose, will need medication choice adjustment  · Tolerating oxycodone PO   · Fleet enema x1 today  · Ativan IV prn only      Subjective:     Patient seen and examined at bedside.     Chest wall pain is better    Has had chronic pain issues  Was not taking MS Contin regularly  Refuses to take twice daily  other options discussed  Also discussed need for better control kalen w CABG    Will hold off for now  Start Percocet 10 mg Q4h prn pain mod severe  Start Amitiza 8mcg bid  Cont colace 100bid  Cont Senna bid  Hold if has more than 2 BMs per day    Pain control adequate  Tolerating very well  Patient pleased with his progress      ROS:  A 10 point system reviewed        Physical Examination:      Vitals:  /74   Pulse 80   Temp 96.7 °F (35.9 °C) (Temporal)   Resp 18   Ht 5' 7\" (1.702 m)   Wt 169 lb 11.2 oz (77 kg)   SpO2 96%   BMI 26.58 kg/m²   Temp (24hrs), Av.8 °F (36.6 °C), Min:96.7 °F (35.9 °C), Max:98.3 °F (36.8 °C)    Weight:   Wt Readings from Last 3 Encounters:   20 169 lb 11.2 oz (77 kg)   12/15/19 179 lb (81.2 kg)     I/O last 3 completed shifts:  I/O last 3 completed shifts: In: 4274 [P.O.:540; I.V.:983; IV Piggyback:50]  Out: 1320 [Urine:750; Drains:570]     Recent Labs     20  1656 20  0711 20  1114 20  1635   POCGLU 175* 157* 170* 120*         General appearance - well appearing, and in acute distress sec to pain  Mental status - alert and oriented  Head - normocephalic and atraumatic  Eyes - pupils equal and reactive, extraocular eye movements intact, conjunctiva clear  Ears - hearing appears to be intact  Nose - no drainage noted  Mouth - mucous membranes moist  Neck - supple, no carotid bruits, thyroid not palpable  Chest - clear to auscultation, normal effort  Heart - normal rate, regular rhythm, systolic murmur  Abdomen - soft, nontender, nondistended, bowel sounds present all four quadrants, no masses, hepatomegaly or splenomegaly  Neurological - AO3, able to move all limbs  Extremities - peripheral pulses palpable, no pedal edema or calf pain with palpation  Skin - no gross lesions, rashes, or induration noted        Medications: Allergies:    Allergies   Allergen Reactions    Iodinated Diagnostic Agents Anaphylaxis     FACE SWELLS UP       Current Meds:   Current Facility-Administered Medications:     0.9 % sodium chloride bolus, 500 mL, Intravenous, Once, Lisabeth Rodrigues MD Anuradha    metoprolol tartrate (LOPRESSOR) tablet 12.5 mg, 12.5 mg, Oral, BID, Jonathon Kaiser MD, 12.5 mg at 01/27/20 6154    atorvastatin (LIPITOR) tablet 40 mg, 40 mg, Oral, Nightly, Jonathon Kaiser MD    lubiprostone (AMITIZA) capsule 8 mcg, 8 mcg, Oral, BID WC, Balbir Johnson MD, 8 mcg at 01/27/20 0900    oxyCODONE-acetaminophen (PERCOCET) 5-325 MG per tablet 1 tablet, 1 tablet, Oral, Q4H PRN **OR** oxyCODONE-acetaminophen (PERCOCET) 5-325 MG per tablet 2 tablet, 2 tablet, Oral, Q4H PRN, Balbir Johnson MD, 2 tablet at 01/27/20 1516    sodium bicarbonate tablet 1,300 mg, 1,300 mg, Oral, BID, Abbie West MD, 1,300 mg at 01/27/20 0900    norepinephrine (LEVOPHED) 16 mg in dextrose 5 % 250 mL infusion, 0.01 mcg/kg/min, Intravenous, Continuous PRN, Jonathon Kaiser MD    EPINEPHrine (EPINEPHrine HCL) 5 mg in dextrose 5 % 250 mL infusion, 0.01 mcg/kg/min, Intravenous, Continuous PRN, Jonathon Kaiser MD    nitroGLYCERIN 50 mg in dextrose 5% 250 mL infusion, 5 mcg/min, Intravenous, Continuous PRN, Jonathon Kaiser MD    sodium chloride flush 0.9 % injection 10 mL, 10 mL, Intravenous, 2 times per day, Jonathon Kaiser MD, 10 mL at 01/27/20 0907    sodium chloride flush 0.9 % injection 10 mL, 10 mL, Intravenous, PRN, Jonathon Kaiser MD    ondansetron [facility]) injection 4 mg, 4 mg, Intravenous, Q8H PRN, Jonathon Kaiser MD, 4 mg at 01/26/20 0800    insulin regular (HUMULIN R;NOVOLIN R) 100 Units in sodium chloride 0.9 % 100 mL infusion, 1 Units/hr, Intravenous, Continuous, Jonathon Kaiser MD, Stopped at 01/26/20 0900    glucose (GLUTOSE) 40 % oral gel 15 g, 15 g, Oral, PRN, Jonathon Kaiser MD    dextrose 50 % IV solution, 12.5 g, Intravenous, PRN, Jonathon Kaiser MD    glucagon (rDNA) injection 1 mg, 1 mg, Intramuscular, PRN, Jonathon Kaiser MD    dextrose 5 % solution, 100 mL/hr, Intravenous, PRN, Jonathon Kaiser MD    pantoprazole (PROTONIX) injection 40 mg, 40 mg, Status:  Full Code        Electronically signed by Triston Mcdonald MD on 1/27/2020 at 6:14 PM    This note was created with the assistance of a speech-recognition program.  Although the intention is to generate a document that actually reflects the content of the visit, no guarantees can be provided that every mistake has been identified and corrected by editing. Note was updated later by me after  physical examination and  completion of the assessment.

## 2020-01-27 NOTE — PROGRESS NOTES
propofol Stopped (01/25/20 2006)    dexmedetomidine (PRECEDEX) IV infusion 0.5 mcg/kg/hr (01/25/20 1958)       Diagnostics:   Telemetry: Sinus rhythm    Labs:   CBC:  Recent Labs     01/26/20  0400 01/27/20  0601   WBC 11.4* 12.6*   HGB 9.7* 9.8*   HCT 31.3* 32.0*   PLT 98* 109*     Magnesium:  Recent Labs     01/26/20  0400 01/27/20  0601   MG 2.3 2.3     BMP:  Recent Labs     01/26/20  0400 01/27/20  0601    142   K 4.6 4.7   CALCIUM 8.3* 8.8   CO2 19* 21   BUN 44* 51*   CREATININE 2.42* 3.08*   LABGLOM 27* 20*   GLUCOSE 112* 161*     BNP:  No results for input(s): BNP, PROBNP in the last 72 hours. PT/INR:  Recent Labs     01/26/20  0400 01/27/20  0601   PROTIME 11.1 10.7   INR 1.1 1.0     APTT:  Recent Labs     01/25/20  0442 01/25/20  1308   APTT 20.4* 28.8     CARDIAC ENZYMES:  No results for input(s): MYOGLOBIN, CKTOTAL, CKMB, CKMBINDEX, TROPHS, TROPONINT in the last 72 hours. FASTING LIPID PANEL:  Lab Results   Component Value Date    HDL 51 01/23/2020    TRIG 88 01/23/2020     LIVER PROFILE:  No results for input(s): AST, ALT, LABALBU, ALKPHOS, BILITOT, BILIDIR, IBILI, PROT, GLOB, ALBUMIN in the last 72 hours. ASSESSMENT:    · NSTEMI with unstable angina s/p post two-vessel coronary artery bypass graft surgery with LIMA to the LAD and saphenous vein graft to the ramus 1/25/2020-stable  · CAD with severe multivessel CAD and transradial cardiac catheterization 1/23/2020  · Mild aortic stenosis with mild LV dysfunction on echocardiogram done December 2019, no gradient across the aortic valve on cardiac catheterization done 1/23/2020  · Renal insufficiency, managed by others  · Diabetes, managed by others  · History of carotid disease and left subclavian stenosis  · Dyslipidemia   · Hallucinations since last admission, managed by others  · IVP dye allergy with facial swelling about 30 years ago    PLAN:  1. Continue current cardiac medications. 2. Patient is making good progress  3.  Increase activity as tolerated.   Uzma Morales MD

## 2020-01-28 LAB
ABO/RH: NORMAL
ANION GAP SERPL CALCULATED.3IONS-SCNC: 11 MMOL/L (ref 9–17)
ANTIBODY SCREEN: NEGATIVE
ARM BAND NUMBER: NORMAL
BLD PROD TYP BPU: NORMAL
BUN BLDV-MCNC: 49 MG/DL (ref 8–23)
BUN/CREAT BLD: 18 (ref 9–20)
CALCIUM SERPL-MCNC: 8.6 MG/DL (ref 8.6–10.4)
CHLORIDE BLD-SCNC: 109 MMOL/L (ref 98–107)
CO2: 22 MMOL/L (ref 20–31)
CREAT SERPL-MCNC: 2.69 MG/DL (ref 0.7–1.2)
CROSSMATCH RESULT: NORMAL
DISPENSE STATUS BLOOD BANK: NORMAL
EXPIRATION DATE: NORMAL
GFR AFRICAN AMERICAN: 29 ML/MIN
GFR NON-AFRICAN AMERICAN: 24 ML/MIN
GFR SERPL CREATININE-BSD FRML MDRD: ABNORMAL ML/MIN/{1.73_M2}
GFR SERPL CREATININE-BSD FRML MDRD: ABNORMAL ML/MIN/{1.73_M2}
GLUCOSE BLD-MCNC: 109 MG/DL (ref 75–110)
GLUCOSE BLD-MCNC: 122 MG/DL (ref 70–99)
GLUCOSE BLD-MCNC: 143 MG/DL (ref 75–110)
GLUCOSE BLD-MCNC: 185 MG/DL (ref 75–110)
HCT VFR BLD CALC: 32 % (ref 40.7–50.3)
HEMOGLOBIN: 9.8 G/DL (ref 13–17)
INR BLD: 1
MAGNESIUM: 2.2 MG/DL (ref 1.6–2.6)
MCH RBC QN AUTO: 29.1 PG (ref 25.2–33.5)
MCHC RBC AUTO-ENTMCNC: 30.6 G/DL (ref 28.4–34.8)
MCV RBC AUTO: 95 FL (ref 82.6–102.9)
NRBC AUTOMATED: 0 PER 100 WBC
PDW BLD-RTO: 14.6 % (ref 11.8–14.4)
PHOSPHORUS: 3.8 MG/DL (ref 2.5–4.5)
PLATELET # BLD: 122 K/UL (ref 138–453)
PMV BLD AUTO: 11.9 FL (ref 8.1–13.5)
POTASSIUM SERPL-SCNC: 4.5 MMOL/L (ref 3.7–5.3)
PROTHROMBIN TIME: 9.8 SEC (ref 9.7–11.6)
RBC # BLD: 3.37 M/UL (ref 4.21–5.77)
SODIUM BLD-SCNC: 142 MMOL/L (ref 135–144)
TRANSFUSION STATUS: NORMAL
UNIT DIVISION: 0
UNIT NUMBER: NORMAL
WBC # BLD: 10.9 K/UL (ref 3.5–11.3)

## 2020-01-28 PROCEDURE — C1769 GUIDE WIRE: HCPCS

## 2020-01-28 PROCEDURE — 6360000002 HC RX W HCPCS: Performed by: THORACIC SURGERY (CARDIOTHORACIC VASCULAR SURGERY)

## 2020-01-28 PROCEDURE — 2700000000 HC OXYGEN THERAPY PER DAY

## 2020-01-28 PROCEDURE — 83735 ASSAY OF MAGNESIUM: CPT

## 2020-01-28 PROCEDURE — 6370000000 HC RX 637 (ALT 250 FOR IP): Performed by: THORACIC SURGERY (CARDIOTHORACIC VASCULAR SURGERY)

## 2020-01-28 PROCEDURE — 94761 N-INVAS EAR/PLS OXIMETRY MLT: CPT

## 2020-01-28 PROCEDURE — 82947 ASSAY GLUCOSE BLOOD QUANT: CPT

## 2020-01-28 PROCEDURE — C1894 INTRO/SHEATH, NON-LASER: HCPCS

## 2020-01-28 PROCEDURE — 97530 THERAPEUTIC ACTIVITIES: CPT | Performed by: NURSE PRACTITIONER

## 2020-01-28 PROCEDURE — 2060000000 HC ICU INTERMEDIATE R&B

## 2020-01-28 PROCEDURE — 94640 AIRWAY INHALATION TREATMENT: CPT

## 2020-01-28 PROCEDURE — 85610 PROTHROMBIN TIME: CPT

## 2020-01-28 PROCEDURE — APPSS30 APP SPLIT SHARED TIME 16-30 MINUTES: Performed by: PHYSICIAN ASSISTANT

## 2020-01-28 PROCEDURE — 6370000000 HC RX 637 (ALT 250 FOR IP): Performed by: FAMILY MEDICINE

## 2020-01-28 PROCEDURE — 2580000003 HC RX 258: Performed by: THORACIC SURGERY (CARDIOTHORACIC VASCULAR SURGERY)

## 2020-01-28 PROCEDURE — 97116 GAIT TRAINING THERAPY: CPT

## 2020-01-28 PROCEDURE — C9113 INJ PANTOPRAZOLE SODIUM, VIA: HCPCS | Performed by: THORACIC SURGERY (CARDIOTHORACIC VASCULAR SURGERY)

## 2020-01-28 PROCEDURE — 94669 MECHANICAL CHEST WALL OSCILL: CPT

## 2020-01-28 PROCEDURE — 85027 COMPLETE CBC AUTOMATED: CPT

## 2020-01-28 PROCEDURE — 2709999900 HC NON-CHARGEABLE SUPPLY

## 2020-01-28 PROCEDURE — 99024 POSTOP FOLLOW-UP VISIT: CPT | Performed by: PHYSICIAN ASSISTANT

## 2020-01-28 PROCEDURE — 84100 ASSAY OF PHOSPHORUS: CPT

## 2020-01-28 PROCEDURE — 80048 BASIC METABOLIC PNL TOTAL CA: CPT

## 2020-01-28 PROCEDURE — 6370000000 HC RX 637 (ALT 250 FOR IP): Performed by: INTERNAL MEDICINE

## 2020-01-28 RX ORDER — PANTOPRAZOLE SODIUM 40 MG/1
40 TABLET, DELAYED RELEASE ORAL
Status: DISCONTINUED | OUTPATIENT
Start: 2020-01-29 | End: 2020-01-30 | Stop reason: HOSPADM

## 2020-01-28 RX ADMIN — Medication 10 ML: at 20:39

## 2020-01-28 RX ADMIN — OXYCODONE AND ACETAMINOPHEN 2 TABLET: 5; 325 TABLET ORAL at 15:41

## 2020-01-28 RX ADMIN — DOCUSATE SODIUM 100 MG: 100 CAPSULE, LIQUID FILLED ORAL at 08:48

## 2020-01-28 RX ADMIN — CLOPIDOGREL BISULFATE 75 MG: 75 TABLET ORAL at 08:47

## 2020-01-28 RX ADMIN — PANTOPRAZOLE SODIUM 40 MG: 40 INJECTION, POWDER, FOR SOLUTION INTRAVENOUS at 08:48

## 2020-01-28 RX ADMIN — DOCUSATE SODIUM 100 MG: 100 CAPSULE, LIQUID FILLED ORAL at 20:36

## 2020-01-28 RX ADMIN — METOPROLOL TARTRATE 12.5 MG: 25 TABLET ORAL at 20:36

## 2020-01-28 RX ADMIN — INSULIN LISPRO 1 UNITS: 100 INJECTION, SOLUTION INTRAVENOUS; SUBCUTANEOUS at 20:39

## 2020-01-28 RX ADMIN — METOPROLOL TARTRATE 12.5 MG: 25 TABLET ORAL at 08:48

## 2020-01-28 RX ADMIN — TAMSULOSIN HYDROCHLORIDE 0.4 MG: 0.4 CAPSULE ORAL at 08:48

## 2020-01-28 RX ADMIN — INSULIN LISPRO 2 UNITS: 100 INJECTION, SOLUTION INTRAVENOUS; SUBCUTANEOUS at 12:01

## 2020-01-28 RX ADMIN — SENNOSIDES 8.6 MG: 8.6 TABLET, FILM COATED ORAL at 20:35

## 2020-01-28 RX ADMIN — OXYBUTYNIN CHLORIDE 5 MG: 5 TABLET ORAL at 20:35

## 2020-01-28 RX ADMIN — ASPIRIN 81 MG: 81 TABLET, COATED ORAL at 08:47

## 2020-01-28 RX ADMIN — SODIUM BICARBONATE 1300 MG: 650 TABLET ORAL at 20:35

## 2020-01-28 RX ADMIN — Medication 10 ML: at 08:49

## 2020-01-28 RX ADMIN — LUBIPROSTONE 8 MCG: 8 CAPSULE, GELATIN COATED ORAL at 08:47

## 2020-01-28 RX ADMIN — SODIUM BICARBONATE 1300 MG: 650 TABLET ORAL at 08:47

## 2020-01-28 RX ADMIN — ATORVASTATIN CALCIUM 40 MG: 40 TABLET, FILM COATED ORAL at 20:35

## 2020-01-28 RX ADMIN — OXYBUTYNIN CHLORIDE 5 MG: 5 TABLET ORAL at 13:30

## 2020-01-28 RX ADMIN — OXYCODONE AND ACETAMINOPHEN 2 TABLET: 5; 325 TABLET ORAL at 05:44

## 2020-01-28 RX ADMIN — LUBIPROSTONE 8 MCG: 8 CAPSULE, GELATIN COATED ORAL at 17:31

## 2020-01-28 RX ADMIN — OXYBUTYNIN CHLORIDE 5 MG: 5 TABLET ORAL at 08:48

## 2020-01-28 ASSESSMENT — PAIN SCALES - GENERAL
PAINLEVEL_OUTOF10: 8
PAINLEVEL_OUTOF10: 8
PAINLEVEL_OUTOF10: 6
PAINLEVEL_OUTOF10: 8
PAINLEVEL_OUTOF10: 5
PAINLEVEL_OUTOF10: 0
PAINLEVEL_OUTOF10: 0

## 2020-01-28 ASSESSMENT — PAIN DESCRIPTION - PAIN TYPE
TYPE: ACUTE PAIN
TYPE: ACUTE PAIN;SURGICAL PAIN
TYPE: SURGICAL PAIN;ACUTE PAIN
TYPE: SURGICAL PAIN;ACUTE PAIN

## 2020-01-28 ASSESSMENT — PAIN DESCRIPTION - ORIENTATION
ORIENTATION: MID

## 2020-01-28 ASSESSMENT — PAIN DESCRIPTION - FREQUENCY
FREQUENCY: CONTINUOUS
FREQUENCY: INTERMITTENT
FREQUENCY: CONTINUOUS

## 2020-01-28 ASSESSMENT — PAIN DESCRIPTION - LOCATION
LOCATION: CHEST

## 2020-01-28 ASSESSMENT — PAIN DESCRIPTION - ONSET: ONSET: PROGRESSIVE

## 2020-01-28 NOTE — PROGRESS NOTES
prevention of coronary artery disease with aspirin, Plavix, metoprolol, and atorvastatin.   Okay to discharge from a cardiology standpoint    Rivera Kemp MD

## 2020-01-28 NOTE — PROGRESS NOTES
Protonix changed from IV to PO per [facility] approved policy. Basic Criteria (please refer to hospital policy for details):  1. functioning GI tract  2. tolerating PO/NG routine medications      Thank you.   Jeffry Baker, ChaparroD  Emergency Department and Critical Care Pharmacist  1/28/2020 2:57 PM

## 2020-01-28 NOTE — FLOWSHEET NOTE
01/28/20 1115   Provider Notification   Reason for Communication Review case   Provider Name Buffy Larsen   Provider Notification Physician Assistant   Method of Communication Face to face   Response In department   Notification Time [phone]   LAURA lacy, RN updated him on chest tube output overnight - orders to keep CTs in today and plan to remove tomorrow.

## 2020-01-28 NOTE — PROGRESS NOTES
Physical Therapy  Facility/Department: [facility]  Daily Treatment Note  NAME: Eyal Gallegos  : 1950  MRN: [id number]    Date of Service: 2020    Discharge Recommendations:  IP Rehab, Subacute/Skilled Nursing Facility        Assessment      Activity Tolerance  Activity Tolerance: Patient Tolerated treatment well;Patient limited by pain; Patient limited by fatigue     Patient Diagnosis(es): The encounter diagnosis was Chest pain, unspecified type. has a past medical history of Back pain, Diabetes mellitus ([address] and Kidney stone. has a past surgical history that includes back surgery; Ureter stent placement; Cystocopy (2019); Cystoscopy (N/A, 2019); and Coronary artery bypass graft (N/A, 2020). Restrictions  Restrictions/Precautions  Restrictions/Precautions: Fall Risk, Up as Tolerated  Required Braces or Orthoses?: Yes  Required Braces or Orthoses  Other: Heart Hugger Brace  Position Activity Restriction  Sternal Precautions: No Pushing, No Pulling  Sternal Precautions: post-op CABG x 2 - heart hugger on at all times  Other position/activity restrictions: martinez, cardiac precautions, chest tubes, R sided neck IV, pt is s/p CABG , dental soft diet/no caffeine, amb pt, up in chair for meals, elevate heels off bed AATM's, bed rest with BSC   Subjective   General  Chart Reviewed: Yes  Family / Caregiver Present: No  Subjective  Subjective: Patient is feeling very cold today and requests heated blanket upon completion of  ambulation. General Comment  Comments: Patient and BORIS Whitman agreeable to PT treatment.   Pain Screening  Patient Currently in Pain: Yes  Pain Assessment  Pain Assessment: 0-10  Pain Level: 6  Pain Type: Acute pain;Surgical pain  Pain Location: Chest  Pain Orientation: Mid  Pain Frequency: Continuous  Pain Onset: Progressive  Vital Signs  Patient Currently in Pain: Yes       Orientation     Cognition      Objective      Transfers  Stand to sit: Minimal

## 2020-01-28 NOTE — PROGRESS NOTES
(01/25/20 1958)     PRN Meds:oxyCODONE-acetaminophen **OR** oxyCODONE-acetaminophen, norepinephrine, EPINEPHrine infusion, nitroGLYCERIN, sodium chloride flush, ondansetron, glucose, dextrose, glucagon (rDNA), dextrose, albumin human, fentanNYL **OR** fentanNYL, LORazepam, acetaminophen, ipratropium-albuterol    Beta- Blocker:  Yes  Aspirin: Yes  Lovenox: No  GI: Yes  Plavix: Yes  Coumadin: No  Statin: Yes  ACE: No    Assessment/ Plan:  POD #3 CABG  Neuro NFD  HD stable  Puylmonary atelectasis ambulate IS  GI tolerating diet Bowel porotocol   consult nephrology BUN/Creatine up slightly hold lasix  D/C IJ  Keep chest tubes til tomorrow then d/c  Jeffries per urology  D/C planning      Sherline Primrose, PA

## 2020-01-28 NOTE — PROGRESS NOTES
12/15/19   Balbir Johnson MD   atorvastatin (LIPITOR) 20 MG tablet Take 1 tablet by mouth nightly 12/13/19   Michael Cronin MD   tamsulosin (FLOMAX) 0.4 MG capsule Take 1 capsule by mouth daily 12/14/19   Michael Cronin MD   metoprolol tartrate (LOPRESSOR) 50 MG tablet Take 1 tablet by mouth 2 times daily 12/13/19   Michael Cronin MD   amLODIPine (NORVASC) 10 MG tablet Take 1 tablet by mouth daily 12/14/19   Michael Cronin MD       Scheduled Meds:   metoprolol tartrate  12.5 mg Oral BID    atorvastatin  40 mg Oral Nightly    lubiprostone  8 mcg Oral BID WC    sodium bicarbonate  1,300 mg Oral BID    sodium chloride flush  10 mL Intravenous 2 times per day    pantoprazole  40 mg Intravenous Daily    And    sodium chloride (PF)  10 mL Intravenous Daily    aspirin  81 mg Oral Daily    clopidogrel  75 mg Oral Daily    docusate sodium  100 mg Oral BID    insulin lispro  0-12 Units Subcutaneous TID WC    insulin lispro  0-6 Units Subcutaneous Nightly    senna  1 tablet Oral BID    oxybutynin  5 mg Oral TID    tamsulosin  0.4 mg Oral Daily     Physical Exam:  Vitals:    01/28/20 0700 01/28/20 0800 01/28/20 0900 01/28/20 1000   BP: 120/78 118/76 (!) 138/94 126/76   Pulse: 77 79 88 92   Resp:  18     Temp:  97.7 °F (36.5 °C)     TempSrc:  Temporal     SpO2: 94% 96% 98% 96%   Weight:       Height:         I/O last 3 completed shifts: In: 1711 [P.O.:740; I.V.:500]  Out: 1425 [Urine:955; Drains:470]      General:  Awake, alert, not in distress. Appears to be stated age. HEENT: Atraumatic, normocephalic. Anicteric sclera. Pink and moist oral mucosa. Neck supple. No JVD. Chest: Bilateral air entry, clear to auscultation, no wheezing, rhonchi or rales. Cardiovascular: RRR, S1S2, no murmur, rub or gallop. No lower extremity edema. Abdomen: Soft, non tender to palpation. Musculoskeletal:    No cyanosis or clubbing. Integumentary: Pink, warm and dry. Free from rash or lesions.  Skin turgor normal.  CNS: Oriented to person, place and time. Speech clear. Face symmetrical. No tremor.      Data:    CBC:   Lab Results   Component Value Date    WBC 10.9 01/28/2020    HGB 9.8 (L) 01/28/2020    HCT 32.0 (L) 01/28/2020    MCV 95.0 01/28/2020     (L) 01/28/2020     BMP:    Lab Results   Component Value Date     01/28/2020     01/27/2020     01/27/2020    K 4.5 01/28/2020    K 4.5 01/27/2020    K 4.7 01/27/2020     (H) 01/28/2020     01/27/2020     (H) 01/27/2020    CO2 22 01/28/2020    CO2 21 01/27/2020    CO2 21 01/27/2020    BUN 49 (H) 01/28/2020    BUN 51 (H) 01/27/2020    BUN 51 (H) 01/27/2020    CREATININE 2.69 (H) 01/28/2020    CREATININE 2.83 (H) 01/27/2020    CREATININE 3.08 (H) 01/27/2020    GLUCOSE 122 (H) 01/28/2020    GLUCOSE 160 (H) 01/27/2020    GLUCOSE 161 (H) 01/27/2020     CMP:   Lab Results   Component Value Date     01/28/2020    K 4.5 01/28/2020     01/28/2020    CO2 22 01/28/2020    BUN 49 01/28/2020    CREATININE 2.69 01/28/2020    GLUCOSE 122 01/28/2020    CALCIUM 8.6 01/28/2020    PROT 6.3 01/23/2020    LABALBU 3.3 01/23/2020    BILITOT 0.31 01/23/2020    ALKPHOS 49 01/23/2020    AST 13 01/23/2020    ALT 7 01/23/2020      Hepatic:   Lab Results   Component Value Date    AST 13 01/23/2020    AST 16 12/06/2019    ALT 7 01/23/2020    ALT 9 12/06/2019    BILITOT 0.31 01/23/2020    BILITOT 0.31 12/06/2019    ALKPHOS 49 01/23/2020    ALKPHOS 47 12/06/2019     BNP: No results found for: BNP  Lipids:   Lab Results   Component Value Date    CHOL 177 01/23/2020    HDL 51 01/23/2020     INR:   Lab Results   Component Value Date    INR 1.0 01/28/2020    INR 1.0 01/27/2020    INR 1.1 01/26/2020     PTH: No results found for: PTH  Phosphorus:    Lab Results   Component Value Date    PHOS 3.8 01/28/2020     Ionized Calcium: No results found for: IONCA  Magnesium:   Lab Results   Component Value Date    MG 2.2 01/28/2020     Albumin:   Lab Results   Component

## 2020-01-28 NOTE — PROGRESS NOTES
Physical Therapy  Facility/Department: [facility]  Daily Treatment Note  NAME: Maria C Acevedo  : 1950  MRN: [id number]    Date of Service: 2020    Discharge Recommendations:  IP Rehab, Subacute/Skilled Nursing Facility        Assessment   Body structures, Functions, Activity limitations: Decreased functional mobility ; Decreased strength;Decreased endurance;Decreased vision/visual deficit; Decreased coordination;Decreased ADL status; Decreased sensation;Decreased ROM; Decreased balance; Increased pain  Assessment: pt progressing toward goals  Prognosis: Good  Decision Making: High Complexity  History: lives alone, recent CABG x 2, high pain level  Exam: LE ROM, strength, bed mobility, transfers, gait, balance  Clinical Presentation: evolving due to recent CABG  REQUIRES PT FOLLOW UP: Yes  Activity Tolerance  Activity Tolerance: Patient limited by endurance; Patient limited by pain; Patient limited by fatigue  Other Comments  Comments: Patient educated on the importance of posture during gait activities to promote functional dynamic activities and proper respiratory exchange. Patient Diagnosis(es): The encounter diagnosis was Chest pain, unspecified type. has a past medical history of Back pain, Diabetes mellitus ([facility] [address] and Kidney stone. has a past surgical history that includes back surgery; Ureter stent placement; Cystocopy (2019); Cystoscopy (N/A, 2019); and Coronary artery bypass graft (N/A, 2020). Restrictions  Restrictions/Precautions  Restrictions/Precautions: Fall Risk, Up as Tolerated  Required Braces or Orthoses?: Yes  Required Braces or Orthoses  Other: Heart Hugger Brace  Position Activity Restriction  Sternal Precautions: No Pushing, No Pulling, 5# Lifting Restrictions  Sternal Precautions: post-op CABG x 2 - heart hugger on at all times  Other position/activity restrictions:  Up with assist, chest tubes, heart hugger, martinez  Subjective   General  Chart Reviewed: Yes  Family / Caregiver Present: No  General Comment  Comments: Patient and RN Leelee Locke agreeable to PT treatment. Orientation  Orientation  Overall Orientation Status: Within Functional Limits  Cognition      Objective      Transfers  Sit to Stand: Minimal Assistance  Stand to sit: Minimal Assistance  Ambulation  Ambulation?: Yes  More Ambulation?: No  Ambulation 1  Surface: level tile  Device: Rolling Walker  Assistance: Minimal assistance  Quality of Gait: very slow with 3 standing rest breaks, heavy reliance of UE's on walker   Gait Deviations: Slow Jerilyn;Decreased step length;Decreased step height; Increased SILVER;Shuffles  Distance: 250 ft x1  Comments: Verbal cues to stay within a safe distance of walker  Stairs/Curb  Stairs?: No     Balance  Posture: Good  Sitting - Static: Good  Sitting - Dynamic: Fair;+  Standing - Static: Good  Standing - Dynamic: Good;-       AM-PAC Score     AM-PAC Inpatient Mobility without Stair Climbing Raw Score : 13 (01/28/20 1715)  AM-PAC Inpatient without Stair Climbing T-Scale Score : 38.96 (01/28/20 1715)  Mobility Inpatient CMS 0-100% Score: 58.44 (01/28/20 1715)  Mobility Inpatient without Stair CMS G-Code Modifier : CK (01/28/20 1715)       Goals  Short term goals  Time Frame for Short term goals: 12 visits  Short term goal 1: Pt to be Kathleen for bed mobility with following sternal precautions. Short term goal 2: Pt to be Kathleen for transfer with following sternal precautions and safe sequencing. Short term goal 3: Pt to amb 100 feet with LRAD vs no device with SBA as medical stability dictates. Short term goal 4: Pt to amb up/down 4 steps with B rails SBA, if returns directly to home.     Plan    Plan  Times per week: BID - 7 days/wk  Times per day: Twice a day  Current Treatment Recommendations: Strengthening, Transfer Training, Endurance Training, Neuromuscular Re-education, Patient/Caregiver Education & Training, ROM, Pain Management, Balance Training, Gait Training, Home Exercise Program, Functional Mobility Training, Stair training  Safety Devices  Type of devices: Nurse notified, Left in chair, Gait belt, Call light within reach, All fall risk precautions in place  Restraints  Initially in place: No     Therapy Time   Individual Concurrent Group Co-treatment   Time In  1637         Time Out  1706         Minutes  [address], [place]

## 2020-01-28 NOTE — PROGRESS NOTES
Coronary artery bypass graft (N/A, 1/25/2020). Restrictions  Restrictions/Precautions  Restrictions/Precautions: Fall Risk, Up as Tolerated  Required Braces or Orthoses?: Yes  Required Braces or Orthoses  Other: Heart Hugger Brace  Position Activity Restriction  Sternal Precautions: No Pushing, No Pulling, 5# Lifting Restrictions  Sternal Precautions: post-op CABG x 2 - heart hugger on at all times  Other position/activity restrictions: Up with assist, IV, chest tubes, heart hugger, martinez  Subjective   General  Chart Reviewed: Yes  Patient assessed for rehabilitation services?: Yes  Response to previous treatment: Patient with no complaints from previous session  Family / Caregiver Present: No      Orientation  Orientation  Overall Orientation Status: Within Functional Limits  Objective             Balance  Sitting Balance: Stand by assistance  Standing Balance: Contact guard assistance  Functional Mobility  Functional - Mobility Device: Rolling Walker  Activity: Other  Assist Level: Minimal assistance  Functional Mobility Comments: Max verbal instruction/tactile assist to slow down movements/pace self, upright posture, RW safety, pursed lip breathing as well as awareness/asssist with lines to increase safety/reduce falls.       Transfers  Sit to stand: Minimal assistance  Stand to sit: Minimal assistance  Transfer Comments: VC for use of heart hugger and pushing up from surface seated on with poor carryover                       Cognition  Overall Cognitive Status: Exceptions  Safety Judgement: Decreased awareness of need for safety;Decreased awareness of need for assistance  Problem Solving: Assistance required to identify errors made;Assistance required to correct errors made;Decreased awareness of errors  Insights: Decreased awareness of deficits  Initiation: Requires cues for some  Sequencing: Requires cues for some                                         Plan   Plan  Times per week: 4-5x/week 1-2x/day as roma  Current Treatment Recommendations: Strengthening, Balance Training, Neuromuscular Re-education, Safety Education & Training, Self-Care / ADL, Endurance Training, Pain Management, Equipment Evaluation, Education, & procurement, Home Management Training, Patient/Caregiver Education & Training, Functional Mobility Training    AM-PAC Score        AM-PAC Inpatient Daily Activity Raw Score: 14 (01/28/20 1417)  AM-PAC Inpatient ADL T-Scale Score : 33.39 (01/28/20 1417)  ADL Inpatient CMS 0-100% Score: 59.67 (01/28/20 1417)  ADL Inpatient CMS G-Code Modifier : CK (01/28/20 1417)    Goals  Short term goals  Time Frame for Short term goals: by discharge, pt to demo   Short term goal 1: bed mob tasks with use of rail as needed to SUP. Short term goal 2: I with BUE cardiac HEP with hand outs as needed to maintain functional use. Short term goal 3: UB ADL to set up and LB ADL to min assist with use of AD/AE as needed. Short term goal 4: toileting tasks with use of BSC/grab bar and AD as needed to SBA. Short term goal 5: ADL transfers and functional mob with AD as needed to SBA level. Long term goals  Long term goal 1: Pt to stand with SUP with AD as needed roma > 7 mins as able to reduce falls with self care tasks. Long term goal 2: Pt to be I with EC/WS and fall prevention tech as well as AE/DME recommendations with hand outs as needed. Patient Goals   Patient goals : return home        Therapy Time   Individual Concurrent Group Co-treatment   Time In 1667         Time Out 1324         Minutes 26              Upon writer exit, call light within reach, pt retired to chair. All lines intact and patient positioned comfortably. All patient needs addressed prior to ending therapy session. Chart reviewed prior to treatment and patient is agreeable for therapy. RN reports patient is medically stable for therapy treatment this date.     ARSH Weller

## 2020-01-28 NOTE — PROGRESS NOTES
[address] Hospitalist        1/28/2020   12:04 PM    Name:  Mandy Carlos  MRN:    [id number]     Acct:     [de-identified]   Room:  2030/2030-01  IP Day: 4     Admit Date: 1/22/2020  3:03 PM  PCP: Reema Santana MD    C/C:   Chief Complaint   Patient presents with    Chest Pain       Assessment:      · NSTEMI/Unstable angina status post coronary bypass graft x 2 vessels on 01/25/2020  · Chronic a artery disease/positive cardiac stress test   · Essential hypertension   · CKD stage 3   · Hyperlipidemia, mixed   · Chronic COPD, not in exacerbation   · Chronic pain syndrome  · Anxiety disorder   · BPH/Urge incontinence  · Constipation        Plan:        · Patient is admitted to [facility]   · Oxygen keep SpO2 more than the percent   · Telemetry  · Check vital signs closely   · CBC BMP daily  · Continue aspirin Lipitor, Lopressor, Norvasc   · Patient not on Ace inhibitors due to renal insufficiency  · Continue bowel regimen with Colace and senna   · Continue Flomax /Ditropan   · Pain control with narcotics   · Cardiology/CT surgery on board   · Nephrology on board   · Continue to monitor  · Further recommendation to follow  · DVT and GI prophylaxis  · Continue to monitor/telemetry/CBC with differential daily/BMP daily      Subjective:     Patient seen and examined at bedside. No overnight events. No acute complaints today. Afebrile  Pt. Denies any, SOB, palpitation, HA, dizziness, chills, cough, cold, changes in urination, BM or skin changes or any pain. Patient tells me that he still gets some pain when he walks  Overall he tells me that he wants to sleep at this time a did not had good night sleep   Patient pain is well controlled       ROS:  A 10 point system reviewed and negative otherwise mentioned above.         Physical Examination:      Vitals:  /71   Pulse 82   Temp 98 °F (36.7 °C) (Temporal)   Resp 16   Ht 5' 7\" (1.702 m)   Wt 169 lb 14.4 oz (77.1 kg)   SpO2 96%   BMI oxyCODONE-acetaminophen (PERCOCET) 5-325 MG per tablet 1 tablet, 1 tablet, Oral, Q4H PRN **OR** oxyCODONE-acetaminophen (PERCOCET) 5-325 MG per tablet 2 tablet, 2 tablet, Oral, Q4H PRN, Balbir Johnson MD, 2 tablet at 01/28/20 0544    sodium bicarbonate tablet 1,300 mg, 1,300 mg, Oral, BID, Abbie West MD, 1,300 mg at 01/28/20 0847    norepinephrine (LEVOPHED) 16 mg in dextrose 5 % 250 mL infusion, 0.01 mcg/kg/min, Intravenous, Continuous PRN, Vinicio Talamantes MD    EPINEPHrine (EPINEPHrine HCL) 5 mg in dextrose 5 % 250 mL infusion, 0.01 mcg/kg/min, Intravenous, Continuous PRN, Vinicio Talamantes MD    nitroGLYCERIN 50 mg in dextrose 5% 250 mL infusion, 5 mcg/min, Intravenous, Continuous PRN, Vinicio Talamantes MD    sodium chloride flush 0.9 % injection 10 mL, 10 mL, Intravenous, 2 times per day, Vinicio Talamantes MD, 10 mL at 01/28/20 0849    sodium chloride flush 0.9 % injection 10 mL, 10 mL, Intravenous, PRN, Vinicio Talamantes MD    ondansetron [facility]) injection 4 mg, 4 mg, Intravenous, Q8H PRN, Vinicio Talamantes MD, 4 mg at 01/26/20 0800    insulin regular (HUMULIN R;NOVOLIN R) 100 Units in sodium chloride 0.9 % 100 mL infusion, 1 Units/hr, Intravenous, Continuous, Vinicio Talamantes MD, Stopped at 01/26/20 0900    glucose (GLUTOSE) 40 % oral gel 15 g, 15 g, Oral, PRN, Vinicio Talamantes MD    dextrose 50 % IV solution, 12.5 g, Intravenous, PRN, Vinicio Talamantes MD    glucagon (rDNA) injection 1 mg, 1 mg, Intramuscular, PRN, Vinicio Talamantes MD    dextrose 5 % solution, 100 mL/hr, Intravenous, PRN, Vinicio Talamantes MD    pantoprazole (PROTONIX) injection 40 mg, 40 mg, Intravenous, Daily, 40 mg at 01/28/20 0848 **AND** sodium chloride (PF) 0.9 % injection 10 mL, 10 mL, Intravenous, Daily, Vinicio Talamantes MD, 10 mL at 01/28/20 0849    aspirin EC tablet 81 mg, 81 mg, Oral, Daily, Vinicio Talamantes MD, 81 mg at 01/28/20 0847    clopidogrel (PLAVIX) tablet 75 mg, 75 mg, Oral, Daily, Fransisco Flood

## 2020-01-29 LAB
ANION GAP SERPL CALCULATED.3IONS-SCNC: 8 MMOL/L (ref 9–17)
BUN BLDV-MCNC: 49 MG/DL (ref 8–23)
BUN/CREAT BLD: 20 (ref 9–20)
CALCIUM SERPL-MCNC: 8.5 MG/DL (ref 8.6–10.4)
CHLORIDE BLD-SCNC: 108 MMOL/L (ref 98–107)
CO2: 23 MMOL/L (ref 20–31)
CREAT SERPL-MCNC: 2.47 MG/DL (ref 0.7–1.2)
GFR AFRICAN AMERICAN: 32 ML/MIN
GFR NON-AFRICAN AMERICAN: 26 ML/MIN
GFR SERPL CREATININE-BSD FRML MDRD: ABNORMAL ML/MIN/{1.73_M2}
GFR SERPL CREATININE-BSD FRML MDRD: ABNORMAL ML/MIN/{1.73_M2}
GLUCOSE BLD-MCNC: 141 MG/DL (ref 75–110)
GLUCOSE BLD-MCNC: 142 MG/DL (ref 75–110)
GLUCOSE BLD-MCNC: 164 MG/DL (ref 75–110)
GLUCOSE BLD-MCNC: 197 MG/DL (ref 70–99)
HCT VFR BLD CALC: 31.5 % (ref 40.7–50.3)
HEMOGLOBIN: 9.7 G/DL (ref 13–17)
INR BLD: 1
MAGNESIUM: 2 MG/DL (ref 1.6–2.6)
MCH RBC QN AUTO: 29.1 PG (ref 25.2–33.5)
MCHC RBC AUTO-ENTMCNC: 30.8 G/DL (ref 28.4–34.8)
MCV RBC AUTO: 94.6 FL (ref 82.6–102.9)
NRBC AUTOMATED: 0 PER 100 WBC
PDW BLD-RTO: 14.4 % (ref 11.8–14.4)
PLATELET # BLD: 145 K/UL (ref 138–453)
PMV BLD AUTO: 11.1 FL (ref 8.1–13.5)
POTASSIUM SERPL-SCNC: 4.2 MMOL/L (ref 3.7–5.3)
PROTHROMBIN TIME: 10.1 SEC (ref 9.7–11.6)
RBC # BLD: 3.33 M/UL (ref 4.21–5.77)
SODIUM BLD-SCNC: 139 MMOL/L (ref 135–144)
WBC # BLD: 9.9 K/UL (ref 3.5–11.3)

## 2020-01-29 PROCEDURE — 80048 BASIC METABOLIC PNL TOTAL CA: CPT

## 2020-01-29 PROCEDURE — 6370000000 HC RX 637 (ALT 250 FOR IP): Performed by: THORACIC SURGERY (CARDIOTHORACIC VASCULAR SURGERY)

## 2020-01-29 PROCEDURE — 6360000002 HC RX W HCPCS: Performed by: FAMILY MEDICINE

## 2020-01-29 PROCEDURE — 2060000000 HC ICU INTERMEDIATE R&B

## 2020-01-29 PROCEDURE — 97535 SELF CARE MNGMENT TRAINING: CPT

## 2020-01-29 PROCEDURE — 82947 ASSAY GLUCOSE BLOOD QUANT: CPT

## 2020-01-29 PROCEDURE — 85610 PROTHROMBIN TIME: CPT

## 2020-01-29 PROCEDURE — 6370000000 HC RX 637 (ALT 250 FOR IP): Performed by: FAMILY MEDICINE

## 2020-01-29 PROCEDURE — 6370000000 HC RX 637 (ALT 250 FOR IP): Performed by: INTERNAL MEDICINE

## 2020-01-29 PROCEDURE — 97530 THERAPEUTIC ACTIVITIES: CPT

## 2020-01-29 PROCEDURE — 85027 COMPLETE CBC AUTOMATED: CPT

## 2020-01-29 PROCEDURE — 97116 GAIT TRAINING THERAPY: CPT

## 2020-01-29 PROCEDURE — 2580000003 HC RX 258: Performed by: THORACIC SURGERY (CARDIOTHORACIC VASCULAR SURGERY)

## 2020-01-29 PROCEDURE — 83735 ASSAY OF MAGNESIUM: CPT

## 2020-01-29 RX ADMIN — DOCUSATE SODIUM 100 MG: 100 CAPSULE, LIQUID FILLED ORAL at 19:54

## 2020-01-29 RX ADMIN — ATORVASTATIN CALCIUM 40 MG: 40 TABLET, FILM COATED ORAL at 19:55

## 2020-01-29 RX ADMIN — LORAZEPAM 0.5 MG: 2 INJECTION, SOLUTION INTRAMUSCULAR; INTRAVENOUS at 01:17

## 2020-01-29 RX ADMIN — OXYBUTYNIN CHLORIDE 5 MG: 5 TABLET ORAL at 14:23

## 2020-01-29 RX ADMIN — Medication 10 ML: at 08:36

## 2020-01-29 RX ADMIN — ASPIRIN 81 MG: 81 TABLET, COATED ORAL at 08:37

## 2020-01-29 RX ADMIN — SENNOSIDES 8.6 MG: 8.6 TABLET, FILM COATED ORAL at 19:55

## 2020-01-29 RX ADMIN — SODIUM BICARBONATE 1300 MG: 650 TABLET ORAL at 08:37

## 2020-01-29 RX ADMIN — INSULIN LISPRO 1 UNITS: 100 INJECTION, SOLUTION INTRAVENOUS; SUBCUTANEOUS at 19:55

## 2020-01-29 RX ADMIN — OXYCODONE AND ACETAMINOPHEN 2 TABLET: 5; 325 TABLET ORAL at 01:17

## 2020-01-29 RX ADMIN — DOCUSATE SODIUM 100 MG: 100 CAPSULE, LIQUID FILLED ORAL at 08:37

## 2020-01-29 RX ADMIN — OXYCODONE AND ACETAMINOPHEN 2 TABLET: 5; 325 TABLET ORAL at 17:37

## 2020-01-29 RX ADMIN — TAMSULOSIN HYDROCHLORIDE 0.4 MG: 0.4 CAPSULE ORAL at 08:36

## 2020-01-29 RX ADMIN — SODIUM BICARBONATE 1300 MG: 650 TABLET ORAL at 19:55

## 2020-01-29 RX ADMIN — OXYBUTYNIN CHLORIDE 5 MG: 5 TABLET ORAL at 08:37

## 2020-01-29 RX ADMIN — PANTOPRAZOLE SODIUM 40 MG: 40 TABLET, DELAYED RELEASE ORAL at 05:58

## 2020-01-29 RX ADMIN — METOPROLOL TARTRATE 12.5 MG: 25 TABLET ORAL at 08:37

## 2020-01-29 RX ADMIN — INSULIN LISPRO 2 UNITS: 100 INJECTION, SOLUTION INTRAVENOUS; SUBCUTANEOUS at 08:37

## 2020-01-29 RX ADMIN — METOPROLOL TARTRATE 12.5 MG: 25 TABLET ORAL at 19:55

## 2020-01-29 RX ADMIN — LUBIPROSTONE 8 MCG: 8 CAPSULE, GELATIN COATED ORAL at 16:50

## 2020-01-29 RX ADMIN — SENNOSIDES 8.6 MG: 8.6 TABLET, FILM COATED ORAL at 08:37

## 2020-01-29 RX ADMIN — OXYCODONE AND ACETAMINOPHEN 2 TABLET: 5; 325 TABLET ORAL at 05:29

## 2020-01-29 RX ADMIN — Medication 10 ML: at 22:42

## 2020-01-29 RX ADMIN — LORAZEPAM 0.5 MG: 2 INJECTION, SOLUTION INTRAMUSCULAR; INTRAVENOUS at 22:42

## 2020-01-29 RX ADMIN — CLOPIDOGREL BISULFATE 75 MG: 75 TABLET ORAL at 08:36

## 2020-01-29 RX ADMIN — LUBIPROSTONE 8 MCG: 8 CAPSULE, GELATIN COATED ORAL at 08:37

## 2020-01-29 RX ADMIN — OXYBUTYNIN CHLORIDE 5 MG: 5 TABLET ORAL at 19:55

## 2020-01-29 ASSESSMENT — PAIN SCALES - GENERAL
PAINLEVEL_OUTOF10: 10
PAINLEVEL_OUTOF10: 3
PAINLEVEL_OUTOF10: 8
PAINLEVEL_OUTOF10: 6
PAINLEVEL_OUTOF10: 0
PAINLEVEL_OUTOF10: 8
PAINLEVEL_OUTOF10: 0
PAINLEVEL_OUTOF10: 0

## 2020-01-29 ASSESSMENT — PAIN DESCRIPTION - ONSET: ONSET: ON-GOING

## 2020-01-29 ASSESSMENT — PAIN DESCRIPTION - PAIN TYPE
TYPE: SURGICAL PAIN
TYPE: ACUTE PAIN;SURGICAL PAIN

## 2020-01-29 ASSESSMENT — PAIN DESCRIPTION - FREQUENCY: FREQUENCY: INTERMITTENT

## 2020-01-29 ASSESSMENT — PAIN DESCRIPTION - LOCATION
LOCATION: CHEST
LOCATION: CHEST

## 2020-01-29 NOTE — PROGRESS NOTES
Nephrology progress note    Reason for Consult:  Elevated creatinine    Requesting Physician:  Triston Mcdonald MD    Interval history:  Creatinine continued to improved towards baseline. No new complaints. HISTORY OF PRESENT ILLNESS:    The patient is a 77 y.o. male who presents with chest pain, unstable angina, NSTEMI. He underwent cardiac cath earlier today, which showed severe multiple vessel CAD. Received 60mL of contrast.  Underlying CKD 3 with baseline creatinine around 2. Creatinine 2.25 yesterday and 2.33 this am. Electrolytes ok. Admitted last month for obstructing right ureteric calculus and hydronephrosis. Pt underwent cysto, pyelogram and removal of nephrolith at that time. SBP trending 120-150s./. Hx of HTN. Home meds include norvasc, metoprolol. However, he states he had stopped taking multiple medications in recently due to c/o lightheadedness, dizziness, and feeling that they were making him more confused. He states he does not know which medications he has been taking recently. He denies CP, SOB, fever currently. C/o always feeling cold, but denies chills. Denies edema. Prior to Admission medications    Medication Sig Start Date End Date Taking? Authorizing Provider   psyllium (KONSYL) 28.3 % PACK Take 1 packet by mouth daily   Yes Historical Provider, MD   aspirin 81 MG chewable tablet Take 1 tablet by mouth daily 12/16/19  Yes Triston Mcdonald MD   albuterol-ipratropium (COMBIVENT RESPIMAT)  MCG/ACT AERS inhaler Inhale 1 puff into the lungs every 6 hours 12/13/19  Yes Triston Mcdonald MD   morphine (MS CONTIN) 15 MG extended release tablet Take 15 mg by mouth 2 times daily as needed for Pain.   11/7/19  Yes Miguelina RICHMOND Ice   sodium bicarbonate 650 MG tablet Take 1 tablet by mouth 2 times daily 12/15/19   Triston Mcdonald MD   oxybutynin (DITROPAN) 5 MG tablet Take 1 tablet by mouth 3 times daily 12/15/19   Triston Mcdonald MD   atorvastatin (LIPITOR) 20 MG tablet Take 1 tablet by mouth nightly 12/13/19   Balbir Johnson MD   tamsulosin (FLOMAX) 0.4 MG capsule Take 1 capsule by mouth daily 12/14/19   Osmar Coleman MD   metoprolol tartrate (LOPRESSOR) 50 MG tablet Take 1 tablet by mouth 2 times daily 12/13/19   Osmar Colemna MD   amLODIPine (NORVASC) 10 MG tablet Take 1 tablet by mouth daily 12/14/19   Osmar Coleman MD       Scheduled Meds:   pantoprazole  40 mg Oral QAM AC    metoprolol tartrate  12.5 mg Oral BID    atorvastatin  40 mg Oral Nightly    lubiprostone  8 mcg Oral BID WC    sodium bicarbonate  1,300 mg Oral BID    sodium chloride flush  10 mL Intravenous 2 times per day    aspirin  81 mg Oral Daily    clopidogrel  75 mg Oral Daily    docusate sodium  100 mg Oral BID    insulin lispro  0-12 Units Subcutaneous TID WC    insulin lispro  0-6 Units Subcutaneous Nightly    senna  1 tablet Oral BID    oxybutynin  5 mg Oral TID    tamsulosin  0.4 mg Oral Daily     Physical Exam:  Vitals:    01/29/20 0000 01/29/20 0400 01/29/20 0800 01/29/20 1200   BP:   115/81 109/77   Pulse:   79 72   Resp:  16 16 18   Temp: 99.4 °F (37.4 °C) 97.2 °F (36.2 °C) 97.7 °F (36.5 °C) 97.6 °F (36.4 °C)   TempSrc: Temporal Temporal Temporal Temporal   SpO2:   96% 95%   Weight:  168 lb (76.2 kg)     Height:         I/O last 3 completed shifts: In: 600 [P.O.:600]  Out: 1330 [NNFTD:4452; Drains:290]      General:  Awake, alert, not in distress. Appears to be stated age. HEENT: Atraumatic, normocephalic. Anicteric sclera. Pink and moist oral mucosa. Neck supple. No JVD. Chest: Bilateral air entry, clear to auscultation, no wheezing, rhonchi or rales. Cardiovascular: RRR, S1S2, no murmur, rub or gallop. No lower extremity edema. Abdomen: Soft, non tender to palpation. Musculoskeletal:    No cyanosis or clubbing. Integumentary: Pink, warm and dry. Free from rash or lesions. Skin turgor normal.  CNS: Oriented to person, place and time. Speech clear. Face symmetrical. No tremor. @LABNT(CKTOTAL:3,CKMB:3,TROPONINI:3)       URINE:)No results found for: Roddy Duron    Radiology:   Reviewed. Assessment:  1. CKD stage 3B-creatinine is slightly better today. 2. Hypertension. 3. Proteinuria (MACR 892mg/g 12/2019). 4. NSTEMI s/p cardiac cath 1/23. 5. Recent nephrolithiasis (12/2019)-underwent cysto, pyelogram and removal of nephrolith at that time. 6.  Borderline hyperkalemia. 7. S/p CABG. 8. Metabolic acidosis. 9. Pulmonary congestion. Plan:  Okay to discontinue Jeffries   Continue dose of sodium bicarbonate  Lasix as needed  Rest of electrolytes ok. Continue Metoprolol. Holding Norvasc for now. Monitor BP. Low K diet. Avoid nephrotoxic drugs, NSAIDs, fleet's enemas, and IV contrast exposure. We will follow closely.       Electronically signed by Sheri Rojas MD  on 1/29/2020 at 1:40 PM

## 2020-01-29 NOTE — PROGRESS NOTES
Section of Cardiology  Progress Note      Date:  1/29/2020  Patient: Mandy Carlos  Admission:  1/22/2020  3:03 PM  Admit DX: Chest pain [R07.9]  NSTEMI (non-ST elevated myocardial infarction) [facility]) [I21.4]  Age:  77 y.o., 1950     LOS: 5 days     Reason for evaluation:   valvular disease and coronary artery disease, post coronary bypass surgery      SUBJECTIVE:     The patient was seen and examined. Notes and labs reviewed. He is sitting up in a chair. He is sleepy but has no complaints. No chest pain,or shortness of breath. He has no new issues. OBJECTIVE:      EXAM:   Vitals:    VITALS:  /78   Pulse 84   Temp 97.2 °F (36.2 °C) (Temporal)   Resp 16   Ht 5' 7\" (1.702 m)   Wt 168 lb (76.2 kg)   SpO2 96%   BMI 26.31 kg/m²   24HR INTAKE/OUTPUT:      Intake/Output Summary (Last 24 hours) at 1/29/2020 075  Last data filed at 1/29/2020 0640  Gross per 24 hour   Intake 600 ml   Output 1330 ml   Net -730 ml       CONSTITUTIONAL:sleepy but in  no acute distress  HEENT: Normal jugular venous pulsations  LUNGS: Diminished in bilateral lower lobes otherwise clear, nonlabored  CARDIOVASCULAR: Regular rate and rhythm, systolic murmur  SKIN: Warm and dry.   EXTREMITIES: Trace bilateral lower extremity edema    current Inpatient Medications:   pantoprazole  40 mg Oral QAM AC    metoprolol tartrate  12.5 mg Oral BID    atorvastatin  40 mg Oral Nightly    lubiprostone  8 mcg Oral BID     sodium bicarbonate  1,300 mg Oral BID    sodium chloride flush  10 mL Intravenous 2 times per day    aspirin  81 mg Oral Daily    clopidogrel  75 mg Oral Daily    docusate sodium  100 mg Oral BID    insulin lispro  0-12 Units Subcutaneous TID WC    insulin lispro  0-6 Units Subcutaneous Nightly    senna  1 tablet Oral BID    oxybutynin  5 mg Oral TID    tamsulosin  0.4 mg Oral Daily       IV Infusions (if any):   norepinephrine      EPINEPHrine infusion      nitroGLYCERIN      insulin Stopped (01/26/20 0900)    dextrose      propofol Stopped (01/25/20 2006)    dexmedetomidine (PRECEDEX) IV infusion 0.5 mcg/kg/hr (01/25/20 1958)       Diagnostics:   Telemetry: Sinus rhythm    Labs:   CBC:  Recent Labs     01/27/20  0601 01/28/20 0622   WBC 12.6* 10.9   HGB 9.8* 9.8*   HCT 32.0* 32.0*   * 122*     Magnesium:  Recent Labs     01/27/20  0601 01/28/20 0622   MG 2.3 2.2     BMP:  Recent Labs     01/27/20  1854 01/28/20 0622    142   K 4.5 4.5   CALCIUM 8.7 8.6   CO2 21 22   BUN 51* 49*   CREATININE 2.83* 2.69*   LABGLOM 22* 24*   GLUCOSE 160* 122*     BNP:  No results for input(s): BNP, PROBNP in the last 72 hours. PT/INR:  Recent Labs     01/27/20  0601 01/28/20 0622   PROTIME 10.7 9.8   INR 1.0 1.0     APTT:  No results for input(s): APTT in the last 72 hours. CARDIAC ENZYMES:  No results for input(s): MYOGLOBIN, CKTOTAL, CKMB, CKMBINDEX, TROPHS, TROPONINT in the last 72 hours. FASTING LIPID PANEL:  Lab Results   Component Value Date    HDL 51 01/23/2020    TRIG 88 01/23/2020     LIVER PROFILE:  No results for input(s): AST, ALT, LABALBU, ALKPHOS, BILITOT, BILIDIR, IBILI, PROT, GLOB, ALBUMIN in the last 72 hours. ASSESSMENT:    · NSTEMI with unstable angina s/p post two-vessel coronary artery bypass graft surgery with LIMA to the LAD and saphenous vein graft to the ramus 1/25/2020-stable  · CAD with severe multivessel CAD and transradial cardiac catheterization 1/23/2020  · Mild aortic stenosis with mild LV dysfunction on echocardiogram done December 2019, no gradient across the aortic valve on cardiac catheterization done 1/23/2020  · Renal insufficiency, managed by others  · Diabetes, managed by others  · History of carotid disease and left subclavian stenosis  · Dyslipidemia   · Hallucinations since last admission, managed by others  · IVP dye allergy with facial swelling about 30 years ago    PLAN:   1. Patient is improving from a cardiology standpoint.   Continue aggressive medical therapy for secondary prevention of coronary artery disease with aspirin, Plavix, metoprolol, and atorvastatin. 2. Chest tubes still in. This will be removed today as per ct surgery  3.    Okay to discharge from a cardiology standpoint    April Rubio MD

## 2020-01-29 NOTE — PROGRESS NOTES
Minimal Assistance  Stand Pivot Transfers: Minimal Assistance  Ambulation  Ambulation?: Yes  More Ambulation?: No  Ambulation 1  Surface: level tile  Device: Rolling Walker  Other Apparatus: Wheelchair follow  Assistance: Minimal assistance  Quality of Gait: good pattern, very slow, slight LOB at times with pt being able to correct  Gait Deviations: Slow Jerilyn;Decreased step length;Decreased step height; Increased SILVER;Shuffles  Distance: 200 feet x1  Comments: Patient was very tired and fatigued so a second person was used for wheelchair follow. Patient also has 2 chest tubes and martinez Cath   Stairs/Curb  Stairs?: No     Balance  Posture: Good  Sitting - Static: Good  Sitting - Dynamic: Fair;+  Standing - Static: Good  Standing - Dynamic: Good;-  Exercises  Comments: HONORIO LE to promote joint kinematics assist with ambulation and functional actiivities and maintain joint ROM. AM-PAC Score  AM-PAC Inpatient Mobility Raw Score : 17 (01/29/20 1318)  AM-PAC Inpatient T-Scale Score : 42.13 (01/29/20 1318)  Mobility Inpatient CMS 0-100% Score: 50.57 (01/29/20 1318)  Mobility Inpatient CMS G-Code Modifier : CK (01/29/20 1318)          Goals  Short term goals  Time Frame for Short term goals: 12 visits  Short term goal 1: Pt to be Kathleen for bed mobility with following sternal precautions. Short term goal 2: Pt to be Kathleen for transfer with following sternal precautions and safe sequencing. Short term goal 3: Pt to amb 100 feet with LRAD vs no device with SBA as medical stability dictates. Short term goal 4: Pt to amb up/down 4 steps with B rails SBA, if returns directly to home.     Plan    Plan  Times per week: BID - 7 days/wk  Times per day: Twice a day  Current Treatment Recommendations: Strengthening, Transfer Training, Endurance Training, Neuromuscular Re-education, Patient/Caregiver Education & Training, ROM, Pain Management, Balance Training, Gait Training, Home Exercise Program, Functional Mobility Training,

## 2020-01-29 NOTE — PROGRESS NOTES
[address] Hospitalist        2020   4:14 PM    Name:  Jonathan Jones  MRN:    [id number]     Acct:     [de-identified]   Room:    IP Day: 5     Admit Date: 2020  3:03 PM  PCP: Nelly Pineda MD    C/C:   Chief Complaint   Patient presents with    Chest Pain       Assessment:      · NSTEMI/Unstable angina status post coronary bypass graft x 2 vessels on 2020  · Chronic a artery disease/positive cardiac stress test   · Essential hypertension   · CKD stage 3   · Hyperlipidemia, mixed   · Chronic COPD, not in exacerbation   · Chronic pain syndrome  · Anxiety disorder   · BPH/Urge incontinence  · Constipation        Plan:        · Patient is [facility] to [facility]   · Oxygen keep SpO2 more than the percent   · Telemetry  · Check vital signs closely   · CBC BMP daily  · Continue aspirin Lipitor, Lopressor, Norvasc   · Patient not on Ace inhibitors due to renal insufficiency  · Continue bowel regimen with Colace and senna   · Continue Flomax /Ditropan   · Pain control with narcotics   · Cardiology/CT surgery on board   · Nephrology on board   · DVT and GI prophylaxis  · Discharge planning for tomorrow  · Encourage ambulation  · PT OT  ·  for discharge planning patient will benefit from skilled nursing facility stay      Subjective:     Patient seen and examined at bedside, patient denies any chest pain no nausea vomiting diarrhea  Patient had a good night sleep overall feeling better  Working with physical therapy  Chest tubes are out  Overall does not have any complaint denies any shortness of breath      ROS:  A 10 point system reviewed and negative otherwise mentioned above. Physical Examination:      Vitals:  /77   Pulse 72   Temp 98 °F (36.7 °C) (Temporal)   Resp 18   Ht 5' 7\" (1.702 m)   Wt 168 lb (76.2 kg)   SpO2 95%   BMI 26.31 kg/m²   Temp (24hrs), Av.9 °F (36.6 °C), Min:97.2 °F (36.2 °C), Max:99.4 °F (37.4 °C)    Weight:    Wt tablet, Oral, Q4H PRN **OR** oxyCODONE-acetaminophen (PERCOCET) 5-325 MG per tablet 2 tablet, 2 tablet, Oral, Q4H PRN, Balbir Johnson MD, 2 tablet at 01/29/20 0529    sodium bicarbonate tablet 1,300 mg, 1,300 mg, Oral, BID, Abbie West MD, 1,300 mg at 01/29/20 0837    norepinephrine (LEVOPHED) 16 mg in dextrose 5 % 250 mL infusion, 0.01 mcg/kg/min, Intravenous, Continuous PRN, Gilford Ivans, MD    EPINEPHrine (EPINEPHrine HCL) 5 mg in dextrose 5 % 250 mL infusion, 0.01 mcg/kg/min, Intravenous, Continuous PRN, Gilford Ivans, MD    nitroGLYCERIN 50 mg in dextrose 5% 250 mL infusion, 5 mcg/min, Intravenous, Continuous PRN, Gilford Ivans, MD    sodium chloride flush 0.9 % injection 10 mL, 10 mL, Intravenous, 2 times per day, Gilford Ivans, MD, 10 mL at 01/29/20 0836    sodium chloride flush 0.9 % injection 10 mL, 10 mL, Intravenous, PRN, Gilford Ivans, MD    ondansetron [facility]) injection 4 mg, 4 mg, Intravenous, Q8H PRN, Gilford Ivans, MD, 4 mg at 01/26/20 0800    insulin regular (HUMULIN R;NOVOLIN R) 100 Units in sodium chloride 0.9 % 100 mL infusion, 1 Units/hr, Intravenous, Continuous, Gilford Ivans, MD, Stopped at 01/26/20 0900    glucose (GLUTOSE) 40 % oral gel 15 g, 15 g, Oral, PRN, Gilford Ivans, MD    dextrose 50 % IV solution, 12.5 g, Intravenous, PRN, Gilford Ivans, MD    glucagon (rDNA) injection 1 mg, 1 mg, Intramuscular, PRN, Gilford Ivans, MD    dextrose 5 % solution, 100 mL/hr, Intravenous, PRN, Gilford Ivans, MD    aspirin EC tablet 81 mg, 81 mg, Oral, Daily, Gilford Ivans, MD, 81 mg at 01/29/20 2087    clopidogrel (PLAVIX) tablet 75 mg, 75 mg, Oral, Daily, Gilford Ivans, MD, 75 mg at 01/29/20 0836    albumin human 5 % IV solution 25 g, 25 g, Intravenous, PRN, Gilford Ivans, MD, 25 g at 01/26/20 1320    docusate sodium (COLACE) capsule 100 mg, 100 mg, Oral, BID, Gilford Ivans, MD, 100 mg at 01/29/20 0837    propofol injection, 10 mcg/kg/min, Intravenous, Titrated, Antoine Richey MD, Stopped at 01/25/20 2006    fentaNYL (SUBLIMAZE) injection 50 mcg, 50 mcg, Intravenous, Q1H PRN, 50 mcg at 01/26/20 0748 **OR** fentaNYL (SUBLIMAZE) injection 25 mcg, 25 mcg, Intravenous, Q1H PRN, Antoine Richey MD, 25 mcg at 01/26/20 1208    LORazepam (ATIVAN) injection 0.5 mg, 0.5 mg, Intravenous, Q6H PRN, Balbir Johnson MD, 0.5 mg at 01/29/20 0117    dexmedetomidine (PRECEDEX) 400 mcg in sodium chloride 0.9 % 100 mL infusion, 0.5 mcg/kg/hr, Intravenous, Continuous, Antoine Richey MD, Last Rate: 9.5 mL/hr at 01/25/20 1958, 0.5 mcg/kg/hr at 01/25/20 1958    insulin lispro (HUMALOG) injection vial 0-12 Units, 0-12 Units, Subcutaneous, TID WC, Antoine Richey MD, 2 Units at 01/29/20 8985    insulin lispro (HUMALOG) injection vial 0-6 Units, 0-6 Units, Subcutaneous, Nightly, Antoine Richey MD, 1 Units at 01/28/20 2039    acetaminophen (TYLENOL) tablet 650 mg, 650 mg, Oral, Q4H PRN, Antoine Richey MD, 650 mg at 01/25/20 0024    senna (SENOKOT) tablet 8.6 mg, 1 tablet, Oral, BID, Balbir Johnson MD, 8.6 mg at 01/29/20 0837    ipratropium-albuterol (DUONEB) nebulizer solution 1 ampule, 1 ampule, Inhalation, Q4H PRN, Antoine Richey MD    [facility]) tablet 5 mg, 5 mg, Oral, TID, Antoine Richey MD, 5 mg at 01/29/20 1423    tamsulosin (FLOMAX) capsule 0.4 mg, 0.4 mg, Oral, Daily, Antoine Richey MD, 0.4 mg at 01/29/20 0836      I/O (24Hr):     Intake/Output Summary (Last 24 hours) at 1/29/2020 1614  Last data filed at 1/29/2020 1415  Gross per 24 hour   Intake 600 ml   Output 1145 ml   Net -545 ml       Data:           Labs:    Hematology:  Recent Labs     01/27/20  0601 01/28/20  0622 01/29/20  0828   WBC 12.6* 10.9 9.9   RBC 3.37* 3.37* 3.33*   HGB 9.8* 9.8* 9.7*   HCT 32.0* 32.0* 31.5*   MCV 95.0 95.0 94.6   MCH 29.1 29.1 29.1   MCHC 30.6 30.6 30.8   RDW 15.0* 14.6* 14.4   * 122* 145   MPV 11.7 11.9 11.1   INR 1.0 1.0

## 2020-01-29 NOTE — PROGRESS NOTES
Stephania SANCHEZ phoned unit at this time, orders received to remove all chest tubes and a nocturnal pulse ox study. Vish is ready for [facility] - plan to d/c tomorrow.

## 2020-01-30 VITALS
BODY MASS INDEX: 26.37 KG/M2 | SYSTOLIC BLOOD PRESSURE: 108 MMHG | HEIGHT: 67 IN | TEMPERATURE: 97.1 F | HEART RATE: 78 BPM | RESPIRATION RATE: 16 BRPM | DIASTOLIC BLOOD PRESSURE: 74 MMHG | OXYGEN SATURATION: 98 % | WEIGHT: 168 LBS

## 2020-01-30 LAB
ANION GAP SERPL CALCULATED.3IONS-SCNC: 9 MMOL/L (ref 9–17)
BUN BLDV-MCNC: 47 MG/DL (ref 8–23)
BUN/CREAT BLD: 24 (ref 9–20)
CALCIUM SERPL-MCNC: 8.8 MG/DL (ref 8.6–10.4)
CHLORIDE BLD-SCNC: 108 MMOL/L (ref 98–107)
CO2: 25 MMOL/L (ref 20–31)
CREAT SERPL-MCNC: 1.95 MG/DL (ref 0.7–1.2)
GFR AFRICAN AMERICAN: 42 ML/MIN
GFR NON-AFRICAN AMERICAN: 34 ML/MIN
GFR SERPL CREATININE-BSD FRML MDRD: ABNORMAL ML/MIN/{1.73_M2}
GFR SERPL CREATININE-BSD FRML MDRD: ABNORMAL ML/MIN/{1.73_M2}
GLUCOSE BLD-MCNC: 103 MG/DL (ref 75–110)
GLUCOSE BLD-MCNC: 117 MG/DL (ref 75–110)
GLUCOSE BLD-MCNC: 139 MG/DL (ref 70–99)
GLUCOSE BLD-MCNC: 232 MG/DL (ref 75–110)
HCT VFR BLD CALC: 32 % (ref 40.7–50.3)
HEMOGLOBIN: 9.9 G/DL (ref 13–17)
INR BLD: 1
MAGNESIUM: 1.9 MG/DL (ref 1.6–2.6)
MCH RBC QN AUTO: 28.8 PG (ref 25.2–33.5)
MCHC RBC AUTO-ENTMCNC: 30.9 G/DL (ref 28.4–34.8)
MCV RBC AUTO: 93 FL (ref 82.6–102.9)
NRBC AUTOMATED: 0 PER 100 WBC
PDW BLD-RTO: 14.3 % (ref 11.8–14.4)
PLATELET # BLD: 155 K/UL (ref 138–453)
PMV BLD AUTO: 10.8 FL (ref 8.1–13.5)
POTASSIUM SERPL-SCNC: 4.4 MMOL/L (ref 3.7–5.3)
PROTHROMBIN TIME: 10 SEC (ref 9.7–11.6)
RBC # BLD: 3.44 M/UL (ref 4.21–5.77)
SODIUM BLD-SCNC: 142 MMOL/L (ref 135–144)
WBC # BLD: 9.6 K/UL (ref 3.5–11.3)

## 2020-01-30 PROCEDURE — 2580000003 HC RX 258: Performed by: THORACIC SURGERY (CARDIOTHORACIC VASCULAR SURGERY)

## 2020-01-30 PROCEDURE — 97116 GAIT TRAINING THERAPY: CPT

## 2020-01-30 PROCEDURE — 6370000000 HC RX 637 (ALT 250 FOR IP): Performed by: THORACIC SURGERY (CARDIOTHORACIC VASCULAR SURGERY)

## 2020-01-30 PROCEDURE — 6370000000 HC RX 637 (ALT 250 FOR IP): Performed by: FAMILY MEDICINE

## 2020-01-30 PROCEDURE — 99024 POSTOP FOLLOW-UP VISIT: CPT | Performed by: PHYSICIAN ASSISTANT

## 2020-01-30 PROCEDURE — 85610 PROTHROMBIN TIME: CPT

## 2020-01-30 PROCEDURE — 82947 ASSAY GLUCOSE BLOOD QUANT: CPT

## 2020-01-30 PROCEDURE — 80048 BASIC METABOLIC PNL TOTAL CA: CPT

## 2020-01-30 PROCEDURE — APPSS15 APP SPLIT SHARED TIME 0-15 MINUTES: Performed by: PHYSICIAN ASSISTANT

## 2020-01-30 PROCEDURE — 97530 THERAPEUTIC ACTIVITIES: CPT

## 2020-01-30 PROCEDURE — 83735 ASSAY OF MAGNESIUM: CPT

## 2020-01-30 PROCEDURE — 6370000000 HC RX 637 (ALT 250 FOR IP): Performed by: INTERNAL MEDICINE

## 2020-01-30 PROCEDURE — 85027 COMPLETE CBC AUTOMATED: CPT

## 2020-01-30 RX ORDER — OXYCODONE HYDROCHLORIDE AND ACETAMINOPHEN 5; 325 MG/1; MG/1
1 TABLET ORAL EVERY 4 HOURS PRN
Qty: 30 TABLET | Refills: 0 | Status: SHIPPED | OUTPATIENT
Start: 2020-01-30 | End: 2020-02-02

## 2020-01-30 RX ORDER — GUAIFENESIN 600 MG/1
600 TABLET, EXTENDED RELEASE ORAL 2 TIMES DAILY
Status: DISCONTINUED | OUTPATIENT
Start: 2020-01-30 | End: 2020-01-30 | Stop reason: HOSPADM

## 2020-01-30 RX ORDER — CLOPIDOGREL BISULFATE 75 MG/1
75 TABLET ORAL DAILY
Qty: 30 TABLET | Refills: 3 | Status: SHIPPED | OUTPATIENT
Start: 2020-01-30

## 2020-01-30 RX ORDER — SODIUM BICARBONATE 650 MG/1
650 TABLET ORAL 2 TIMES DAILY
Status: DISCONTINUED | OUTPATIENT
Start: 2020-01-30 | End: 2020-01-30 | Stop reason: HOSPADM

## 2020-01-30 RX ORDER — ASPIRIN 81 MG/1
81 TABLET ORAL DAILY
Qty: 30 TABLET | Refills: 3 | Status: SHIPPED | OUTPATIENT
Start: 2020-01-30

## 2020-01-30 RX ORDER — PANTOPRAZOLE SODIUM 40 MG/1
40 TABLET, DELAYED RELEASE ORAL
Qty: 30 TABLET | Refills: 3 | Status: SHIPPED | OUTPATIENT
Start: 2020-01-31

## 2020-01-30 RX ORDER — LUBIPROSTONE 8 UG/1
8 CAPSULE, GELATIN COATED ORAL 2 TIMES DAILY WITH MEALS
Qty: 30 CAPSULE | Refills: 3 | Status: SHIPPED | OUTPATIENT
Start: 2020-01-30

## 2020-01-30 RX ADMIN — INSULIN LISPRO 4 UNITS: 100 INJECTION, SOLUTION INTRAVENOUS; SUBCUTANEOUS at 11:51

## 2020-01-30 RX ADMIN — OXYCODONE AND ACETAMINOPHEN 2 TABLET: 5; 325 TABLET ORAL at 10:47

## 2020-01-30 RX ADMIN — TAMSULOSIN HYDROCHLORIDE 0.4 MG: 0.4 CAPSULE ORAL at 10:35

## 2020-01-30 RX ADMIN — Medication 10 ML: at 10:00

## 2020-01-30 RX ADMIN — SENNOSIDES 8.6 MG: 8.6 TABLET, FILM COATED ORAL at 10:36

## 2020-01-30 RX ADMIN — SODIUM BICARBONATE 1300 MG: 650 TABLET ORAL at 10:36

## 2020-01-30 RX ADMIN — GUAIFENESIN 600 MG: 600 TABLET, EXTENDED RELEASE ORAL at 10:47

## 2020-01-30 RX ADMIN — LUBIPROSTONE 8 MCG: 8 CAPSULE, GELATIN COATED ORAL at 10:37

## 2020-01-30 RX ADMIN — DOCUSATE SODIUM 100 MG: 100 CAPSULE, LIQUID FILLED ORAL at 10:39

## 2020-01-30 RX ADMIN — METOPROLOL TARTRATE 12.5 MG: 25 TABLET ORAL at 10:40

## 2020-01-30 RX ADMIN — PANTOPRAZOLE SODIUM 40 MG: 40 TABLET, DELAYED RELEASE ORAL at 06:26

## 2020-01-30 RX ADMIN — PHENOL 1 SPRAY: 1.5 LIQUID ORAL at 10:44

## 2020-01-30 RX ADMIN — CLOPIDOGREL BISULFATE 75 MG: 75 TABLET ORAL at 10:37

## 2020-01-30 RX ADMIN — ASPIRIN 81 MG: 81 TABLET, COATED ORAL at 10:37

## 2020-01-30 RX ADMIN — SODIUM BICARBONATE 650 MG: 650 TABLET ORAL at 16:51

## 2020-01-30 RX ADMIN — OXYBUTYNIN CHLORIDE 5 MG: 5 TABLET ORAL at 10:39

## 2020-01-30 RX ADMIN — OXYBUTYNIN CHLORIDE 5 MG: 5 TABLET ORAL at 14:52

## 2020-01-30 ASSESSMENT — PAIN DESCRIPTION - DESCRIPTORS
DESCRIPTORS: DISCOMFORT
DESCRIPTORS: DISCOMFORT
DESCRIPTORS: ACHING;DISCOMFORT

## 2020-01-30 ASSESSMENT — PAIN DESCRIPTION - PROGRESSION
CLINICAL_PROGRESSION: NOT CHANGED

## 2020-01-30 ASSESSMENT — PAIN DESCRIPTION - PAIN TYPE: TYPE: ACUTE PAIN

## 2020-01-30 ASSESSMENT — PAIN SCALES - GENERAL
PAINLEVEL_OUTOF10: 7
PAINLEVEL_OUTOF10: 2
PAINLEVEL_OUTOF10: 7
PAINLEVEL_OUTOF10: 3

## 2020-01-30 ASSESSMENT — PAIN - FUNCTIONAL ASSESSMENT
PAIN_FUNCTIONAL_ASSESSMENT: ACTIVITIES ARE NOT PREVENTED
PAIN_FUNCTIONAL_ASSESSMENT: ACTIVITIES ARE NOT PREVENTED

## 2020-01-30 ASSESSMENT — PAIN DESCRIPTION - FREQUENCY
FREQUENCY: INTERMITTENT

## 2020-01-30 ASSESSMENT — PAIN DESCRIPTION - LOCATION
LOCATION: CHEST
LOCATION: CHEST;INCISION
LOCATION: CHEST;INCISION

## 2020-01-30 ASSESSMENT — PAIN DESCRIPTION - ONSET
ONSET: SUDDEN
ONSET: ON-GOING

## 2020-01-30 ASSESSMENT — PAIN DESCRIPTION - ORIENTATION
ORIENTATION: MID
ORIENTATION: MID

## 2020-01-30 NOTE — CARE COORDINATION
Social work: Writer advised patient ready for [employer] today. Patient to dc to [facility] of [address] via [employer] at 14:00. # for RN report: [phone]. [address] faxed to [phone]. Informed RN and SNF of dc time, agreeable to plan. HENS completed.

## 2020-01-30 NOTE — PLAN OF CARE
Problem: Falls - Risk of:  Goal: Will remain free from falls  Description  Will remain free from falls  Outcome: Ongoing  Note:   Pt. Alert and oriented x 4, uses call light appropriately, with one assist uses walker to get up from bedside. Does need reinforcement of limits and pt. Safety. Pt. After educated to use heart hugger when getting up need a lot of encouragement and still did not use heart hugger. Pt. Safety maintained at this time. Problem: Pain:  Goal: Control of acute pain  Description  Control of acute pain  Outcome: Ongoing  Note:   Pt. Reported a pain of 7 on a pain scale of 0-10 at midsternum incision site. Pt administered 2 percocets and appeared to be resting comfortably in chair. Problem: Risk for Impaired Skin Integrity  Goal: Tissue integrity - skin and mucous membranes  Description  Structural intactness and normal physiological function of skin and  mucous membranes. Outcome: Ongoing  Note:   Pt. Repositions self and refused hygiene care at this time.

## 2020-01-30 NOTE — PROGRESS NOTES
Physical Therapy  Facility/Department: [facility] CVICU  Daily Treatment Note  NAME: Miguelina Cason  : 1950  MRN: [id number]    Date of Service: 2020    Discharge Recommendations:  Subacute/Skilled Nursing Facility, Continue to assess pending progress        Assessment   Body structures, Functions, Activity limitations: Decreased functional mobility ; Decreased strength;Decreased endurance;Decreased vision/visual deficit; Decreased coordination;Decreased ADL status; Decreased sensation;Decreased ROM; Decreased balance; Increased pain  Assessment: pt progressing toward goals  Activity Tolerance  Activity Tolerance: Patient Tolerated treatment well     Patient Diagnosis(es): The encounter diagnosis was Chest pain, unspecified type. has a past medical history of Back pain, Diabetes mellitus ([facility] [address] and Kidney stone. has a past surgical history that includes back surgery; Ureter stent placement; Cystocopy (2019); Cystoscopy (N/A, 2019); and Coronary artery bypass graft (N/A, 2020). Restrictions  Restrictions/Precautions  Restrictions/Precautions: Fall Risk, Up as Tolerated  Required Braces or Orthoses?: Yes  Required Braces or Orthoses  Other: Heart Hugger Brace  Position Activity Restriction  Sternal Precautions: No Pushing, No Pulling, 5# Lifting Restrictions  Sternal Precautions: post-op CABG x 2 - heart hugger on at all times  Other position/activity restrictions:  Up with assist, chest tubes, heart hugger, martinez  Subjective   General  Chart Reviewed: Yes  Response To Previous Treatment: Not applicable  Subjective  Subjective: pt requires mod encouragement to participate  Pain Screening  Patient Currently in Pain: Denies  Pain Assessment  Pain Assessment: 0-10  Pain Level: 3  Pain Location: Chest  Pain Descriptors: Discomfort  Pain Frequency: Intermittent  Pain Onset: On-going  Vital Signs  Patient Currently in Pain: Denies       Orientation     Cognition      Objective   Bed mobility  Scooting: Contact guard assistance  Transfers  Sit to Stand: Contact guard assistance  Stand to sit: Contact guard assistance  Stand Pivot Transfers: Contact guard assistance  Ambulation  Ambulation?: Yes  Ambulation 1  Surface: level tile  Device: Rolling Walker  Assistance: Contact guard assistance  Quality of Gait: good pattern  Gait Deviations: Decreased step length;Decreased step height  Distance: 250 ft  Comments: pt refuses to amb. further than 250 ft pt get agitated with too much encouragement     Balance  Posture: Good  Sitting - Static: Good  Sitting - Dynamic: Fair;+  Standing - Static: Good  Standing - Dynamic: Good;-  Exercises  Comments: reviewed LE AROM         Comment: assisted pt to bedside chair from bed, assisted pt changing gown and [facility] pants                G-Code     OutComes Score                                                     AM-PAC Score  AM-PAC Inpatient Mobility Raw Score : 17 (01/30/20 1037)  AM-PAC Inpatient T-Scale Score : 42.13 (01/30/20 1037)  Mobility Inpatient CMS 0-100% Score: 50.57 (01/30/20 1037)  Mobility Inpatient CMS G-Code Modifier : CK (01/30/20 1037)          Goals  Short term goals  Time Frame for Short term goals: 12 visits  Short term goal 1: Pt to be Kathleen for bed mobility with following sternal precautions. Short term goal 2: Pt to be Kathleen for transfer with following sternal precautions and safe sequencing. Short term goal 3: Pt to amb 100 feet with LRAD vs no device with SBA as medical stability dictates. Short term goal 4: Pt to amb up/down 4 steps with B rails SBA, if returns directly to home.     Plan    Plan  Times per week: BID - 7 days/wk  Times per day: Twice a day  Current Treatment Recommendations: Strengthening, Transfer Training, Endurance Training, Neuromuscular Re-education, Patient/Caregiver Education & Training, ROM, Pain Management, Balance Training, Gait Training, Home Exercise Program, Functional Mobility Training, Stair training  Safety Devices  Type of devices: Nurse notified, Left in chair, Gait belt, Call light within reach, All fall risk precautions in place  Restraints  Initially in place: No     Therapy Time   Individual Concurrent Group Co-treatment   Time In  850         Time Out  928         Minutes  38                 [address], PTA

## 2020-01-30 NOTE — FLOWSHEET NOTE
01/30/20 1135   Provider Notification   Reason for Communication Review case   Provider Name Dr. Garcia Reach   Provider Notification Physician   Method of Communication Face to face   Response At bedside   Notification Time [id number]     MD rounding at this time. New orders received, see MAR. Will continue to monitor.

## 2020-01-30 NOTE — FLOWSHEET NOTE
01/30/20 1230   Provider Notification   Reason for Communication Review case   Provider Name Dr. Rueda Linear   Provider Notification Physician   Method of Communication Face to face   Response At bedside   Notification Time 200     MD rounding at this time. [facility]. Will continue to monitor.

## 2020-01-30 NOTE — FLOWSHEET NOTE
01/30/20 0800   Provider Notification   Reason for Communication Review case   Provider Name Cynthia Mariano   Provider Notification Physician Assistant   Method of Communication Face to face   Response At bedside   Notification Time 0800   PA rounding at this time. Clip pacing wires. [id number] Lisa Pavon for discharge to [place].

## 2020-01-30 NOTE — PROGRESS NOTES
Discharge instructions given to patient. Patient understands, no further questions at this time. All paperwork faxed to [facility] at this time. Report called and given to RN at [facility]. Zurdo Kapadia here to pickup to transport patient. All paperwork given to . All belongings given back to patient. No further needs at this time.

## 2020-01-30 NOTE — PROGRESS NOTES
DATE: 2020    NAME: Lucas Wong  MRN: [id number]   : 1950    Patient not seen this date for Physical Therapy due to:  [] Blood transfusion in progress  [] Cancel by RN  [] Hemodialysis  [x]  Refusal by Patient (RN aware)  [] Spine Precautions   [] Strict Bedrest  [] Surgery  [] Testing      [] Other        [] PT being discontinued at this time. Patient independent. No further needs. [] PT being discontinued at this time as the patient has been transferred to hospice care. No further needs.     UMAIR CANDELARIO, PTA

## 2020-01-30 NOTE — CARE COORDINATION
Social work: Writer advised pt needs to have BM prior to DC. SW cancelled 14:00 transport with [facility]. RN will reschedule with [facility] when appropriate [phone]. UPDATE: Call back rec'd from Jackie/CVICU, patient had BM and transport arranged for 4:47HG.  Christian informed of above.      # for report: [phone]

## 2020-01-30 NOTE — DISCHARGE SUMMARY
[facility]    Adult Hospitalist      Patient ID: Gricelda Sykes  MRN: [id number]     [place]:  [de-identified]       Patient's PCP: Linda Stovall MD    Admit Date: 1/22/2020     Discharge Date:   1/30/20    Admitting Physician: Claire Loredo MD    Discharge Physician: Sasha Wei MD     CONSULTANTS: Patient Care Team:  Susan Yin MD as PCP - General (Family Medicine)      Active Discharge Diagnoses:  · NSTEMI/Unstable angina status post coronary bypass graft x 2 vessels on 01/25/2020  · Chronic a artery disease/positive cardiac stress test   · Essential hypertension   · CKD stage 3   · Hyperlipidemia, mixed   · Chronic COPD, not in exacerbation   · Chronic pain syndrome  · Anxiety disorder   · BPH/Urge incontinence  · Constipation    Hospital Course:   Gricelda Sykes is a 77 y.o.  male who presents with Chest Pain     Patient presents with complaints of pain over his left to mid chest.  She says pain has been going on for almost 1 month now. Pain is moderate to severe. Last several minutes resolve spontaneously. Denies any radiation to his shoulders or back. Has radiation to arms or abdomen. Denies any nausea or diaphoresis associated with that. Denies palpitations or dyspnea. Denies fever or orthopnea. States he has had dry cough, Patient was admitted to the hospital earlier for nephrolithiasis. Patient had acute kidney injury and has a history of chronic kidney disease. Admitted for NSTEMI/unstable angina status post coronary artery bypass graft on 1/25/2020, after having a positive cardiac stress test, patientSeen by CT surgery, cardiology,, over the course patient did well patient was continued on aspirin Lipitor, Lopressor Norvasc, further patient was not put on ACE inhibitor due to renal insufficiency, patient was continued on Flomax and Ditropan, over the course patient did well, patient is feeling better back to baseline was not having any bowel movement but did have good bowel movement today it was recommended for him to be discharged to skilled nursing care facility for further rehabilitation patient was told to have a very close follow-up with CT surgery and cardiology as an outpatient discharge in stable condition      The plan was discussed in detail with patient who agreed with the plan and verbalized understanding . The patient was seen and examined on day of discharge and this discharge summary is in conjunction with any daily progress note from day of discharge.     Hospital Data:    Labs:    Hematology:  Recent Labs     01/28/20  0622 01/29/20  0828 01/30/20  0455   WBC 10.9 9.9 9.6   RBC 3.37* 3.33* 3.44*   HGB 9.8* 9.7* 9.9*   HCT 32.0* 31.5* 32.0*   MCV 95.0 94.6 93.0   MCH 29.1 29.1 28.8   MCHC 30.6 30.8 30.9   RDW 14.6* 14.4 14.3   * 145 155   MPV 11.9 11.1 10.8   INR 1.0 1.0 1.0     Chemistry:  Recent Labs     01/28/20  0622 01/29/20  0828 01/30/20  0455    139 142   K 4.5 4.2 4.4   * 108* 108*   CO2 22 23 25   GLUCOSE 122* 197* 139*   BUN 49* 49* 47*   CREATININE 2.69* 2.47* 1.95*   MG 2.2 2.0 1.9   ANIONGAP 11 8* 9   LABGLOM 24* 26* 34*   GFRAA 29* 32* 42*   CALCIUM 8.6 8.5* 8.8   PHOS 3.8  --   --      Recent Labs     01/28/20  1646 01/29/20  1125 01/29/20  1634 01/29/20  1936 01/30/20  0702 01/30/20  1058   POCGLU 143* 142* 141* 164* 117* 232*     Lab Results   Component Value Date    INR 1.0 01/30/2020    INR 1.0 01/29/2020    INR 1.0 01/28/2020    PROTIME 10.0 01/30/2020    PROTIME 10.1 01/29/2020    PROTIME 9.8 01/28/2020     Lab Results   Component Value Date/Time    SPECIAL NOT REPORTED 12/27/2013 10:38 AM     Lab Results   Component Value Date/Time    CULTURE NO SIGNIFICANT GROWTH 12/27/2013 10:38 AM    CULTURE  12/27/2013 10:38 AM     Performed at [address] [place] [address] [phone]       Lab Results   Component Value Date    POCPH 7.31 01/25/2020    POCPCO2 38 01/25/2020    POCPO2 92 01/25/2020 mg/dL and patient ALERT and TOLERATING PO     HYPOGLYCEMIA TREATMENT: blood glucose less than 70 mg/dL and patient NOT ALERT or NPO     POCT Glucose     Initiate Adult NIVProtocol     Respiratory care evaluation only     EZPAP     Acapella     Pulse oximetry, overnight           Diet: Dietary Nutrition Supplements: Diabetic Oral Supplement, Renal Oral Supplement  DIET DENTAL SOFT; No Caffeine, Low Potassium, Low Phosphorus    Discharge Medications:    Cirilo Garner   Home Medication Instructions [id number]    Printed on:01/30/20 3344   Medication Information                      albuterol-ipratropium (COMBIVENT RESPIMAT)  MCG/ACT AERS inhaler  Inhale 1 puff into the lungs every 6 hours             aspirin 81 MG EC tablet  Take 1 tablet by mouth daily             atorvastatin (LIPITOR) 20 MG tablet  Take 1 tablet by mouth nightly             clopidogrel (PLAVIX) 75 MG tablet  Take 1 tablet by mouth daily             lubiprostone (AMITIZA) 8 MCG CAPS capsule  Take 1 capsule by mouth 2 times daily (with meals)             metoprolol tartrate (LOPRESSOR) 25 MG tablet  Take 0.5 tablets by mouth 2 times daily             morphine (MS CONTIN) 15 MG extended release tablet  Take 15 mg by mouth 2 times daily as needed for Pain. oxybutynin (DITROPAN) 5 MG tablet  Take 1 tablet by mouth 3 times daily             oxyCODONE-acetaminophen (PERCOCET) 5-325 MG per tablet  Take 1 tablet by mouth every 4 hours as needed for Pain for up to 3 days.              pantoprazole (PROTONIX) 40 MG tablet  Take 1 tablet by mouth every morning (before breakfast)             psyllium (KONSYL) 28.3 % PACK  Take 1 packet by mouth daily             sodium bicarbonate 650 MG tablet  Take 1 tablet by mouth 2 times daily             tamsulosin (FLOMAX) 0.4 MG capsule  Take 1 capsule by mouth daily                 Code Status:  Full Code    Time Spent on discharge is  35 mins in patient examination, evaluation, counseling as

## 2020-01-30 NOTE — DISCHARGE SUMMARY
Physician Discharge Summary     Patient ID:  Carlos Mabry  [id number]  73 y.o.  1950    Admit date: 1/22/2020    Discharge date and time: 01/30/20     Admitting Physician: Eve Gorman MD     Discharge Physician: Rony Leary    Admission Diagnoses: Chest pain [R07.9]  NSTEMI (non-ST elevated myocardial infarction) [facility]) [I21.4]    Discharge Diagnoses: Same. S/P CABG    Admission Condition: good    Discharged Condition: good    Indication for Admission: CAD    Hospital Course: CABG post op course unremarkable    Consults: cardiology, nephrology and urology    Discharge Exam:  /88   Pulse 87   Temp 97.5 °F (36.4 °C) (Temporal)   Resp 18   Ht 5' 7\" (1.702 m)   Wt 168 lb (76.2 kg)   SpO2 98%   BMI 26.31 kg/m²   Lungs clear  HD NSR   GI + BM  Incisions intact    Disposition: SNF    Patient Instructions:   psyllium (KONSYL) 28.3 % PACK Take 1 packet by mouth daily   aspirin 81 MG chewable tablet Take 1 tablet by mouth daily     albuterol-ipratropium (COMBIVENT RESPIMAT)  MCG/ACT AERS inhaler Inhale 1 puff into the lungs every 6 hours     morphine (MS CONTIN) 15 MG extended release tablet Take 15 mg by mouth 2 times daily as needed for Pain.      sodium bicarbonate 650 MG tablet Take 1 tablet by mouth 2 times daily     oxybutynin (DITROPAN) 5 MG tablet Take 1 tablet by mouth 3 times daily     atorvastatin (LIPITOR) 20 MG tablet Take 1 tablet by mouth nightly     tamsulosin (FLOMAX) 0.4 MG capsule Take 1 capsule by mouth daily     metoprolol tartrate (LOPRESSOR) 50 MG tablet Take 1 tablet by mouth 2 times daily     amLODIPine (NORVASC) 10 MG tablet Take 1 tablet by mouth daily       Activity: activity as tolerated  Diet: cardiac diet  Wound Care: keep wound clean and dry    Follow-up with Denisse Diego  in 2 weeks. Signed:   Alondra Nicole    1/30/2020  7:52 AM

## 2020-01-30 NOTE — PROGRESS NOTES
Nephrology progress note    Reason for Consult:  Elevated creatinine    Requesting Physician:  Alondra Gomez MD    Interval history:  Creatinine continued to improved towards baseline. Complaining of constipation  Jeffries catheter was discontinued yesterday    HISTORY OF PRESENT ILLNESS:    The patient is a 77 y.o. male who presents with chest pain, unstable angina, NSTEMI. He underwent cardiac cath earlier today, which showed severe multiple vessel CAD. Received 60mL of contrast.  Underlying CKD 3 with baseline creatinine around 2. Creatinine 2.25 yesterday and 2.33 this am. Electrolytes ok. Admitted last month for obstructing right ureteric calculus and hydronephrosis. Pt underwent cysto, pyelogram and removal of nephrolith at that time. SBP trending 120-150s./. Hx of HTN. Home meds include norvasc, metoprolol. However, he states he had stopped taking multiple medications in recently due to c/o lightheadedness, dizziness, and feeling that they were making him more confused. He states he does not know which medications he has been taking recently. He denies CP, SOB, fever currently. C/o always feeling cold, but denies chills. Denies edema. Prior to Admission medications    Medication Sig Start Date End Date Taking? Authorizing Provider   oxyCODONE-acetaminophen (PERCOCET) 5-325 MG per tablet Take 1 tablet by mouth every 4 hours as needed for Pain for up to 3 days.  1/30/20 2/2/20 Yes LAURA Langston   aspirin 81 MG EC tablet Take 1 tablet by mouth daily 1/30/20  Yes LAURA Langston   metoprolol tartrate (LOPRESSOR) 25 MG tablet Take 0.5 tablets by mouth 2 times daily 1/30/20  Yes LAURA Langston   lubiprostone [facility]) 8 MCG CAPS capsule Take 1 capsule by mouth 2 times daily (with meals) 1/30/20  Yes LAURA Langston   clopidogrel (PLAVIX) 75 MG tablet Take 1 tablet by mouth daily 1/30/20  Yes LAURA Langston   pantoprazole (PROTONIX) 40 MG tablet Take 1 tablet by mouth every morning (before breakfast) 1/31/20  Yes LAURA Casas   psyllium (KONSYL) 28.3 % PACK Take 1 packet by mouth daily   Yes Historical Provider, MD   albuterol-ipratropium (COMBIVENT RESPIMAT)  MCG/ACT AERS inhaler Inhale 1 puff into the lungs every 6 hours 12/13/19  Yes Artem June MD   morphine (MS CONTIN) 15 MG extended release tablet Take 15 mg by mouth 2 times daily as needed for Pain. 11/7/19  Yes Miguelina RICHMOND Ice   sodium bicarbonate 650 MG tablet Take 1 tablet by mouth 2 times daily 12/15/19   Artem June MD   oxybutynin (DITROPAN) 5 MG tablet Take 1 tablet by mouth 3 times daily 12/15/19   Artem June MD   atorvastatin (LIPITOR) 20 MG tablet Take 1 tablet by mouth nightly 12/13/19   Artem June MD   tamsulosin (FLOMAX) 0.4 MG capsule Take 1 capsule by mouth daily 12/14/19   Artem June MD       Scheduled Meds:   guaiFENesin  600 mg Oral BID    pantoprazole  40 mg Oral QAM AC    metoprolol tartrate  12.5 mg Oral BID    atorvastatin  40 mg Oral Nightly    lubiprostone  8 mcg Oral BID WC    sodium bicarbonate  1,300 mg Oral BID    sodium chloride flush  10 mL Intravenous 2 times per day    aspirin  81 mg Oral Daily    clopidogrel  75 mg Oral Daily    docusate sodium  100 mg Oral BID    insulin lispro  0-12 Units Subcutaneous TID WC    insulin lispro  0-6 Units Subcutaneous Nightly    senna  1 tablet Oral BID    oxybutynin  5 mg Oral TID    tamsulosin  0.4 mg Oral Daily     Physical Exam:  Vitals:    01/29/20 2000 01/30/20 0000 01/30/20 0800 01/30/20 1035   BP:    108/74   Pulse:    89   Resp:  18 16    Temp: 99.4 °F (37.4 °C) 97.5 °F (36.4 °C) 97.5 °F (36.4 °C)    TempSrc: Temporal Temporal Temporal    SpO2:  98%     Weight:       Height:         I/O last 3 completed shifts: In: 56 [P.O.:480; I.V.:10]  Out: 375 [Urine:215; Drains:160]      General:  Awake, alert, not in distress. Appears to be stated age. HEENT: Atraumatic, normocephalic. Anicteric sclera.  Pink and moist oral mucosa. Neck supple. No JVD. Chest: Bilateral air entry, clear to auscultation, no wheezing, rhonchi or rales. Cardiovascular: RRR, S1S2, no murmur, rub or gallop. No lower extremity edema. Abdomen: Soft, non tender to palpation. Musculoskeletal:    No cyanosis or clubbing. Integumentary: Pink, warm and dry. Free from rash or lesions. Skin turgor normal.  CNS: Oriented to person, place and time. Speech clear. Face symmetrical. No tremor.      Data:    CBC:   Lab Results   Component Value Date    WBC 9.6 01/30/2020    HGB 9.9 (L) 01/30/2020    HCT 32.0 (L) 01/30/2020    MCV 93.0 01/30/2020     01/30/2020     BMP:    Lab Results   Component Value Date     01/30/2020     01/29/2020     01/28/2020    K 4.4 01/30/2020    K 4.2 01/29/2020    K 4.5 01/28/2020     (H) 01/30/2020     (H) 01/29/2020     (H) 01/28/2020    CO2 25 01/30/2020    CO2 23 01/29/2020    CO2 22 01/28/2020    BUN 47 (H) 01/30/2020    BUN 49 (H) 01/29/2020    BUN 49 (H) 01/28/2020    CREATININE 1.95 (H) 01/30/2020    CREATININE 2.47 (H) 01/29/2020    CREATININE 2.69 (H) 01/28/2020    GLUCOSE 139 (H) 01/30/2020    GLUCOSE 197 (H) 01/29/2020    GLUCOSE 122 (H) 01/28/2020     CMP:   Lab Results   Component Value Date     01/30/2020    K 4.4 01/30/2020     01/30/2020    CO2 25 01/30/2020    BUN 47 01/30/2020    CREATININE 1.95 01/30/2020    GLUCOSE 139 01/30/2020    CALCIUM 8.8 01/30/2020    PROT 6.3 01/23/2020    LABALBU 3.3 01/23/2020    BILITOT 0.31 01/23/2020    ALKPHOS 49 01/23/2020    AST 13 01/23/2020    ALT 7 01/23/2020      Hepatic:   Lab Results   Component Value Date    AST 13 01/23/2020    AST 16 12/06/2019    ALT 7 01/23/2020    ALT 9 12/06/2019    BILITOT 0.31 01/23/2020    BILITOT 0.31 12/06/2019    ALKPHOS 49 01/23/2020    ALKPHOS 47 12/06/2019     BNP: No results found for: BNP  Lipids:   Lab Results   Component Value Date    CHOL 177 01/23/2020    HDL 51 01/23/2020     INR:   Lab Results   Component Value Date    INR 1.0 01/30/2020    INR 1.0 01/29/2020    INR 1.0 01/28/2020     PTH: No results found for: PTH  Phosphorus:    Lab Results   Component Value Date    PHOS 3.8 01/28/2020     Ionized Calcium: No results found for: IONCA  Magnesium:   Lab Results   Component Value Date    MG 1.9 01/30/2020     Albumin:   Lab Results   Component Value Date    LABALBU 3.3 01/23/2020     Last 3 CK, CKMB, Troponin: @LABRCNT(CKTOTAL:3,CKMB:3,TROPONINI:3)       URINE:)No results found for: Jewels Ybarra    Radiology:   Reviewed. Assessment:  1. CKD stage 3B-creatinine is slightly better today. 2. Hypertension. 3. Proteinuria (MACR 892mg/g 12/2019). 4. NSTEMI s/p cardiac cath 1/23. 5. Recent nephrolithiasis (12/2019)-underwent cysto, pyelogram and removal of nephrolith at that time. 6.  Borderline hyperkalemia. 7. S/p CABG. 8. Metabolic acidosis. 9. Pulmonary congestion. Plan:  Decrease sodium bicarb to 650 mg twice daily   Tap water enema   Lasix as needed  Rest of electrolytes ok. Continue Metoprolol. Holding Norvasc for now. Monitor BP. Low K diet. Avoid nephrotoxic drugs, NSAIDs, fleet's enemas, and IV contrast exposure.   Clear for discharge from nephrology perspective after constipation is resolved    Electronically signed by Erica Fung MD  on 1/30/2020 at 11:39 AM

## 2020-02-04 ENCOUNTER — OFFICE VISIT (OUTPATIENT)
Dept: CARDIOTHORACIC SURGERY | Age: 70
End: 2020-02-04
Payer: MEDICARE

## 2020-02-04 ENCOUNTER — TELEPHONE (OUTPATIENT)
Dept: CARDIOTHORACIC SURGERY | Age: 70
End: 2020-02-04

## 2020-02-04 VITALS
TEMPERATURE: 98.1 F | HEART RATE: 93 BPM | HEIGHT: 67 IN | WEIGHT: 166 LBS | DIASTOLIC BLOOD PRESSURE: 88 MMHG | BODY MASS INDEX: 26.06 KG/M2 | RESPIRATION RATE: 16 BRPM | SYSTOLIC BLOOD PRESSURE: 137 MMHG | OXYGEN SATURATION: 99 %

## 2020-02-04 PROCEDURE — 99213 OFFICE O/P EST LOW 20 MIN: CPT | Performed by: NURSE PRACTITIONER

## 2020-02-04 RX ORDER — OXYCODONE HYDROCHLORIDE AND ACETAMINOPHEN 5; 325 MG/1; MG/1
2 TABLET ORAL EVERY 4 HOURS PRN
COMMUNITY

## 2020-02-04 ASSESSMENT — ENCOUNTER SYMPTOMS
WHEEZING: 0
CONSTIPATION: 1
SHORTNESS OF BREATH: 0

## 2020-02-04 NOTE — PATIENT INSTRUCTIONS
better. You need to take the full course of antibiotics. Incision care    · If you have strips of tape on the incisions the doctor made, leave the tape on for a week or until it falls off.     · Wash the area daily with warm, soapy water, and pat it dry. Don't use hydrogen peroxide or alcohol, which can slow healing. You may cover the area with a gauze bandage if it weeps or rubs against clothing. Change the bandage every day.     · Keep the area clean and dry.     · Do not use any creams, lotions, powders, ointments, or oils unless your doctor tells you it is okay.     · If you have an incision in your leg:  ? Wear support stockings on your legs during the day for the first 2 weeks. You can take the stockings off at night while you sleep. ? Raise your legs above the level of your heart whenever you lay down for the first 4 to 6 weeks. Other instructions    · Keep track of your weight. Weigh yourself every day at the same time of day, on the same scale, in the same amount of clothing. A sudden increase in weight can be a sign of a problem with your heart. Tell your doctor if you suddenly gain weight, such as 3 pounds or more in 2 to 3 days.     · Do not smoke. Smoking can make it harder for you to recover and it will raise the chances of your arteries narrowing again. If you need help quitting, talk to your doctor about stop-smoking programs and medicines. These can increase your chances of quitting for good. Follow-up care is a key part of your treatment and safety. Be sure to make and go to all appointments, and call your doctor if you are having problems. It's also a good idea to know your test results and keep a list of the medicines you take. When should you call for help? Call 911 anytime you think you may need emergency care.  For example, call if:    · You passed out (lost consciousness).     · You have severe trouble breathing.     · You have sudden chest pain and shortness of breath, or you cough up blood.     · You have severe pain in your chest.     · You have symptoms of a heart attack. These may include:  ? Chest pain or pressure, or a strange feeling in the chest.  ? Sweating. ? Shortness of breath. ? Nausea or vomiting. ? Pain, pressure, or a strange feeling in the back, neck, jaw, or upper belly or in one or both shoulders or arms. ? Lightheadedness or sudden weakness. ? A fast or irregular heartbeat. After you call  911, the  may tell you to chew 1 adult-strength or 2 to 4 low-dose aspirin. Wait for an ambulance. Do not try to drive yourself.     · You have angina symptoms (such as chest pain or pressure) that do not go away with rest or are not getting better within 5 minutes after you take a dose of nitroglycerin.    Call your doctor now or seek immediate medical care if:    · You have pain that does not get better after you take pain medicine.     · You have a fever over 100°F.     · You have loose stitches, or your incision comes open.     · Bright red blood has soaked through the bandage over your incision.     · You have signs of infection, such as:  ? Increased pain, swelling, warmth, or redness. ? Red streaks leading from the incision. ? Pus draining from the incision. ? Swollen lymph nodes in your neck, armpits, or groin. ? A fever.     · You have signs of a blood clot in a leg. If you had a vein removed from your leg, you may have tenderness and swelling while your leg heals. But signs of a blood clot may be in a different part of your leg and may include:  ? Pain in your calf, back of the knee, thigh, or groin. ?  Redness and swelling in your leg or groin.     · Your heartbeat feels very fast or slow, skips beats, or flutters.     · You are dizzy or lightheaded, or you feel like you may faint.     · You have new or increased shortness of breath.    Watch closely for changes in your health, and be sure to contact your doctor if:    · You gain weight suddenly, such as 3

## 2020-02-04 NOTE — PROGRESS NOTES
Effort: Pulmonary effort is normal.      Breath sounds: Normal breath sounds. Abdominal:      General: Bowel sounds are normal.      Palpations: Abdomen is soft. Musculoskeletal:      Right lower leg: Edema present. Left lower leg: Edema present. Skin:     General: Skin is warm and dry. Comments: Incision well approximated, sutures have been removed pta   Neurological:      General: No focal deficit present. Mental Status: He is alert and oriented to person, place, and time.    Psychiatric:         Mood and Affect: Mood normal.          Current Medications:    Current Outpatient Medications:     oxyCODONE-acetaminophen (PERCOCET) 5-325 MG per tablet, Take 2 tablets by mouth every 4 hours as needed for Pain., Disp: , Rfl:     aspirin 81 MG EC tablet, Take 1 tablet by mouth daily, Disp: 30 tablet, Rfl: 3    metoprolol tartrate (LOPRESSOR) 25 MG tablet, Take 0.5 tablets by mouth 2 times daily, Disp: 60 tablet, Rfl: 3    lubiprostone (AMITIZA) 8 MCG CAPS capsule, Take 1 capsule by mouth 2 times daily (with meals), Disp: 30 capsule, Rfl: 3    clopidogrel (PLAVIX) 75 MG tablet, Take 1 tablet by mouth daily, Disp: 30 tablet, Rfl: 3    pantoprazole (PROTONIX) 40 MG tablet, Take 1 tablet by mouth every morning (before breakfast), Disp: 30 tablet, Rfl: 3    psyllium (KONSYL) 28.3 % PACK, Take 1 packet by mouth daily, Disp: , Rfl:     sodium bicarbonate 650 MG tablet, Take 1 tablet by mouth 2 times daily, Disp: 60 tablet, Rfl: 1    oxybutynin (DITROPAN) 5 MG tablet, Take 1 tablet by mouth 3 times daily, Disp: 90 tablet, Rfl: 0    atorvastatin (LIPITOR) 20 MG tablet, Take 1 tablet by mouth nightly, Disp: 30 tablet, Rfl: 0    tamsulosin (FLOMAX) 0.4 MG capsule, Take 1 capsule by mouth daily, Disp: 30 capsule, Rfl: 0    albuterol-ipratropium (COMBIVENT RESPIMAT)  MCG/ACT AERS inhaler, Inhale 1 puff into the lungs every 6 hours, Disp: 1 Inhaler, Rfl: 0    morphine (MS CONTIN) 15 MG extended

## 2020-02-17 ENCOUNTER — APPOINTMENT (OUTPATIENT)
Dept: CT IMAGING | Age: 70
End: 2020-02-17
Payer: MEDICARE

## 2020-02-17 ENCOUNTER — HOSPITAL ENCOUNTER (EMERGENCY)
Age: 70
Discharge: HOME | End: 2020-02-17
Attending: EMERGENCY MEDICINE
Payer: MEDICARE

## 2020-02-17 VITALS
DIASTOLIC BLOOD PRESSURE: 95 MMHG | RESPIRATION RATE: 18 BRPM | SYSTOLIC BLOOD PRESSURE: 157 MMHG | WEIGHT: 170 LBS | OXYGEN SATURATION: 95 % | TEMPERATURE: 96.8 F | BODY MASS INDEX: 26.68 KG/M2 | HEIGHT: 67 IN | HEART RATE: 109 BPM

## 2020-02-17 VITALS
TEMPERATURE: 99 F | HEIGHT: 67 IN | RESPIRATION RATE: 20 BRPM | BODY MASS INDEX: 26.68 KG/M2 | DIASTOLIC BLOOD PRESSURE: 95 MMHG | HEART RATE: 96 BPM | WEIGHT: 170 LBS | SYSTOLIC BLOOD PRESSURE: 135 MMHG | OXYGEN SATURATION: 97 %

## 2020-02-17 LAB
ABSOLUTE EOS #: 0.03 K/UL (ref 0–0.44)
ABSOLUTE EOS #: <0.03 K/UL (ref 0–0.44)
ABSOLUTE IMMATURE GRANULOCYTE: 0.03 K/UL (ref 0–0.3)
ABSOLUTE IMMATURE GRANULOCYTE: 0.06 K/UL (ref 0–0.3)
ABSOLUTE LYMPH #: 1.1 K/UL (ref 1.1–3.7)
ABSOLUTE LYMPH #: 1.37 K/UL (ref 1.1–3.7)
ABSOLUTE MONO #: 0.39 K/UL (ref 0.1–1.2)
ABSOLUTE MONO #: 0.65 K/UL (ref 0.1–1.2)
ALBUMIN SERPL-MCNC: 4.2 G/DL (ref 3.5–5.2)
ALBUMIN/GLOBULIN RATIO: NORMAL (ref 1–2.5)
ALP BLD-CCNC: 69 U/L (ref 40–129)
ALT SERPL-CCNC: 7 U/L (ref 5–41)
AMYLASE: 80 U/L (ref 28–100)
ANION GAP SERPL CALCULATED.3IONS-SCNC: 15 MMOL/L (ref 9–17)
ANION GAP SERPL CALCULATED.3IONS-SCNC: 15 MMOL/L (ref 9–17)
AST SERPL-CCNC: 18 U/L
BASOPHILS # BLD: 0 % (ref 0–2)
BASOPHILS # BLD: 1 % (ref 0–2)
BASOPHILS ABSOLUTE: 0.04 K/UL (ref 0–0.2)
BASOPHILS ABSOLUTE: 0.07 K/UL (ref 0–0.2)
BILIRUB SERPL-MCNC: 0.4 MG/DL (ref 0.3–1.2)
BILIRUBIN DIRECT: 0.12 MG/DL
BILIRUBIN, INDIRECT: 0.28 MG/DL (ref 0–1)
BUN BLDV-MCNC: 46 MG/DL (ref 8–23)
BUN BLDV-MCNC: 49 MG/DL (ref 8–23)
BUN/CREAT BLD: 16 (ref 9–20)
BUN/CREAT BLD: 19 (ref 9–20)
CALCIUM SERPL-MCNC: 9.8 MG/DL (ref 8.6–10.4)
CALCIUM SERPL-MCNC: 9.8 MG/DL (ref 8.6–10.4)
CHLORIDE BLD-SCNC: 104 MMOL/L (ref 98–107)
CHLORIDE BLD-SCNC: 105 MMOL/L (ref 98–107)
CO2: 18 MMOL/L (ref 20–31)
CO2: 19 MMOL/L (ref 20–31)
CREAT SERPL-MCNC: 2.48 MG/DL (ref 0.7–1.2)
CREAT SERPL-MCNC: 2.97 MG/DL (ref 0.7–1.2)
DIFFERENTIAL TYPE: ABNORMAL
DIFFERENTIAL TYPE: ABNORMAL
EOSINOPHILS RELATIVE PERCENT: 0 % (ref 1–4)
EOSINOPHILS RELATIVE PERCENT: 0 % (ref 1–4)
GFR AFRICAN AMERICAN: 26 ML/MIN
GFR AFRICAN AMERICAN: 32 ML/MIN
GFR NON-AFRICAN AMERICAN: 21 ML/MIN
GFR NON-AFRICAN AMERICAN: 26 ML/MIN
GFR SERPL CREATININE-BSD FRML MDRD: ABNORMAL ML/MIN/{1.73_M2}
GLOBULIN: NORMAL G/DL (ref 1.5–3.8)
GLUCOSE BLD-MCNC: 170 MG/DL (ref 70–99)
GLUCOSE BLD-MCNC: 175 MG/DL (ref 70–99)
HCT VFR BLD CALC: 33.4 % (ref 40.7–50.3)
HCT VFR BLD CALC: 37.8 % (ref 40.7–50.3)
HEMOGLOBIN: 10.2 G/DL (ref 13–17)
HEMOGLOBIN: 11.5 G/DL (ref 13–17)
IMMATURE GRANULOCYTES: 0 %
IMMATURE GRANULOCYTES: 1 %
LACTIC ACID: 1.6 MMOL/L (ref 0.5–2.2)
LYMPHOCYTES # BLD: 10 % (ref 24–43)
LYMPHOCYTES # BLD: 12 % (ref 24–43)
MAGNESIUM: 2 MG/DL (ref 1.6–2.6)
MCH RBC QN AUTO: 28.1 PG (ref 25.2–33.5)
MCH RBC QN AUTO: 28.3 PG (ref 25.2–33.5)
MCHC RBC AUTO-ENTMCNC: 30.4 G/DL (ref 28.4–34.8)
MCHC RBC AUTO-ENTMCNC: 30.5 G/DL (ref 28.4–34.8)
MCV RBC AUTO: 92.4 FL (ref 82.6–102.9)
MCV RBC AUTO: 92.8 FL (ref 82.6–102.9)
MONOCYTES # BLD: 4 % (ref 3–12)
MONOCYTES # BLD: 6 % (ref 3–12)
NRBC AUTOMATED: 0 PER 100 WBC
NRBC AUTOMATED: 0 PER 100 WBC
PDW BLD-RTO: 13.4 % (ref 11.8–14.4)
PDW BLD-RTO: 13.6 % (ref 11.8–14.4)
PLATELET # BLD: 286 K/UL (ref 138–453)
PLATELET # BLD: 316 K/UL (ref 138–453)
PLATELET ESTIMATE: ABNORMAL
PLATELET ESTIMATE: ABNORMAL
PMV BLD AUTO: 10 FL (ref 8.1–13.5)
PMV BLD AUTO: 9.8 FL (ref 8.1–13.5)
POTASSIUM SERPL-SCNC: 5.1 MMOL/L (ref 3.7–5.3)
POTASSIUM SERPL-SCNC: 5.7 MMOL/L (ref 3.7–5.3)
RBC # BLD: 3.6 M/UL (ref 4.21–5.77)
RBC # BLD: 4.09 M/UL (ref 4.21–5.77)
RBC # BLD: ABNORMAL 10*6/UL
RBC # BLD: ABNORMAL 10*6/UL
SEG NEUTROPHILS: 81 % (ref 36–65)
SEG NEUTROPHILS: 85 % (ref 36–65)
SEGMENTED NEUTROPHILS ABSOLUTE COUNT: 8.89 K/UL (ref 1.5–8.1)
SEGMENTED NEUTROPHILS ABSOLUTE COUNT: 9.56 K/UL (ref 1.5–8.1)
SODIUM BLD-SCNC: 138 MMOL/L (ref 135–144)
SODIUM BLD-SCNC: 138 MMOL/L (ref 135–144)
TOTAL PROTEIN: 8.2 G/DL (ref 6.4–8.3)
WBC # BLD: 11 K/UL (ref 3.5–11.3)
WBC # BLD: 11.2 K/UL (ref 3.5–11.3)
WBC # BLD: ABNORMAL 10*3/UL
WBC # BLD: ABNORMAL 10*3/UL

## 2020-02-17 PROCEDURE — 99284 EMERGENCY DEPT VISIT MOD MDM: CPT

## 2020-02-17 PROCEDURE — 6360000002 HC RX W HCPCS: Performed by: NURSE PRACTITIONER

## 2020-02-17 PROCEDURE — 74176 CT ABD & PELVIS W/O CONTRAST: CPT

## 2020-02-17 PROCEDURE — 96374 THER/PROPH/DIAG INJ IV PUSH: CPT

## 2020-02-17 PROCEDURE — 36415 COLL VENOUS BLD VENIPUNCTURE: CPT

## 2020-02-17 PROCEDURE — 2580000003 HC RX 258: Performed by: EMERGENCY MEDICINE

## 2020-02-17 PROCEDURE — 85025 COMPLETE CBC W/AUTO DIFF WBC: CPT

## 2020-02-17 PROCEDURE — 96376 TX/PRO/DX INJ SAME DRUG ADON: CPT

## 2020-02-17 PROCEDURE — 99283 EMERGENCY DEPT VISIT LOW MDM: CPT

## 2020-02-17 PROCEDURE — 2580000003 HC RX 258: Performed by: NURSE PRACTITIONER

## 2020-02-17 PROCEDURE — 83605 ASSAY OF LACTIC ACID: CPT

## 2020-02-17 PROCEDURE — 6370000000 HC RX 637 (ALT 250 FOR IP): Performed by: EMERGENCY MEDICINE

## 2020-02-17 PROCEDURE — 6360000002 HC RX W HCPCS: Performed by: EMERGENCY MEDICINE

## 2020-02-17 PROCEDURE — 96375 TX/PRO/DX INJ NEW DRUG ADDON: CPT

## 2020-02-17 PROCEDURE — 83735 ASSAY OF MAGNESIUM: CPT

## 2020-02-17 PROCEDURE — 80048 BASIC METABOLIC PNL TOTAL CA: CPT

## 2020-02-17 PROCEDURE — 80076 HEPATIC FUNCTION PANEL: CPT

## 2020-02-17 PROCEDURE — 82150 ASSAY OF AMYLASE: CPT

## 2020-02-17 RX ORDER — HYDRALAZINE HYDROCHLORIDE 20 MG/ML
10 INJECTION INTRAMUSCULAR; INTRAVENOUS ONCE
Status: COMPLETED | OUTPATIENT
Start: 2020-02-17 | End: 2020-02-17

## 2020-02-17 RX ORDER — KETOROLAC TROMETHAMINE 15 MG/ML
15 INJECTION, SOLUTION INTRAMUSCULAR; INTRAVENOUS ONCE
Status: COMPLETED | OUTPATIENT
Start: 2020-02-17 | End: 2020-02-17

## 2020-02-17 RX ORDER — HYDROMORPHONE HYDROCHLORIDE 1 MG/ML
1 INJECTION, SOLUTION INTRAMUSCULAR; INTRAVENOUS; SUBCUTANEOUS ONCE
Status: COMPLETED | OUTPATIENT
Start: 2020-02-17 | End: 2020-02-17

## 2020-02-17 RX ORDER — MORPHINE SULFATE 2 MG/ML
2 INJECTION, SOLUTION INTRAMUSCULAR; INTRAVENOUS ONCE
Status: COMPLETED | OUTPATIENT
Start: 2020-02-17 | End: 2020-02-17

## 2020-02-17 RX ORDER — TAMSULOSIN HYDROCHLORIDE 0.4 MG/1
0.4 CAPSULE ORAL ONCE
Status: COMPLETED | OUTPATIENT
Start: 2020-02-17 | End: 2020-02-17

## 2020-02-17 RX ORDER — ONDANSETRON 2 MG/ML
8 INJECTION INTRAMUSCULAR; INTRAVENOUS ONCE
Status: COMPLETED | OUTPATIENT
Start: 2020-02-17 | End: 2020-02-17

## 2020-02-17 RX ORDER — OXYCODONE HYDROCHLORIDE AND ACETAMINOPHEN 5; 325 MG/1; MG/1
1 TABLET ORAL EVERY 6 HOURS PRN
Qty: 20 TABLET | Refills: 0 | Status: SHIPPED | OUTPATIENT
Start: 2020-02-17 | End: 2020-02-22

## 2020-02-17 RX ORDER — 0.9 % SODIUM CHLORIDE 0.9 %
500 INTRAVENOUS SOLUTION INTRAVENOUS ONCE
Status: COMPLETED | OUTPATIENT
Start: 2020-02-17 | End: 2020-02-17

## 2020-02-17 RX ORDER — 0.9 % SODIUM CHLORIDE 0.9 %
1000 INTRAVENOUS SOLUTION INTRAVENOUS ONCE
Status: COMPLETED | OUTPATIENT
Start: 2020-02-17 | End: 2020-02-17

## 2020-02-17 RX ORDER — TAMSULOSIN HYDROCHLORIDE 0.4 MG/1
0.4 CAPSULE ORAL DAILY
Qty: 5 CAPSULE | Refills: 0 | Status: SHIPPED | OUTPATIENT
Start: 2020-02-17 | End: 2020-02-22

## 2020-02-17 RX ADMIN — HYDROMORPHONE HYDROCHLORIDE 1 MG: 1 INJECTION, SOLUTION INTRAMUSCULAR; INTRAVENOUS; SUBCUTANEOUS at 01:38

## 2020-02-17 RX ADMIN — HYDRALAZINE HYDROCHLORIDE 10 MG: 20 INJECTION INTRAMUSCULAR; INTRAVENOUS at 03:05

## 2020-02-17 RX ADMIN — MORPHINE SULFATE 2 MG: 2 INJECTION, SOLUTION INTRAMUSCULAR; INTRAVENOUS at 17:50

## 2020-02-17 RX ADMIN — ONDANSETRON 8 MG: 2 INJECTION INTRAMUSCULAR; INTRAVENOUS at 00:54

## 2020-02-17 RX ADMIN — TAMSULOSIN HYDROCHLORIDE 0.4 MG: 0.4 CAPSULE ORAL at 03:48

## 2020-02-17 RX ADMIN — SODIUM CHLORIDE 1000 ML: 9 INJECTION, SOLUTION INTRAVENOUS at 00:53

## 2020-02-17 RX ADMIN — HYDRALAZINE HYDROCHLORIDE 10 MG: 20 INJECTION INTRAMUSCULAR; INTRAVENOUS at 03:49

## 2020-02-17 RX ADMIN — KETOROLAC TROMETHAMINE 15 MG: 15 INJECTION, SOLUTION INTRAMUSCULAR; INTRAVENOUS at 03:48

## 2020-02-17 RX ADMIN — HYDROMORPHONE HYDROCHLORIDE 1 MG: 1 INJECTION, SOLUTION INTRAMUSCULAR; INTRAVENOUS; SUBCUTANEOUS at 00:54

## 2020-02-17 RX ADMIN — SODIUM CHLORIDE 500 ML: 9 INJECTION, SOLUTION INTRAVENOUS at 19:22

## 2020-02-17 ASSESSMENT — ENCOUNTER SYMPTOMS
SHORTNESS OF BREATH: 0
VOMITING: 1
CHEST TIGHTNESS: 0
ABDOMINAL PAIN: 1
ABDOMINAL DISTENTION: 1
NAUSEA: 1
ABDOMINAL PAIN: 1
RESPIRATORY NEGATIVE: 1
NAUSEA: 1
CONSTIPATION: 0
VOMITING: 0
BACK PAIN: 0
PHOTOPHOBIA: 0
DIARRHEA: 0

## 2020-02-17 ASSESSMENT — PAIN SCALES - GENERAL
PAINLEVEL_OUTOF10: 10
PAINLEVEL_OUTOF10: 10
PAINLEVEL_OUTOF10: 0
PAINLEVEL_OUTOF10: 10

## 2020-02-17 ASSESSMENT — PAIN DESCRIPTION - PAIN TYPE: TYPE: ACUTE PAIN

## 2020-02-17 NOTE — ED PROVIDER NOTES
[facility] ED  EMERGENCY DEPARTMENT ENCOUNTER      Pt Name: Carlos Mabry  MRN: [id number]  [place] 1950  Date of evaluation: 2/17/20  CHIEF COMPLAINT       Chief Complaint   Patient presents with    Abdominal Pain    Dizziness     HISTORY OF PRESENT ILLNESS   Presents emergency room via private auto with continued abdominal pain. He has left upper quadrant abdominal pain that he rates at a 10. Pain is constant without aggravating or alleviating factors. Associated symptoms include nausea, decreased appetite and feeling dizzy. He was actually evaluated for this abdominal pain last night and discharged from this emergency room at 5:00 this morning, approximately 12 hours ago. He was diagnosed with kidney stone, renal insufficiency and essential hypertension. CT from last night was reviewed and showed that the stone was already in the urinary bladder. He was discharged home with Percocet and flomax which he has been taking as directed without improvement. The history is provided by the patient. REVIEW OF SYSTEMS     Review of Systems   Constitutional: Positive for activity change and appetite change. Negative for fever. Eyes: Negative for photophobia and visual disturbance. Respiratory: Negative for chest tightness and shortness of breath. Cardiovascular: Negative for chest pain and palpitations. Gastrointestinal: Positive for abdominal pain and nausea. Negative for constipation, diarrhea and vomiting. Genitourinary: Negative for dysuria, flank pain, frequency and hematuria. Musculoskeletal: Negative for back pain and myalgias. Neurological: Positive for dizziness. Negative for weakness, numbness and headaches.      PASTMEDICAL HISTORY     Past Medical History:   Diagnosis Date    Back pain     on 12/6/19 pt states is presently in pain mgmnt    Diabetes mellitus ([address]     Kidney stone      SURGICAL HISTORY       Past Surgical History:   Procedure Laterality Date    BACK 6 hours, Disp-1 Inhaler, R-0Print       !! - Potential duplicate medications found. Please discuss with provider. ALLERGIES     is allergic to iodinated diagnostic agents and iodides. FAMILY HISTORY     has no family status information on file. SOCIAL HISTORY       Social History     Tobacco Use    Smoking status: Never Smoker    Smokeless tobacco: Never Used   Substance Use Topics    Alcohol use: No    Drug use: No     PHYSICAL EXAM     INITIAL VITALS: BP (!) 135/95   Pulse 96   Temp 99 °F (37.2 °C)   Resp 20   Ht 5' 7\" (1.702 m)   Wt 170 lb (77.1 kg)   SpO2 97%   BMI 26.63 kg/m²    Physical Exam  Constitutional:       Appearance: Normal appearance. HENT:      Right Ear: External ear normal.      Left Ear: External ear normal.      Mouth/Throat:      Mouth: Mucous membranes are moist.      Pharynx: Oropharynx is clear. Eyes:      General:         Right eye: No discharge. Left eye: No discharge. Conjunctiva/sclera: Conjunctivae normal.   Cardiovascular:      Rate and Rhythm: Normal rate and regular rhythm. Pulmonary:      Effort: Pulmonary effort is normal.      Breath sounds: Normal breath sounds. Abdominal:      General: Bowel sounds are normal.      Palpations: Abdomen is soft. Tenderness: There is no abdominal tenderness. Musculoskeletal: Normal range of motion. Right lower leg: No edema. Left lower leg: No edema. Skin:     General: Skin is warm and dry. Neurological:      General: No focal deficit present. Mental Status: He is alert and oriented to person, place, and time. DIAGNOSTIC RESULTS     RADIOLOGY:All plain film, CT, MRI, and formal ultrasound images (except ED bedside ultrasound) are read by the radiologist, see reports below, unless otherwise noted in MDM or here.     Interpretation per the Radiologist below, if available at the time of this note:    No orders to display       LABS:  Labs Reviewed   [address] [facility]

## 2020-02-17 NOTE — ED PROVIDER NOTES
[address] [place] [address] ED  eMERGENCY dEPARTMENT eNCOUnter      Pt Name: Jeffy Rai  MRN: [id number]  [place] 1950  Date of evaluation: 2/17/2020  Provider: Neno Holden MD    CHIEF COMPLAINT       Chief Complaint   Patient presents with    Abdominal Pain         HISTORY OF PRESENT ILLNESS  (Location/Symptom, Timing/Onset, Context/Setting, Quality, Duration, Modifying Factors, Severity.)   Jeffy Rai is a 77 y.o. male who presents to the emergency department for evaluation of abdominal pain. Patient is 2 weeks out status post CABG. He states he has been doing well. He still has some chest discomfort related to his sternal fractures but states overall he has no issues with regard to his recent chest pathology. Tonight he developed acute onset of severe left lower quadrant pain. He states he has had some nausea vomiting. He denies melena hematemesis or coffee-ground emesis. Pain is focal and severe. He denies history of aneurysm or dissection. No prior history of diverticulitis. He presents in significant discomfort      Nursing Notes were reviewed. ALLERGIES     Iodinated diagnostic agents and Iodides    CURRENT MEDICATIONS       Previous Medications    ALBUTEROL-IPRATROPIUM (COMBIVENT RESPIMAT)  MCG/ACT AERS INHALER    Inhale 1 puff into the lungs every 6 hours    ASPIRIN 81 MG EC TABLET    Take 1 tablet by mouth daily    CLOPIDOGREL (PLAVIX) 75 MG TABLET    Take 1 tablet by mouth daily    LUBIPROSTONE (AMITIZA) 8 MCG CAPS CAPSULE    Take 1 capsule by mouth 2 times daily (with meals)    METOPROLOL TARTRATE (LOPRESSOR) 25 MG TABLET    Take 0.5 tablets by mouth 2 times daily    OXYBUTYNIN (DITROPAN) 5 MG TABLET    Take 1 tablet by mouth 3 times daily    OXYCODONE-ACETAMINOPHEN (PERCOCET) 5-325 MG PER TABLET    Take 2 tablets by mouth every 4 hours as needed for Pain.     PANTOPRAZOLE (PROTONIX) 40 MG TABLET    Take 1 tablet by mouth every morning (before breakfast)    SODIUM BICARBONATE very uncomfortable male. He is afebrile with stable vital signs to include pulse ox of 100% on room air. He is not hypoxic. He is alert conversive appropriate behavior. He is agitated due to his discomfort. He does appear pale. Oral pharyngeal exam is without lesion. Heart regular rate and rhythm normal S1-S2 no murmurs rubs gallops. Lungs are clear to auscultation without wheezes rales rhonchi. Abdomen is exquisitely tender in the left lower quadrant with rebound and guarding noted. Diminished bowel sounds noted. No flank pain. Well-healed sternotomy incision without evidence of infection or dehiscence. He does have good distal pulses. Extremities show no cyanosis clubbing or edema. Integument without rash or lesion. He has no acute neurovascular deficits      DIAGNOSTIC RESULTS         RADIOLOGY:   Non-plain film images such as CT, Ultrasound and MRI are read by the radiologist. Plain radiographic images are visualized and preliminarily interpreted by the emergency physician with the below findings:      Interpretation per the Radiologist below, if available at the time of this note:    CT ABDOMEN PELVIS WO CONTRAST   Final Result   Tiny bladder calculus with left-sided obstructive uropathy. Small bilateral pleural effusions with bibasilar atelectasis or pneumonia.              LABS:  Labs Reviewed   BASIC METABOLIC PANEL - Abnormal; Notable for the following components:       Result Value    Glucose 175 (*)     BUN 46 (*)     CREATININE 2.48 (*)     CO2 19 (*)     GFR Non- 26 (*)     GFR  32 (*)     All other components within normal limits   CBC WITH AUTO DIFFERENTIAL - Abnormal; Notable for the following components:    RBC 4.09 (*)     Hemoglobin 11.5 (*)     Hematocrit 37.8 (*)     Seg Neutrophils 85 (*)     Lymphocytes 10 (*)     Eosinophils % 0 (*)     Segs Absolute 9.56 (*)     All other components within normal limits   URINE CULTURE   AMYLASE   HEPATIC FUNCTION PANEL   LACTIC ACID   MAGNESIUM   URINALYSIS       All other labs were within normal range or not returned as of this dictation. EMERGENCY DEPARTMENT COURSE and DIFFERENTIAL DIAGNOSIS/MDM:   Vitals:    Vitals:    02/17/20 0232 02/17/20 0247 02/17/20 0302 02/17/20 0316   BP: (!) 185/114 (!) 185/124 (!) 188/111 (!) 173/108   Pulse: 93 91 93 95   Resp: 18 18 18    Temp:       SpO2: 92% 94% 96% 94%   Weight:       Height:         Patient is evaluated. He is treated symptomatically for his discomfort. Investigations are reviewed. Patient demonstrates a recently passed stone stone is now in the bladder. Ureter continues to demonstrate some mild uropathy symptoms. Patient has a known history of previous renal insufficiency. Creatinine slightly bumped today and will require follow-up evaluation. Patient is already passed the stone. His pain is settling down. His elevated blood pressure is also treated with regard to pain management and blood pressure control. Patient also received Flomax and 1 dose of Toradol, 15 mg. Patient will be discharged home with additional medications for pain as well as his ureterolithiasis. He is advised follow-up with his family physician and urology. CONSULTS:  None    PROCEDURES:  None    FINAL IMPRESSION      1. Kidney stone    2. Renal insufficiency    3.  Essential hypertension          DISPOSITION/PLAN   DISPOSITION     Decision to Discharge    PATIENT REFERRED TO:   Jomar Rincon MD  [address] [place] [address]  [phone]    Schedule an appointment as soon as possible for a visit   Followup with regard to recheck on your blood pressure and recheck on your renal function    Rosa Finch MD  [address] [address]  [phone]    Schedule an appointment as soon as possible for a visit   Urology followup for re-evaluation of your kidney stone symptoms if pain persists    [facility] ED  [address] [facility]

## 2020-02-17 NOTE — ED NOTES
Pt presents to ed c/o left flank pain. He was seen and treated here early this morning by Dr. Roosevelt Ly and was diagnosed with a kidney stone. He came back because he was still having 10/10 pain. He is alert and oriented. Vital signs stable.       Nalini Ashton RN  02/17/20 1739

## 2020-02-18 NOTE — ED PROVIDER NOTES
[address] [place] [address] ED  EMERGENCY DEPARTMENT ENCOUNTER   ATTENDING ATTESTATION     Pt Name: Lucas Wong  MRN: [id number]  D[place]iego 1950  Date of evaluation: 2/17/20       Lucas Wong is a 77 y.o. male who presents with Abdominal Pain and Dizziness      MDM:     80-year-old male presents with complaints of persistent pain in the left abdomen. He said he had decreased appetite today. The patient has mild elevation in his creatinine but he has chronic kidney disease, at this time I do not believe he requires admission we will perform a 500 cc fluid bolus, he is tolerating oral intake here in the ER. His potassium is mildly elevated again I believe secondary to dehydration. Vitals:   Vitals:    02/17/20 1703 02/17/20 1704 02/17/20 1905   BP: 139/87  (!) 135/95   Pulse: 100  97   Resp: 14  20   Temp: 99 °F (37.2 °C)     SpO2: 97%  97%   Weight:  170 lb (77.1 kg)    Height:  5' 7\" (1.702 m)          I personally evaluated and examined the patient in conjunction with the Midlevel provider and agree with the assessment, treatment plan, and disposition of the patient as recorded by the midlevel. I performed a history and physical examination of the patient and discussed management with the midlevel. I reviewed the midlevels note and agree with the documented findings and plan of care. Any areas of disagreement are noted on the chart. I was personally present for the key portions of any procedures. I have documented in the chart those procedures where I was not present during the key portions. I have personally reviewed all images and agree with the midlevel's interpretation. I have reviewed the emergency nurses triage note. I agree with the chief complaint, past medical history, past surgical history, allergies, medications, social and family history as documented unless otherwise noted.     Lisa Angeles MD  Attending Emergency  Physician                 Price Sol MD  02/17/20 0387

## 2020-02-18 NOTE — ED NOTES
Resting in bed at this time. States,\"I feel better now. \" Denies pain at this time.       Twin Padilla RN  02/17/20 1910

## 2023-08-15 NOTE — PROGRESS NOTES
Patient remains in [facility] - Active per EMR at this time, will continue to monitor for discharge.    Neuromuscular Re-education, Patient/Caregiver Education & Training, ROM, Pain Management, Balance Training, Gait Training, Home Exercise Program, Functional Mobility Training, Stair training  Safety Devices  Type of devices: Call light within reach, Gait belt, Left in chair, Nurse notified  Restraints  Initially in place: No     Therapy Time   Individual Concurrent Group Co-treatment   Time In  1159         Time Out  1215         Minutes  16                 [id number], [place]

## (undated) DEVICE — BNDG,ELSTC,MATRIX,STRL,6"X5YD,LF,HOOK&LP: Brand: MEDLINE

## (undated) DEVICE — Device: Brand: VIRTUOSAPH PLUS WITH RADIAL INDICATION

## (undated) DEVICE — SUTURE ETHIB EXCL BR GRN TAPR PT 2-0 30 X563H X563H

## (undated) DEVICE — GAUZE,SPONGE,4"X4",16PLY,XRAY,STRL,LF: Brand: MEDLINE

## (undated) DEVICE — Z DISCONTINUED NO SUB IDED DRAIN SURG 2 COLL PT TB FOR ATS BG OASIS

## (undated) DEVICE — TRANSDUCER KT INVASIVE BLD PRSS SGL LN 1 TRNSDUC

## (undated) DEVICE — GLOVE SURG SZ 65 L12IN FNGR THK79MIL GRN LTX FREE

## (undated) DEVICE — SUTURE PROL SZ 4-0 L36IN NONABSORBABLE BLU L26MM SH 1/2 CIR 8521H

## (undated) DEVICE — KIT INTRO 9FR L4IN POLYUR PERC SHTH RADPQ W INTEGR HEMSTAS

## (undated) DEVICE — GOWN,AURORA,NON-REINFORCED,2XL: Brand: MEDLINE

## (undated) DEVICE — SUTURE VCRL SZ 3-0 L36IN ABSRB UD L36MM CT-1 1/2 CIR J944H

## (undated) DEVICE — TOWEL,OR,DSP,ST,BLUE,DLX,XR,4/PK,20PK/CS: Brand: MEDLINE

## (undated) DEVICE — SUTURE PDS II SZ 0 L36IN ABSRB VLT L36MM CT-1 1/2 CIR Z346H

## (undated) DEVICE — INSUFFLATION TUBING SET WITH FILTER, FUNNEL CONNECTOR AND LUER LOCK: Brand: JOSNOE MEDICAL INC

## (undated) DEVICE — SKIN PREP TRAY W/CHG: Brand: MEDLINE INDUSTRIES, INC.

## (undated) DEVICE — SUTURE PROL SZ 7-0 L24IN NONABSORBABLE BLU L8MM BV175-6 3/8 8735H

## (undated) DEVICE — TAPE,CLOTH/SILK,CURAD,3"X10YD,LF,40/CS: Brand: CURAD

## (undated) DEVICE — SET EXTN L41IN 2 NDL FREE VALVES FREE INJ PRT

## (undated) DEVICE — SS SUTURE, 3 PER SLEEVE: Brand: MYO/WIRE II

## (undated) DEVICE — 3M™ TEGADERM™ CHG DRESSING 25/CARTON 4 CARTONS/CASE 1659: Brand: TEGADERM™

## (undated) DEVICE — CATHETER ETER CTRL VEN OD7FR 3 LUMN COMP

## (undated) DEVICE — 3M™ RANGER™ BLOOD/FLUID WARMING STANDARD FLOW SET, 24200, 10/CASE: Brand: 3M™ RANGER™

## (undated) DEVICE — 1/4 FORCE SURGICAL SPRING CLIP: Brand: STEALTH® SPRING CLIP

## (undated) DEVICE — SKIN AFFIX SURG ADHESIVE 72/CS 0.55ML: Brand: MEDLINE

## (undated) DEVICE — PLEDGET SURG W3.5XL7MM THK1.5MM WHT PTFE RECT FIRM TFE

## (undated) DEVICE — WAX SURG 2.5GM HEMSTAT BNE BEESWAX PARAFFIN ISO PALMITATE

## (undated) DEVICE — SET ADMIN PRIMING 67ML L105IN NVENT 180UM FLTR 3 RLER CLMP

## (undated) DEVICE — SWAN-GANZ CCOMBO THERMODILUTION CATHETER: Brand: SWAN-GANZ CCOMBO

## (undated) DEVICE — SENSOR OXMTR SM AD DISP FOR INVOS SYS

## (undated) DEVICE — COVER LT HNDL BLU PLAS

## (undated) DEVICE — SYRINGE MED 30ML STD CLR PLAS LUERLOCK TIP N CTRL DISP

## (undated) DEVICE — SUTURE VCRL SZ 4-0 L27IN ABSRB UD L19MM PS-2 3/8 CIR PRIM J426H

## (undated) DEVICE — BNDG,ELSTC,MATRIX,STRL,4"X5YD,LF,HOOK&LP: Brand: MEDLINE

## (undated) DEVICE — SUTURE VCRL SZ 4-0 L27IN ABSRB UD L26MM SH 1/2 CIR J415H

## (undated) DEVICE — GLOVE SURG SZ 75 L12IN FNGR THK79MIL GRN LTX FREE

## (undated) DEVICE — PACK PROCEDURE SURG OPN HRT

## (undated) DEVICE — GOWN,SIRUS,NON REINFRCD,LARGE,SET IN SL: Brand: MEDLINE

## (undated) DEVICE — SET CATH 20GA L1.75IN RAD ART POLYUR RADPQ W/ INTEGR

## (undated) DEVICE — Device: Brand: Q2 MULTIPORT EXTENSION SET

## (undated) DEVICE — Z INACTIVE USE 2540311 LEAD PACE L475MM CHN A OR V MYOCARDIAL STEROID ELUT SIL

## (undated) DEVICE — DECANTER BAG 9": Brand: MEDLINE INDUSTRIES, INC.

## (undated) DEVICE — CATHETER,URETHRAL,REDRUBBER,STRL,18FR: Brand: MEDLINE

## (undated) DEVICE — TUBING, SUCTION, 1/4" X 12', STRAIGHT: Brand: MEDLINE

## (undated) DEVICE — SENSOR DISP ENTROPY EZ FIT

## (undated) DEVICE — MINIDRIP SET W/ CK VLV AND 2 SMRTSITE NEEDLE-FREE VLV PORTS

## (undated) DEVICE — GLOVE SURG SZ 7 CRM LTX FREE POLYISOPRENE POLYMER BEAD ANTI

## (undated) DEVICE — Z DISCONTINUED APPLICATOR SURG PREP 0.35OZ 2% CHG 70% ISO ALC W/ HI LT

## (undated) DEVICE — GLOVE SURG SZ 65 CRM LTX FREE POLYISOPRENE POLYMER BEAD ANTI

## (undated) DEVICE — SUTURE PROL SZ 6-0 L18IN NONABSORBABLE BLU RB-2 L13MM 1/2 8714H

## (undated) DEVICE — FOGARTY - HYDRAGRIP SURGICAL - CLAMP INSERTS: Brand: FOGARTY HYDRAJAW

## (undated) DEVICE — BLADE ES L6IN ELASTOMERIC COAT EXT DURABLE BEND UPTO 90DEG

## (undated) DEVICE — PRESSURE MONITORING SET: Brand: TRUWAVE

## (undated) DEVICE — DRAPE SLUSH DISC W44XL66IN ST FOR RND BSIN HUSH SLUSH SYS

## (undated) DEVICE — TTL1LYR 16FR10ML 100%SIL TMPST TR: Brand: MEDLINE

## (undated) DEVICE — SUTURE PERMA-HAND SZ 0 L18IN NONABSORBABLE BLK CT-2 L26MM C027D

## (undated) DEVICE — CANNULA PERFUSION 5.5IN 9FR AORTIC ROOT

## (undated) DEVICE — CHLORAPREP 26ML ORANGE

## (undated) DEVICE — Device: Brand: PERFECTCUT AORTOTOMY SYSTEM

## (undated) DEVICE — SOLUTION IV 500ML 0.9% SOD CHL PH 5 INJ USP VIAFLX PLAS

## (undated) DEVICE — KIT BLWR MISTER 5P 15L W/ TBNG SET IRRIG MIST TO IMPROVE

## (undated) DEVICE — SET ADMIN PRIMING 12ML L36IN 2ND INCL RLER CLMP SPIN M

## (undated) DEVICE — RADIFOCUS GLIDEWIRE: Brand: GLIDEWIRE

## (undated) DEVICE — SET ADMIN 25ML L117IN PMP MOD CK VLV RLER CLMP 2 SMRTSITE

## (undated) DEVICE — PACK PROCEDURE SURG OPN HRT ADD ON

## (undated) DEVICE — SUTURE MCRYL SZ 4-0 L27IN ABSRB UD L19MM PS-2 1/2 CIR PRIM Y426H

## (undated) DEVICE — COVER US PRB W15XL120CM W/ GEL RUBBERBAND TAPE STRP FLD GEN